# Patient Record
Sex: MALE | Race: WHITE
[De-identification: names, ages, dates, MRNs, and addresses within clinical notes are randomized per-mention and may not be internally consistent; named-entity substitution may affect disease eponyms.]

---

## 2019-07-23 ENCOUNTER — HOSPITAL ENCOUNTER (INPATIENT)
Dept: HOSPITAL 72 - EMR | Age: 64
LOS: 38 days | Discharge: NURSING, REHAB OR LONG-TERM CARE | DRG: 469 | End: 2019-08-30
Payer: MEDICAID

## 2019-07-23 VITALS — SYSTOLIC BLOOD PRESSURE: 88 MMHG | DIASTOLIC BLOOD PRESSURE: 52 MMHG

## 2019-07-23 VITALS — DIASTOLIC BLOOD PRESSURE: 55 MMHG | SYSTOLIC BLOOD PRESSURE: 100 MMHG

## 2019-07-23 VITALS — SYSTOLIC BLOOD PRESSURE: 104 MMHG | DIASTOLIC BLOOD PRESSURE: 50 MMHG

## 2019-07-23 VITALS — SYSTOLIC BLOOD PRESSURE: 99 MMHG | DIASTOLIC BLOOD PRESSURE: 54 MMHG

## 2019-07-23 VITALS — HEIGHT: 70 IN | BODY MASS INDEX: 26.08 KG/M2 | WEIGHT: 182.19 LBS

## 2019-07-23 VITALS — DIASTOLIC BLOOD PRESSURE: 52 MMHG | SYSTOLIC BLOOD PRESSURE: 93 MMHG

## 2019-07-23 VITALS — DIASTOLIC BLOOD PRESSURE: 57 MMHG | SYSTOLIC BLOOD PRESSURE: 102 MMHG

## 2019-07-23 DIAGNOSIS — N40.1: ICD-10-CM

## 2019-07-23 DIAGNOSIS — F10.20: ICD-10-CM

## 2019-07-23 DIAGNOSIS — N17.9: Primary | ICD-10-CM

## 2019-07-23 DIAGNOSIS — H54.62: ICD-10-CM

## 2019-07-23 DIAGNOSIS — R00.1: ICD-10-CM

## 2019-07-23 DIAGNOSIS — R57.1: ICD-10-CM

## 2019-07-23 DIAGNOSIS — A04.72: ICD-10-CM

## 2019-07-23 DIAGNOSIS — N18.9: ICD-10-CM

## 2019-07-23 DIAGNOSIS — N20.0: ICD-10-CM

## 2019-07-23 DIAGNOSIS — E44.0: ICD-10-CM

## 2019-07-23 DIAGNOSIS — Z22.322: ICD-10-CM

## 2019-07-23 DIAGNOSIS — D64.9: ICD-10-CM

## 2019-07-23 DIAGNOSIS — N13.6: ICD-10-CM

## 2019-07-23 DIAGNOSIS — F25.9: ICD-10-CM

## 2019-07-23 DIAGNOSIS — N32.3: ICD-10-CM

## 2019-07-23 DIAGNOSIS — R33.8: ICD-10-CM

## 2019-07-23 DIAGNOSIS — N21.0: ICD-10-CM

## 2019-07-23 DIAGNOSIS — F39: ICD-10-CM

## 2019-07-23 DIAGNOSIS — N31.9: ICD-10-CM

## 2019-07-23 DIAGNOSIS — E83.42: ICD-10-CM

## 2019-07-23 DIAGNOSIS — K70.30: ICD-10-CM

## 2019-07-23 DIAGNOSIS — E78.5: ICD-10-CM

## 2019-07-23 DIAGNOSIS — I13.10: ICD-10-CM

## 2019-07-23 DIAGNOSIS — G93.41: ICD-10-CM

## 2019-07-23 LAB
ADD MANUAL DIFF: NO
ALBUMIN SERPL-MCNC: 4 G/DL (ref 3.4–5)
ALBUMIN/GLOB SERPL: 1.1 {RATIO} (ref 1–2.7)
ALP SERPL-CCNC: 89 U/L (ref 46–116)
ALT SERPL-CCNC: 80 U/L (ref 12–78)
ANION GAP SERPL CALC-SCNC: 13 MMOL/L (ref 5–15)
APPEARANCE UR: (no result)
APTT BLD: 28 SEC (ref 23–33)
APTT PPP: YELLOW S
AST SERPL-CCNC: 60 U/L (ref 15–37)
BASOPHILS NFR BLD AUTO: 0.8 % (ref 0–2)
BILIRUB SERPL-MCNC: 0.4 MG/DL (ref 0.2–1)
BUN SERPL-MCNC: 53 MG/DL (ref 7–18)
CALCIUM SERPL-MCNC: 9.2 MG/DL (ref 8.5–10.1)
CHLORIDE SERPL-SCNC: 102 MMOL/L (ref 98–107)
CO2 SERPL-SCNC: 22 MMOL/L (ref 21–32)
CREAT SERPL-MCNC: 4.5 MG/DL (ref 0.55–1.3)
EOSINOPHIL NFR BLD AUTO: 3.8 % (ref 0–3)
ERYTHROCYTE [DISTWIDTH] IN BLOOD BY AUTOMATED COUNT: 14.1 % (ref 11.6–14.8)
GLOBULIN SER-MCNC: 3.8 G/DL
GLUCOSE UR STRIP-MCNC: NEGATIVE MG/DL
HCT VFR BLD CALC: 37.5 % (ref 42–52)
HGB BLD-MCNC: 12.3 G/DL (ref 14.2–18)
INR PPP: 1 (ref 0.9–1.1)
KETONES UR QL STRIP: NEGATIVE
LEUKOCYTE ESTERASE UR QL STRIP: (no result)
LYMPHOCYTES NFR BLD AUTO: 16 % (ref 20–45)
MCV RBC AUTO: 90 FL (ref 80–99)
MONOCYTES NFR BLD AUTO: 7.3 % (ref 1–10)
NEUTROPHILS NFR BLD AUTO: 72 % (ref 45–75)
NITRITE UR QL STRIP: NEGATIVE
PH UR STRIP: 5 [PH] (ref 4.5–8)
PLATELET # BLD: 165 K/UL (ref 150–450)
POTASSIUM SERPL-SCNC: 4.6 MMOL/L (ref 3.5–5.1)
PROT UR QL STRIP: (no result)
RBC # BLD AUTO: 4.16 M/UL (ref 4.7–6.1)
SODIUM SERPL-SCNC: 137 MMOL/L (ref 136–145)
SP GR UR STRIP: 1.02 (ref 1–1.03)
UROBILINOGEN UR-MCNC: NORMAL MG/DL (ref 0–1)
WBC # BLD AUTO: 9 K/UL (ref 4.8–10.8)

## 2019-07-23 PROCEDURE — 81001 URINALYSIS AUTO W/SCOPE: CPT

## 2019-07-23 PROCEDURE — 80048 BASIC METABOLIC PNL TOTAL CA: CPT

## 2019-07-23 PROCEDURE — 85610 PROTHROMBIN TIME: CPT

## 2019-07-23 PROCEDURE — 81003 URINALYSIS AUTO W/O SCOPE: CPT

## 2019-07-23 PROCEDURE — 85730 THROMBOPLASTIN TIME PARTIAL: CPT

## 2019-07-23 PROCEDURE — 96361 HYDRATE IV INFUSION ADD-ON: CPT

## 2019-07-23 PROCEDURE — 99285 EMERGENCY DEPT VISIT HI MDM: CPT

## 2019-07-23 PROCEDURE — 76770 US EXAM ABDO BACK WALL COMP: CPT

## 2019-07-23 PROCEDURE — 74176 CT ABD & PELVIS W/O CONTRAST: CPT

## 2019-07-23 PROCEDURE — 71045 X-RAY EXAM CHEST 1 VIEW: CPT

## 2019-07-23 PROCEDURE — 83880 ASSAY OF NATRIURETIC PEPTIDE: CPT

## 2019-07-23 PROCEDURE — 87324 CLOSTRIDIUM AG IA: CPT

## 2019-07-23 PROCEDURE — 87086 URINE CULTURE/COLONY COUNT: CPT

## 2019-07-23 PROCEDURE — 82550 ASSAY OF CK (CPK): CPT

## 2019-07-23 PROCEDURE — 84550 ASSAY OF BLOOD/URIC ACID: CPT

## 2019-07-23 PROCEDURE — 96367 TX/PROPH/DG ADDL SEQ IV INF: CPT

## 2019-07-23 PROCEDURE — 93005 ELECTROCARDIOGRAM TRACING: CPT

## 2019-07-23 PROCEDURE — 36415 COLL VENOUS BLD VENIPUNCTURE: CPT

## 2019-07-23 PROCEDURE — 96365 THER/PROPH/DIAG IV INF INIT: CPT

## 2019-07-23 PROCEDURE — 86900 BLOOD TYPING SEROLOGIC ABO: CPT

## 2019-07-23 PROCEDURE — 80061 LIPID PANEL: CPT

## 2019-07-23 PROCEDURE — 80053 COMPREHEN METABOLIC PANEL: CPT

## 2019-07-23 PROCEDURE — 86901 BLOOD TYPING SEROLOGIC RH(D): CPT

## 2019-07-23 PROCEDURE — 83735 ASSAY OF MAGNESIUM: CPT

## 2019-07-23 PROCEDURE — 84443 ASSAY THYROID STIM HORMONE: CPT

## 2019-07-23 PROCEDURE — 80329 ANALGESICS NON-OPIOID 1 OR 2: CPT

## 2019-07-23 PROCEDURE — 84484 ASSAY OF TROPONIN QUANT: CPT

## 2019-07-23 PROCEDURE — 86850 RBC ANTIBODY SCREEN: CPT

## 2019-07-23 PROCEDURE — 80307 DRUG TEST PRSMV CHEM ANLYZR: CPT

## 2019-07-23 PROCEDURE — 93306 TTE W/DOPPLER COMPLETE: CPT

## 2019-07-23 PROCEDURE — 85025 COMPLETE CBC W/AUTO DIFF WBC: CPT

## 2019-07-23 PROCEDURE — 87081 CULTURE SCREEN ONLY: CPT

## 2019-07-23 PROCEDURE — 85007 BL SMEAR W/DIFF WBC COUNT: CPT

## 2019-07-23 PROCEDURE — 84100 ASSAY OF PHOSPHORUS: CPT

## 2019-07-23 RX ADMIN — TRAMADOL HYDROCHLORIDE PRN MG: 50 TABLET, FILM COATED ORAL at 18:37

## 2019-07-23 RX ADMIN — TAMSULOSIN HYDROCHLORIDE SCH MG: 0.4 CAPSULE ORAL at 20:15

## 2019-07-23 RX ADMIN — LORAZEPAM PRN MG: 1 TABLET ORAL at 23:31

## 2019-07-23 NOTE — EMERGENCY ROOM REPORT
History of Present Illness


General


Chief Complaint:  General Complaint


Source:  Patient





Present Illness


HPI


Patient is a 64 year old male brought in from board and care for increased 

weakness and difficulty taking care of himself.  Patient states he can't 

remember why he's at hospital. He denies any current pain.  He was noted to be 

on multiple medications but doesn't know the names.


Allergies:  


Coded Allergies:  


     No Known Allergies (Unverified , 7/23/19)





Patient History


Past Medical History:  see triage record


Reviewed Nursing Documentation:  PMH: Agreed; PSxH: Agreed





Nursing Documentation-PMH


Past Medical History:  No History, Except For


Hx Cardiac Problems:  Yes


Hx Hypertension:  Yes


Hx Cancer:  No


Hx Gastrointestinal Problems:  No


Hx Neurological Problems:  No





Review of Systems


All Other Systems:  limited - by poor historian





Physical Exam





Vital Signs








  Date Time  Temp Pulse Resp B/P (MAP) Pulse Ox O2 Delivery O2 Flow Rate FiO2


 


7/23/19 11:23 97.5 56 18 87/54 (65) 92 Room Air  








General Appearance:  alert, Chronically Ill


Eyes:  bilateral eye other - left eye scarred cornea


ENT:  hearing grossly normal, normal voice


Neck:  full range of motion


Respiratory:  normal inspection, lungs clear, normal breath sounds


Cardiovascular #1:  normal inspection, regular rate, rhythm


Gastrointestinal:  normal inspection, non tender


Musculoskeletal:  decreased range of motion


Neurologic:  alert, responsive, other - oriented to name


Psychiatric:  depressed affect


Skin:  no rash





Medical Decision Making


Diagnostic Impression:  


 Primary Impression:  


 Generalized weakness


 Additional Impressions:  


 Renal insufficiency


 Bilateral renal stones


 Bladder calculi


 Urinary tract infection


ER Course


Patient present for increased generalized weakness.  Differential diagnosis 

include was not limited to electrolyte abnormality, sepsis, urinary tract 

infection, uremia among others.  Because of complexity of patient's case 

laboratory testing and imaging studies were ordered.  Patient was noted to have 

some increased generalized weakness as well as bizarre behavior.  He had some 

prior history of substance abuse.  CT imaging of the abdomen pelvis showed  

large renal stones bilaterally as well as large bladder stone.  Patient was 

noted to have some evidence of urinary infection as well as renal 

insufficiency.  It is unclear if this is acute or chronic.  Patient was 

discussed with Dr. Peter Bolaños for inpatient management due to panel 

physician.  Patient will be admitted for further evaluation and treatment.





Labs








Test


  7/23/19


12:20 7/23/19


14:16


 


White Blood Count


  9.0 K/UL


(4.8-10.8) 


 


 


Red Blood Count


  4.16 M/UL


(4.70-6.10) 


 


 


Hemoglobin


  12.3 G/DL


(14.2-18.0) 


 


 


Hematocrit


  37.5 %


(42.0-52.0) 


 


 


Mean Corpuscular Volume 90 FL (80-99)  


 


Mean Corpuscular Hemoglobin


  29.5 PG


(27.0-31.0) 


 


 


Mean Corpuscular Hemoglobin


Concent 32.7 G/DL


(32.0-36.0) 


 


 


Red Cell Distribution Width


  14.1 %


(11.6-14.8) 


 


 


Platelet Count


  165 K/UL


(150-450) 


 


 


Mean Platelet Volume


  6.7 FL


(6.5-10.1) 


 


 


Neutrophils (%) (Auto)


  72.0 %


(45.0-75.0) 


 


 


Lymphocytes (%) (Auto)


  16.0 %


(20.0-45.0) 


 


 


Monocytes (%) (Auto)


  7.3 %


(1.0-10.0) 


 


 


Eosinophils (%) (Auto)


  3.8 %


(0.0-3.0) 


 


 


Basophils (%) (Auto)


  0.8 %


(0.0-2.0) 


 


 


Prothrombin Time


  10.7 SEC


(9.30-11.50) 


 


 


Prothromb Time International


Ratio 1.0 (0.9-1.1) 


  


 


 


Activated Partial


Thromboplast Time 28 SEC (23-33) 


  


 


 


Sodium Level


  137 MMOL/L


(136-145) 


 


 


Potassium Level


  4.6 MMOL/L


(3.5-5.1) 


 


 


Chloride Level


  102 MMOL/L


() 


 


 


Carbon Dioxide Level


  22 MMOL/L


(21-32) 


 


 


Anion Gap


  13 mmol/L


(5-15) 


 


 


Blood Urea Nitrogen


  53 mg/dL


(7-18) 


 


 


Creatinine


  4.5 MG/DL


(0.55-1.30) 


 


 


Estimat Glomerular Filtration


Rate 13.2 mL/min


(>60) 


 


 


Glucose Level


  120 MG/DL


() 


 


 


Calcium Level


  9.2 MG/DL


(8.5-10.1) 


 


 


Total Bilirubin


  0.4 MG/DL


(0.2-1.0) 


 


 


Aspartate Amino Transf


(AST/SGOT) 60 U/L (15-37) 


  


 


 


Alanine Aminotransferase


(ALT/SGPT) 80 U/L (12-78) 


  


 


 


Alkaline Phosphatase


  89 U/L


() 


 


 


Troponin I


  0.000 ng/mL


(0.000-0.056) 


 


 


Pro-B-Type Natriuretic Peptide


  142 pg/mL


(0-125) 


 


 


Total Protein


  7.8 G/DL


(6.4-8.2) 


 


 


Albumin


  4.0 G/DL


(3.4-5.0) 


 


 


Globulin 3.8 g/dL  


 


Albumin/Globulin Ratio 1.1 (1.0-2.7)  


 


Thyroid Stimulating Hormone


(TSH) 3.037 uiU/mL


(0.358-3.740) 


 


 


Urine Color  Yellow 


 


Urine Appearance  Cloudy 


 


Urine pH  5 (4.5-8.0) 


 


Urine Specific Gravity


  


  1.020


(1.005-1.035)


 


Urine Protein  2+ (NEGATIVE) 


 


Urine Glucose (UA)


  


  Negative


(NEGATIVE)


 


Urine Ketones


  


  Negative


(NEGATIVE)


 


Urine Blood  5+ (NEGATIVE) 


 


Urine Nitrite


  


  Negative


(NEGATIVE)


 


Urine Bilirubin  1+ (NEGATIVE) 


 


Urine Ictotest


  


  Negative


(NEGATIVE)


 


Urine Urobilinogen


  


  Normal MG/DL


(0.0-1.0)


 


Urine Leukocyte Esterase  3+ (NEGATIVE) 


 


Urine RBC


  


  20-30 /HPF (0


- 0)


 


Urine WBC


  


  15-20 /HPF (0


- 0)


 


Urine Squamous Epithelial


Cells 


  Many /LPF


(NONE/OCC)


 


Urine Bacteria


  


  Few /HPF


(NONE)


 


Urine Opiates Screen


  


  Negative


(NEGATIVE)


 


Urine Barbiturates Screen


  


  Negative


(NEGATIVE)


 


Phencyclidine (PCP) Screen


  


  Negative


(NEGATIVE)


 


Urine Amphetamines Screen


  


  Negative


(NEGATIVE)


 


Urine Benzodiazepines Screen


  


  Negative


(NEGATIVE)


 


Urine Cocaine Screen


  


  Negative


(NEGATIVE)


 


Urine Marijuana (THC) Screen


  


  Negative


(NEGATIVE)








EKG Diagnostic Results


Rate:  bradycardiac


Rhythm:  NSR


ST Segments:  no acute changes





Last Vital Signs








  Date Time  Temp Pulse Resp B/P (MAP) Pulse Ox O2 Delivery O2 Flow Rate FiO2


 


7/23/19 17:11      Room Air  


 


7/23/19 15:33  60      


 


7/23/19 15:30 98.7  18 99/54 (69) 98   








Status:  improved


Disposition:  ADMITTED AS INPATIENT


Condition:  Serious


Referrals:  


NON PHYSICIAN (PCP)











Lalo Ventura MD Jul 23, 2019 21:46

## 2019-07-23 NOTE — CONSULTATION
DATE OF CONSULTATION:  07/23/2019

NEPHROLOGY CONSULTATION



CONSULTING PHYSICIAN:  Manny Eldridge M.D.



REFERRING PHYSICIAN:  Peter Bolaños M.D.



CHIEF COMPLAINT AND REASON FOR HOSPITALIZATION:  The patient is admitted

with elevated BUN and creatinine.



HISTORY OF PRESENT ILLNESS:  The patient is a 64-year-old man, who

apparently has had kidney stones before presents with elevated BUN and

creatinine.  CT showing 6.3 x 2 x 2.6 centimeter staghorn calculus filling

the inferior left renal pelvis, mild hydronephrosis, and a 9 x 9

millimeter calculus within the right renal pelvis with minimal right

hydronephrosis and a large bladder stone.  The patient has had some

history of kidney stones.  He has a very poor historian.  He is agitated

and swearing.  There is a history of prior alcohol and drug usage,

hypertension, hyperlipidemia.



ALLERGIES:  None known.



SURGERIES:  Hip fracture.



MEDICATIONS:  At the facility include doxazosin, tamsulosin, aspirin,

amlodipine, clonidine, lisinopril, atenolol, atorvastatin, vitamin B1, and

folic acid.



HABITS:  He is a cigarette smoker most of his adult life.  He has used

drugs and alcohol in the past.



SOCIAL HISTORY:  Lives in a residential facility.



SYSTEM REVIEW:

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

from trauma.  Hearing is good.

ENDOCRINE:  He is not aware of diabetes or thyroid disease.

PULMONARY:  He has been a smoker.  No current shortness of breath.  No

wheezing.

CARDIAC:  No angina or MI.

GASTROINTESTINAL:  No known obvious GI bleeding or ulcers.

GENITOURINARY:  He has had some difficulty voiding and suprapubic pain.

MUSCULOSKELETAL:  He has difficulty walking.  He has had prior trauma.

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

professional .



PHYSICAL EXAMINATION:

GENERAL:  The patient is alert, well-developed man, swearing and somewhat

agitated.

VITAL SIGNS:  Temperature 98.7, pulse 58, respirations 18, blood pressure

99/54, pulse ox 98.

HEAD, EYES, EARS, NOSE, AND THROAT:  There is a right lens opacity.  Ocular

motions intact in all directions.  Oral mucosa moist.

NECK:  No adenopathy.

LUNGS:  Clear.

HEART:  Regular rhythm.  No murmur.

ABDOMEN:  Soft.  No organomegaly.  There is some suprapubic tenderness.

EXTREMITIES:  No edema, cyanosis, or clubbing.

NEUROLOGIC:  He is alert and responsive.  Agitated.  Ocular motions intact

in all directions.  Smile symmetric.  Tongue is midline.  He moves all

extremities.  Gait is not tested.



LABORATORY DATA:  Pertinent labs show white count 9000, hemoglobin is 12.3.

Sodium 137, potassium 4.6, chloride 102, CO2 f 22, BUN 53, creatinine is

4.5, estimated GFR 13.2, calcium 9.2.  AST 60, ALT 80.  .  Tox

screen is negative.  Urinalysis shows 20 to 30 red cells and 15 to 20

white cells per high-power field.



IMPRESSION:

1. Kidney failure, acute versus chronic, likely due to staghorn calculi

and obstructive uropathy.  Rule out acute kidney injury versus chronic.

2. Bladder stone.

3. Mild hydronephrosis.

4. Agitation, etiology could be alcohol withdrawal or other.

5. History of hypertension.  Blood pressure low now.

6. History of hyperlipidemia.

7. History of taking medication for BPH.



PLAN:  The patient will be hydrated.  We will try to get him to cooperate

to Yang catheter.  He should have urologic evaluation.  We will try to

get old records.  Start him on empiric antibiotics pending culture

results.









  ______________________________________________

  Manny Eldridge M.D.





DR:  CLARITA

D:  07/23/2019 18:56

T:  07/23/2019 22:56

JOB#:  [id number]

CC:

## 2019-07-23 NOTE — DIAGNOSTIC IMAGING REPORT
Indication: Abdominal pain and lower back pain

 

Technique: Spiral acquisitions obtained through the abdomen and pelvis. No oral

contrast utilized, per emergency room physician request No IV contrast utilized, due

to renal insufficiency. Multiplanar reconstructions were generated. Total dose length

product 921.34 mGycm. CTDIvol(s) 17.49 mGy. Dose reduction achieved using automated

exposure control

 

 

Comparison: None

 

Findings: There is a very large staghorn calculus filling the inferior aspect of the

a large left extrarenal pelvis, with one branch extending into the left lower pole

infundibulum.. This is Y-shaped, measures approximately 6.3 x 2 x 2.6 cm. The renal

pelvis and collecting system are hydronephrotic, with mild calyceal hydronephrosis

present. No ureteral calculus demonstrated. There is mild ectasia of the distal left

ureter. 9 x 9 mm calculus within the right renal pelvis. There is only minimal if any

right hydronephrosis, although there is a slight degree of pelviectasis. The lack of

IV contrast limits assessment of the renal parenchyma. No gross renal parenchymal

mass or cyst demonstrated.

 

There is a very large bladder stone present. This measures 5.4 cm long axis dimension

by 3.8 x 4.2 cm orthogonal short axis dimensions. This is located dependently within

the bladder. There is mild posterior bladder wall thickening. There is a diverticulum

at the dome.

 

Lack of IV contrast limits assessment of the other solid organs. The liver

demonstrates surface nodularity, consistent with cirrhosis. It also demonstrates

relative atrophy of the right lobe and hypertrophy of the left lobe. No focal

abnormality demonstrated.

 

The gallbladder, bile ducts, pancreas are unremarkable. The spleen demonstrates

capsular calcifications. The adrenals are unremarkable. No pelvic mass or adenopathy.

 

The lack of enteric contrast limits assessment of the GI tract. The appendix is

normal. Fluid is seen within the colon. There is no evidence of diverticulosis or

diverticulitis. There is evidence of prior mesh hernia repair of the lower abdominal

wall, with anchor sutures in place. There is a tiny left inguinal hernia which

contains only fat. No small bowel distention. No free or loculated intraperitoneal

gas or fluid is evident. The distal esophagus, stomach, duodenum are unremarkable.

 

Included lung bases demonstrate posterior dependent atelectatic changes. The bones

demonstrate hardware in the left hip reducing an intertrochanteric fracture. The

fracture lines persist so the fracture may be subacute.

 

Impression: 6.3 x 2 x 2.6 cm staghorn calculus filling the inferior left renal

pelvis, which is a large extrarenal pelvis. There is mild hydronephrosis as a result

 

9 x 9 mm calculus within the right renal pelvis. Minimal if any right hydronephrosis

noted

 

Very large 5.4 x 3.8 x 4.2 cm bladder stone

 

Wall thickening in the posterior bladder. This could indicate acute or chronic

inflammation or indicate changes due to chronic bladder outlet obstruction

 

Superior dome bladder diverticulum

 

Hepatic surface nodularity, relative right lobe atrophy and left lobe hypertrophy,

consistent with cirrhosis

 

Nonspecific colonic fluid, could indicate diarrheal illness. Correlate with clinical

findings

 

Evidence of prior lower abdominal wall hernia mesh repair

 

Hardware reducing left hip intertrochanteric fracture. Fracture lines persist, so

suspect fracture and surgery or recent

 

Other findings as noted, including dependent posterior pulmonary atelectatic changes,

tiny fat-containing left inguinal hernia

 

Findings previously discussed by phone with Dr. Ventura in the emergency room

 

The CT scanner at [facility] is accredited by the [employer] College of

Radiology and the scans are performed using protocols designed to limit radiation

exposure to as low as reasonably achievable to attain images of sufficient resolution

adequate for diagnostic evaluation.

## 2019-07-24 VITALS — SYSTOLIC BLOOD PRESSURE: 108 MMHG | DIASTOLIC BLOOD PRESSURE: 62 MMHG

## 2019-07-24 VITALS — SYSTOLIC BLOOD PRESSURE: 99 MMHG | DIASTOLIC BLOOD PRESSURE: 52 MMHG

## 2019-07-24 VITALS — SYSTOLIC BLOOD PRESSURE: 107 MMHG | DIASTOLIC BLOOD PRESSURE: 62 MMHG

## 2019-07-24 VITALS — SYSTOLIC BLOOD PRESSURE: 101 MMHG | DIASTOLIC BLOOD PRESSURE: 60 MMHG

## 2019-07-24 VITALS — DIASTOLIC BLOOD PRESSURE: 64 MMHG | SYSTOLIC BLOOD PRESSURE: 105 MMHG

## 2019-07-24 VITALS — SYSTOLIC BLOOD PRESSURE: 106 MMHG | DIASTOLIC BLOOD PRESSURE: 61 MMHG

## 2019-07-24 LAB
ALBUMIN SERPL-MCNC: 3.2 G/DL (ref 3.4–5)
ALBUMIN/GLOB SERPL: 1 {RATIO} (ref 1–2.7)
ALP SERPL-CCNC: 81 U/L (ref 46–116)
ALT SERPL-CCNC: 66 U/L (ref 12–78)
ANION GAP SERPL CALC-SCNC: 11 MMOL/L (ref 5–15)
AST SERPL-CCNC: 51 U/L (ref 15–37)
BILIRUB SERPL-MCNC: 0.4 MG/DL (ref 0.2–1)
BUN SERPL-MCNC: 41 MG/DL (ref 7–18)
CALCIUM SERPL-MCNC: 8.1 MG/DL (ref 8.5–10.1)
CHLORIDE SERPL-SCNC: 108 MMOL/L (ref 98–107)
CK SERPL-CCNC: 60 U/L (ref 26–308)
CO2 SERPL-SCNC: 20 MMOL/L (ref 21–32)
CREAT SERPL-MCNC: 2.8 MG/DL (ref 0.55–1.3)
GLOBULIN SER-MCNC: 3.1 G/DL
PHOSPHATE SERPL-MCNC: 3.7 MG/DL (ref 2.5–4.9)
POTASSIUM SERPL-SCNC: 4.4 MMOL/L (ref 3.5–5.1)
SODIUM SERPL-SCNC: 139 MMOL/L (ref 136–145)

## 2019-07-24 RX ADMIN — ASPIRIN SCH MG: 81 TABLET, DELAYED RELEASE ORAL at 09:35

## 2019-07-24 RX ADMIN — SODIUM CHLORIDE SCH MLS/HR: 0.9 INJECTION INTRAVENOUS at 09:35

## 2019-07-24 RX ADMIN — TAMSULOSIN HYDROCHLORIDE SCH MG: 0.4 CAPSULE ORAL at 18:29

## 2019-07-24 RX ADMIN — TRAMADOL HYDROCHLORIDE PRN MG: 50 TABLET, FILM COATED ORAL at 12:10

## 2019-07-24 RX ADMIN — TAMSULOSIN HYDROCHLORIDE SCH MG: 0.4 CAPSULE ORAL at 09:35

## 2019-07-24 NOTE — PROGRESS NOTE
DATE:  07/24/2019

INTERNAL MEDICINE PROGRESS NOTE



SUBJECTIVE:  The patient is intermittently noncompliant with medications.

He has a Yang catheter in place draining clear urine.  Blood pressure

parameters remain low off antihypertensive, but slightly improved from

yesterday's admission vitals.



OBJECTIVE:

VITAL SIGNS:  Blood pressure 108/62, heart rate 53, respiratory rate 23.

LUNGS:  Clear.

CARDIAC:  Regular.  Normal S1, S2.  Monitored rhythm, sinus bradycardia.

ABDOMEN:  Soft.  No CVA tenderness.

EXTREMITIES:  No edema.



LABORATORY DATA:  Sodium 139, potassium 4.4, bicarbonate 20, BUN 41,

creatinine 2.8.  Uric acid 7.6.  Albumin 3.2.



IMPRESSION:

1. Acute renal failure, improved with hydration.

2. Obstructive uropathy with staghorn vesicular calculus and right renal

calculus.

3. Hyperuricemia.

4. Metabolic encephalopathy.

5. Probable urinary tract infection.

6. Sinus bradycardia.

7. Hypovolemic shock.



PLAN:

1. Continue to hold antihypertensives.

2. Continue cardiac monitoring.

3. Continue empiric antimicrobials.

4. Continue intravenous fluid volume resuscitation.

5. Maintain tamsulosin.

6. Strain urine with Yang catheter in place.

7. Urology followup.









  ______________________________________________

  BRIGIDA Godfrey

D:  07/24/2019 22:58

T:  07/24/2019 23:09

JOB#:  [id number]

CC:

## 2019-07-24 NOTE — CONSULTATION
DATE OF CONSULTATION:  07/23/2019

CONSULTING PHYSICIAN:  Israel Pink M.D.



REFERRING PHYSICIAN:  Peter Bolaños M.D.



REASON FOR CONSULTATION:  Multiple urologic issues.



HISTORY OF PRESENT ILLNESS:  This is a 64-year-old male who is an ER panel

patient.  He came to the emergency room because of vague discomfort.  He

was noted to have multiple stones in the kidney and bladder and renal

insufficiency all of which appeared to be chronic.  He is a very poor

historian.



PAST MEDICAL HISTORY:  As above.



MEDICATIONS:  Current medication list reviewed.



ALLERGIES:  No allergies.



PHYSICAL EXAMINATION:

GENERAL:  The patient is in no acute distress, no pain.

VITAL SIGNS:  Temperature is 98.7, blood pressure 99/54.  Yang catheter

was placed by the nursing staff.  Urine is grossly yellow.  There is no

CVA tenderness.



LABORATORY DATA:  UA shows 20 to 30 rbc's, 15 to 20 wbc's.  Creatinine is

4.5, baseline is unknown.



DIAGNOSTIC IMAGING STUDIES:  The patient had a CT scan of the abdomen and

pelvis, this showed evidence of a large staghorn calculus of left renal

pelvis, 6 cm large extrarenal pelvis.  There was mild hydronephrosis

reported.  There was 9 mm stone in the right renal pelvis, no

hydronephrosis, 5 cm bladder stone, thickening of the posterior bladder

wall and bladder diverticulum.  There was evidence of bladder cirrhosis.



IMPRESSION:

1. Staghorn renal calculus.

2. Bladder calculus.

3. Mild hydronephrosis.

4. Chronic kidney disease.

5. Possible acute kidney injury.

6. Hematuria.

7. Pyuria.

8. Urinary retention.

9. Neurogenic bladder.

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



PLAN AND DISCUSSION:  The patient again does have a large staghorn calculus

and large bladder calculus.  These are both chronic findings as well as

hydronephrosis which is mild.  He has chronic renal insufficiency.  His

kidney function will be monitored.  Yang catheter is currently

indwelling.  Flomax has been added as well as antibiotics and he will be

monitored clinically and he can have treatment of his stones electively as

an outpatient with his urologist as planned.  I did also speak with Dr. Ibarra who is the panel urologist for the ER to be aware of the patient

also and we will go from there.



Thank you for this consultation.









  ______________________________________________

  Israel Pink M.D.





DR:  Nick

D:  07/23/2019 21:59

T:  07/24/2019 00:15

JOB#:  [id number]

CC:

## 2019-07-24 NOTE — NEPHROLOGY PROGRESS NOTE
Assessment/Plan


Problem List:  


(1) Bladder calculi


(2) Renal insufficiency


(3) Bilateral renal stones


(4) KENYETTA (acute kidney injury)


(5) Hydronephrosis


(6) Staghorn calculus


Plan


creatinine lower, still agitated and pulled iv, consider psych consult, will 

need further  procedures in future





Subjective


Constitutional:  Reports: other


HEENT:  Reports: no symptoms


Genitourinary:  Reports: pain


Neurologic/Psychiatric:  Reports: no symptoms


Subjective


pain with pierre , no distress-





Objective


Objective





Last 24 Hour Vital Signs








  Date Time  Temp Pulse Resp B/P (MAP) Pulse Ox O2 Delivery O2 Flow Rate FiO2


 


7/24/19 16:00  55      


 


7/24/19 16:00 97.8 53 23 108/62 (77) 98   


 


7/24/19 12:00  54      


 


7/24/19 12:00 98.0 60 23 105/64 (78) 93   


 


7/24/19 09:00      Room Air  


 


7/24/19 08:00  61      


 


7/24/19 08:00 99.0 59 23 99/52 (68) 94   


 


7/24/19 04:00 98.3 58 19 101/60 (74) 92   


 


7/24/19 04:00  61      


 


7/24/19 00:00 97.3 62 18 107/62 (77)    


 


7/24/19 00:00  56      


 


7/23/19 21:00      Room Air  


 


7/23/19 20:00 97.1 59 19 102/57 (72) 95   


 


7/23/19 20:00  64      

















Intake and Output  


 


 7/23/19 7/24/19





 19:00 07:00


 


Intake Total 320 ml 


 


Output Total  700 ml


 


Balance 320 ml -700 ml


 


  


 


Intake Oral 320 ml 


 


Output Urine Total  700 ml


 


# Voids 1 








Laboratory Tests


7/23/19 21:20: Serum Alcohol < 3


7/24/19 06:00: 


Sodium Level 139, Potassium Level 4.4, Chloride Level 108H, Carbon Dioxide 

Level 20L, Anion Gap 11, Blood Urea Nitrogen 41H, Creatinine 2.8H, Estimat 

Glomerular Filtration Rate 22.9, Glucose Level 102, Uric Acid 7.6H, Calcium 

Level 8.1L, Phosphorus Level 3.7, Magnesium Level 1.9, Total Bilirubin 0.4, 

Aspartate Amino Transf (AST/SGOT) 51H, Alanine Aminotransferase (ALT/SGPT) 66, 

Alkaline Phosphatase 81, Total Creatine Kinase 60, Total Protein 6.3L, Albumin 

3.2L, Globulin 3.1, Albumin/Globulin Ratio 1.0


Height (Feet):  5


Height (Inches):  10.00


Weight (Pounds):  181


General Appearance:  no apparent distress, alert, agitated


EENT:  normal ENT inspection


Neck:  normal alignment


Cardiovascular:  normal rate, regular rhythm


Respiratory/Chest:  lungs clear, normal breath sounds


Abdomen:  non tender, soft


Extremities:  other - no edema


Neurologic:  CNs II-XII grossly normal











Manny Eldridge MD Jul 24, 2019 18:03

## 2019-07-24 NOTE — UROLOGY PROGRESS NOTE
Assessment/Plan


Assessment/Plan:


1. Staghorn renal calculus.


2. Bladder calculus.


3. Mild hydronephrosis, likely chronic.


4. Chronic kidney disease.


5. Possible acute kidney injury.


6. Hematuria.


7. Pyuria.


8. Urinary retention.


9. Neurogenic bladder.


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





monitor clinically


pierre


monitor renal fxn


abx as ordered


f/u on cx's


flomax





Subjective


Allergies:  


Coded Allergies:  


     No Known Allergies (Unverified , 7/23/19)


Subjective


all noted, no pain





Objective





Last 24 Hour Vital Signs








  Date Time  Temp Pulse Resp B/P (MAP) Pulse Ox O2 Delivery O2 Flow Rate FiO2


 


7/24/19 09:00      Room Air  


 


7/24/19 08:00  61      


 


7/24/19 08:00 99.0 59 23 99/52 (68) 94   


 


7/24/19 04:00 98.3 58 19 101/60 (74) 92   


 


7/24/19 04:00  61      


 


7/24/19 00:00 97.3 62 18 107/62 (77)    


 


7/24/19 00:00  56      


 


7/23/19 21:00      Room Air  


 


7/23/19 20:00 97.1 59 19 102/57 (72) 95   


 


7/23/19 20:00  64      


 


7/23/19 17:11      Room Air  


 


7/23/19 15:33  60      


 


7/23/19 15:30 98.7 58 18 99/54 (69) 98   


 


7/23/19 15:03 97.9 62 15 100/55 97 Room Air  


 


7/23/19 14:57  62 15 100/55 97 Room Air  


 


7/23/19 14:57 97.9       


 


7/23/19 14:18  61 15 93/52 97 Room Air  

















Intake and Output  


 


 7/23/19 7/24/19





 19:00 07:00


 


Intake Total 320 ml 


 


Output Total  700 ml


 


Balance 320 ml -700 ml


 


  


 


Intake Oral 320 ml 


 


Output Urine Total  700 ml


 


# Voids 1 











Microbiology








 Date/Time


Source Procedure


Growth Status


 


 


 7/23/19 14:16


Urine,Clean Catch Urine Culture - Preliminary


NO GROWTH Resulted








Current Medications








 Medications


  (Trade)  Dose


 Ordered  Sig/Lul


 Route


 PRN Reason  Start Time


 Stop Time Status Last Admin


Dose Admin


 


 Acetaminophen/


 Hydrocodone Bitart


  (Norco 5/325)  1 tab  Q4H  PRN


 ORAL


 Severe Pain (Pain Scale 7-10)  7/23/19 18:30


 7/30/19 18:29   


 


 


 Aspirin


  (Ecotrin)  81 mg  DAILY


 ORAL


   7/24/19 09:00


 8/23/19 08:59  7/24/19 09:35


 


 


 Ceftriaxone


 Sodium 1 gm/


 Dextrose  55 ml @ 


 110 mls/hr  DAILY


 IVPB


   7/24/19 09:00


 7/25/19 12:00  7/24/19 09:35


 


 


 Folic Acid


  (Folate)  1 mg  DAILY


 ORAL


   7/24/19 09:00


 8/23/19 08:59  7/24/19 09:35


 


 


 Lorazepam


  (Ativan)  1 mg  Q6H  PRN


 ORAL


 For Anxiety  7/23/19 19:00


 7/30/19 18:59  7/23/19 23:31


 


 


 Multivitamins


  (Multivitamins)  1 tab  DAILY


 ORAL


   7/24/19 09:00


 8/23/19 08:59  7/24/19 09:35


 


 


 Sodium Chloride  1,000 ml @ 


 125 mls/hr  Q8H


 IV


   7/23/19 17:34


 8/22/19 17:33  7/24/19 09:35


 


 


 Tamsulosin HCl


  (Flomax)  0.4 mg  BID


 ORAL


   7/23/19 19:01


 8/22/19 19:00  7/24/19 09:35


 


 


 Tramadol HCl


  (Ultram)  50 mg  Q6H  PRN


 ORAL


 Moderate Pain (Pain Scale 4-6)  7/23/19 18:30


 7/30/19 18:29  7/24/19 12:10


 





Laboratory Tests


7/23/19 14:16: 


Urine Color Yellow, Urine Appearance Cloudy, Urine pH 5, Urine Specific Gravity 

1.020, Urine Protein 2+H, Urine Glucose (UA) Negative, Urine Ketones Negative, 

Urine Blood 5+H, Urine Nitrite Negative, Urine Bilirubin 1+H, Urine Ictotest 

Negative, Urine Urobilinogen Normal, Urine Leukocyte Esterase 3+H, Urine RBC 20-

30H, Urine WBC 15-20H, Urine Squamous Epithelial Cells ManyH, Urine Bacteria Few

, Urine Opiates Screen Negative, Urine Barbiturates Screen Negative, 

Phencyclidine (PCP) Screen Negative, Urine Amphetamines Screen Negative, Urine 

Benzodiazepines Screen Negative, Urine Cocaine Screen Negative, Urine Marijuana 

(THC) Screen Negative


7/23/19 21:20: Serum Alcohol < 3


7/24/19 06:00: 


Sodium Level 139, Potassium Level 4.4, Chloride Level 108H, Carbon Dioxide 

Level 20L, Anion Gap 11, Blood Urea Nitrogen 41H, Creatinine 2.8H, Estimat 

Glomerular Filtration Rate 22.9, Glucose Level 102, Uric Acid 7.6H, Calcium 

Level 8.1L, Phosphorus Level 3.7, Magnesium Level 1.9, Total Bilirubin 0.4, 

Aspartate Amino Transf (AST/SGOT) 51H, Alanine Aminotransferase (ALT/SGPT) 66, 

Alkaline Phosphatase 81, Total Creatine Kinase 60, Total Protein 6.3L, Albumin 

3.2L, Globulin 3.1, Albumin/Globulin Ratio 1.0


Height (Feet):  5


Height (Inches):  10.00


Weight (Pounds):  181


Objective


 exam stable, urine grossly clear











Israel Pink MD Jul 24, 2019 13:53

## 2019-07-24 NOTE — HISTORY AND PHYSICAL REPORT
DATE OF ADMISSION:  07/23/2019

REASON FOR ADMISSION:  Renal failure in the setting of obstructive

uropathy.



HISTORY:  This 64-year-old male, who resides in an assisted living facility

has a known history of kidney stones.  He presented with increased

weakness and malaise and inability for self-care.  He is a very poor

historian and was unaware of any details.  His workup in the emergency

room was undertaken and notable for renal impairment.  In addition, he was

hypotensive and bradycardic.



PAST MEDICAL HISTORY:  Includes hypertension, prostatic hypertrophy,

hyperlipidemia, left hip fracture, status post ORIF.



ALLERGIES:  None known.



MEDICATIONS:  Prior to admission, reviewed and reconciled.  However

compliance is unclear.



FAMILY HISTORY:  Noncontributory.



SOCIAL HISTORY:  He denies smoking, alcohol, and substance abuse.



REVIEW OF SYSTEMS:  Cannot be reliably obtained from the patient at this

time.



PHYSICAL EXAMINATION:

VITAL SIGNS:  Blood pressure 87/54, pulse 56, respirations 18, afebrile.

HEENT:  Conjunctivae pink.  Sclerae are anicteric.  Oropharynx clear.

Mucous membranes _______.

NECK:  Jugular venous pressure normal.

LUNGS:  Clear.

CARDIAC:  Regular rhythm, rate.  Normal S1, S2 with no murmur.

ABDOMEN:  Soft.  No guarding or rebound.  No CVA tenderness.

EXTREMITIES:  Without edema.



LABORATORY DATA:  BUN 53, creatinine 4.5, sodium 137, potassium 4.6,

bicarbonate 22.  Urinalysis with 20 to 30 red cells and 15 to 20 white

cells, moderate bacteria.  EKG with sinus rhythm and nonspecific ST

changes.  White count 9, hemoglobin 12.3.  CAT scan of the abdomen and

pelvis revealed a staghorn calculus in the left with an extrarenal pelvis

and mild hydronephrosis, calculus within the right renal pelvis, bladder

stone, diverticulum, cirrhosis, prior hernia repair.



IMPRESSION:

1. Renal failure, acute on chronic.

2. Obstructive uropathy.

3. Staghorn calculus.

4. Hypovolemia with shock.

5. History of hypertension.

6. History of hyperlipidemia.

7. Acute encephalopathy, likely metabolic.



PLAN:

1. Panculture.

2. Empiric antimicrobials.

3. Yagn catheter placement.

4. Hydration with saline.

5. Close monitoring of renal parameters.

6. Strain urine.

7. Urology and Renal consultations.

8. Hold antihypertensives.

9. Check uric acid levels and lipid panel as well as thyroid function.

10. Cardiac monitoring.

11. May need pressors for further drop in blood pressure parameters.









  ______________________________________________

  Peter Bolaños M.D.





DR:  CHEVY

D:  07/24/2019 22:53

T:  07/24/2019 23:05

JOB#:  [id number]

CC:

## 2019-07-24 NOTE — CARDIOLOGY REPORT
--------------- APPROVED REPORT --------------





EKG Measurement

Heart [id number]

GA [id number]

[id number]

[id number]

[id number]





Sinus bradycardia

Prolonged QT

Abnormal ECG

## 2019-07-25 VITALS — SYSTOLIC BLOOD PRESSURE: 111 MMHG | DIASTOLIC BLOOD PRESSURE: 56 MMHG

## 2019-07-25 VITALS — SYSTOLIC BLOOD PRESSURE: 154 MMHG | DIASTOLIC BLOOD PRESSURE: 81 MMHG

## 2019-07-25 VITALS — SYSTOLIC BLOOD PRESSURE: 127 MMHG | DIASTOLIC BLOOD PRESSURE: 66 MMHG

## 2019-07-25 VITALS — DIASTOLIC BLOOD PRESSURE: 73 MMHG | SYSTOLIC BLOOD PRESSURE: 148 MMHG

## 2019-07-25 VITALS — DIASTOLIC BLOOD PRESSURE: 84 MMHG | SYSTOLIC BLOOD PRESSURE: 149 MMHG

## 2019-07-25 VITALS — DIASTOLIC BLOOD PRESSURE: 75 MMHG | SYSTOLIC BLOOD PRESSURE: 144 MMHG

## 2019-07-25 LAB
ADD MANUAL DIFF: NO
ANION GAP SERPL CALC-SCNC: 8 MMOL/L (ref 5–15)
BASOPHILS NFR BLD AUTO: 1 % (ref 0–2)
BUN SERPL-MCNC: 22 MG/DL (ref 7–18)
CALCIUM SERPL-MCNC: 8.2 MG/DL (ref 8.5–10.1)
CHLORIDE SERPL-SCNC: 108 MMOL/L (ref 98–107)
CHOLEST SERPL-MCNC: 88 MG/DL (ref ?–200)
CO2 SERPL-SCNC: 20 MMOL/L (ref 21–32)
CREAT SERPL-MCNC: 1.8 MG/DL (ref 0.55–1.3)
EOSINOPHIL NFR BLD AUTO: 6.1 % (ref 0–3)
ERYTHROCYTE [DISTWIDTH] IN BLOOD BY AUTOMATED COUNT: 13.9 % (ref 11.6–14.8)
HCT VFR BLD CALC: 34 % (ref 42–52)
HDLC SERPL-MCNC: 36 MG/DL (ref 40–60)
HGB BLD-MCNC: 11.2 G/DL (ref 14.2–18)
LYMPHOCYTES NFR BLD AUTO: 17.6 % (ref 20–45)
MCV RBC AUTO: 91 FL (ref 80–99)
MONOCYTES NFR BLD AUTO: 8.4 % (ref 1–10)
NEUTROPHILS NFR BLD AUTO: 66.9 % (ref 45–75)
PLATELET # BLD: 106 K/UL (ref 150–450)
POTASSIUM SERPL-SCNC: 4.3 MMOL/L (ref 3.5–5.1)
RBC # BLD AUTO: 3.74 M/UL (ref 4.7–6.1)
SODIUM SERPL-SCNC: 136 MMOL/L (ref 136–145)
TRIGL SERPL-MCNC: 60 MG/DL (ref 30–150)
WBC # BLD AUTO: 7.6 K/UL (ref 4.8–10.8)

## 2019-07-25 RX ADMIN — LORAZEPAM PRN MG: 1 TABLET ORAL at 04:58

## 2019-07-25 RX ADMIN — ASPIRIN SCH MG: 81 TABLET, DELAYED RELEASE ORAL at 08:13

## 2019-07-25 RX ADMIN — TAMSULOSIN HYDROCHLORIDE SCH MG: 0.4 CAPSULE ORAL at 18:42

## 2019-07-25 RX ADMIN — SODIUM CHLORIDE SCH MLS/HR: 0.9 INJECTION INTRAVENOUS at 08:14

## 2019-07-25 RX ADMIN — TAMSULOSIN HYDROCHLORIDE SCH MG: 0.4 CAPSULE ORAL at 08:13

## 2019-07-25 NOTE — CONSULTATION
DATE OF CONSULTATION:  07/25/2019

HISTORY OF PRESENT ILLNESS:  This is a 64-year-old male with a history of

multiple medical comorbidities who was admitted to the hospital due to

renal failure.  The patient has history of hypertension, urinary tract

infection, generalized weakness, renal insufficiency, hydrocephalus, acute

kidney injury.  The patient is severely agitated and confused.  He was in

restraints, attempting to come out of bed, memory impairment, waxing and

waning consciousness, unable to provide any history.



PAST PSYCHIATRIC HISTORY:  The patient is a poor historian and stated that

he has no history of psych history.



PAST MEDICAL HISTORY:

1. Hypertension.

2. Prostatic hypertrophy.

3. Hyperlipidemia.

4. Left hip fracture.

5. Status post open reduction and internal fixation.



ALLERGIES:  No known drug allergies.



SUBSTANCE ABUSE HISTORY:  No known history of illicit drug use or

alcohol.



MENTAL STATUS EXAMINATION:  The patient alert and oriented x self,

confused, disoriented.  Mood is agitated.  Affect is constricted.

Congruent with mood.  Thought process is disorganized.  Thought content,

no suicidal or homicidal ideations.  Memory is impaired.  Insight and

judgment non-existent.



ASSESSMENT:

AXIS I:  Acute metabolic encephalopathy.

AXIS II:  Deferred.

AXIS III:  As above.

AXIS IV:  Low.

AXIS V:  ______ .



PLAN:

1. We will increase Seroquel to 25 mg t.i.d.

2. Continue the restraints.

3. Continue Ativan as needed.

4. The patient lacks capacity to make any decision.

5. We will continue to follow and readjust the medication.









  ______________________________________________

  Morris Meyers M.D.





DR:  Oscar

D:  07/25/2019 17:37

T:  07/25/2019 22:43

JOB#:  [id number]

CC:

## 2019-07-25 NOTE — NEPHROLOGY PROGRESS NOTE
Assessment/Plan


Problem List:  


(1) Bladder calculi


(2) Renal insufficiency


(3) Bilateral renal stones


(4) KENYETTA (acute kidney injury)


(5) Hydronephrosis


(6) Staghorn calculus


Plan


creatinine lower, 1.8,  consider psych consult, will need further  procedures 

in future





Subjective


Constitutional:  Reports: weakness


HEENT:  Reports: no symptoms


Genitourinary:  Reports: other - pierre bothersome


Neurologic/Psychiatric:  Reports: no symptoms


Subjective


pain with pierre , no distress-





Objective


Objective





Last 24 Hour Vital Signs








  Date Time  Temp Pulse Resp B/P (MAP) Pulse Ox O2 Delivery O2 Flow Rate FiO2


 


7/25/19 08:00 97.2 61 14 148/73 (98) 94   


 


7/25/19 04:00 97.5 63 20 154/81 (105) 97   


 


7/25/19 03:26  53      


 


7/25/19 00:00 97.7 52 12 127/66 (86) 96   


 


7/24/19 23:30  52      


 


7/24/19 21:00      Room Air  


 


7/24/19 20:08  48      


 


7/24/19 20:00 98.3 49 18 106/61 (76) 96   


 


7/24/19 16:00  55      


 


7/24/19 16:00 97.8 53 23 108/62 (77) 98   

















Intake and Output  


 


 7/24/19 7/25/19





 19:00 07:00


 


Intake Total  125 ml


 


Output Total 1400 ml 1350 ml


 


Balance -1400 ml -1225 ml


 


  


 


IV Total  125 ml


 


Output Urine Total 1400 ml 1350 ml


 


# Bowel Movements  1








Laboratory Tests


7/25/19 05:55: 


White Blood Count 7.6, Red Blood Count 3.74L, Hemoglobin 11.2L, Hematocrit 34.0L

, Mean Corpuscular Volume 91, Mean Corpuscular Hemoglobin 29.9, Mean 

Corpuscular Hemoglobin Concent 32.9, Red Cell Distribution Width 13.9, Platelet 

Count 106L, Mean Platelet Volume 6.4L, Neutrophils (%) (Auto) 66.9, Lymphocytes 

(%) (Auto) 17.6L, Monocytes (%) (Auto) 8.4, Eosinophils (%) (Auto) 6.1H, 

Basophils (%) (Auto) 1.0, Sodium Level 136, Potassium Level 4.3, Chloride Level 

108H, Carbon Dioxide Level 20L, Anion Gap 8, Blood Urea Nitrogen 22H, 

Creatinine 1.8H, Estimat Glomerular Filtration Rate 38.1, Glucose Level 95, 

Calcium Level 8.2L, Triglycerides Level 60, Cholesterol Level 88, LDL 

Cholesterol 44, HDL Cholesterol 36L, Cholesterol/HDL Ratio 2.4L


Height (Feet):  5


Height (Inches):  10.00


Weight (Pounds):  181


General Appearance:  no apparent distress, alert


EENT:  normal ENT inspection


Neck:  normal alignment


Cardiovascular:  normal rate, regular rhythm


Respiratory/Chest:  lungs clear, normal breath sounds


Abdomen:  non tender, soft


Extremities:  other - no edema


Neurologic:  CNs II-XII grossly normal











Manny Eldridge MD Jul 25, 2019 13:31

## 2019-07-25 NOTE — UROLOGY PROGRESS NOTE
Assessment/Plan


Assessment/Plan:


1. Staghorn renal calculus.


2. Bladder calculus.


3. Mild hydronephrosis, likely chronic.


4. Chronic kidney disease.


5. Acute kidney injury, improved.


6. Hematuria.


7. Pyuria.


8. Urinary retention.


9. Neurogenic bladder.


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





monitor clinically


pierre


monitor renal fxn


abx as ordered


f/u on cx's


flomax





Subjective


Allergies:  


Coded Allergies:  


     No Known Allergies (Unverified , 7/23/19)


Subjective


all noted, agitated yest, restraints placed





Objective





Last 24 Hour Vital Signs








  Date Time  Temp Pulse Resp B/P (MAP) Pulse Ox O2 Delivery O2 Flow Rate FiO2


 


7/25/19 04:00 97.5 63 20 154/81 (105) 97   


 


7/25/19 03:26  53      


 


7/25/19 00:00 97.7 52 12 127/66 (86) 96   


 


7/24/19 23:30  52      


 


7/24/19 21:00      Room Air  


 


7/24/19 20:08  48      


 


7/24/19 20:00 98.3 49 18 106/61 (76) 96   


 


7/24/19 16:00  55      


 


7/24/19 16:00 97.8 53 23 108/62 (77) 98   


 


7/24/19 12:00  54      


 


7/24/19 12:00 98.0 60 23 105/64 (78) 93   


 


7/24/19 09:00      Room Air  

















Intake and Output  


 


 7/24/19 7/25/19





 19:00 07:00


 


Intake Total  125 ml


 


Output Total 1400 ml 1350 ml


 


Balance -1400 ml -1225 ml


 


  


 


IV Total  125 ml


 


Output Urine Total 1400 ml 1350 ml


 


# Bowel Movements  1











Microbiology








 Date/Time


Source Procedure


Growth Status


 


 


 7/23/19 14:16


Urine,Clean Catch Urine Culture - Preliminary


NO GROWTH Resulted


 


 7/23/19 14:50


Rectum VRE Culture - Final


NO VANCOMYCIN RESISTANT ENTEROCOCCUS ... Complete








Current Medications








 Medications


  (Trade)  Dose


 Ordered  Sig/Lul


 Route


 PRN Reason  Start Time


 Stop Time Status Last Admin


Dose Admin


 


 Acetaminophen/


 Hydrocodone Bitart


  (Norco 5/325)  1 tab  Q4H  PRN


 ORAL


 Severe Pain (Pain Scale 7-10)  7/23/19 18:30


 7/30/19 18:29  7/24/19 16:02


 


 


 Aspirin


  (Ecotrin)  81 mg  DAILY


 ORAL


   7/24/19 09:00


 8/23/19 08:59  7/24/19 09:35


 


 


 Ceftriaxone


 Sodium 1 gm/


 Dextrose  55 ml @ 


 110 mls/hr  DAILY


 IVPB


   7/24/19 09:00


 7/25/19 12:00  7/24/19 09:35


 


 


 Folic Acid


  (Folate)  1 mg  DAILY


 ORAL


   7/24/19 09:00


 8/23/19 08:59  7/24/19 09:35


 


 


 Lorazepam


  (Ativan)  1 mg  Q6H  PRN


 ORAL


 For Anxiety  7/23/19 19:00


 7/30/19 18:59  7/25/19 04:58


 


 


 Multivitamins


  (Multivitamins)  1 tab  DAILY


 ORAL


   7/24/19 09:00


 8/23/19 08:59  7/24/19 09:35


 


 


 Quetiapine


 Fumarate


  (SEROquel)  12.5 mg  TID


 ORAL


   7/24/19 18:00


 8/23/19 17:59  7/24/19 18:29


 


 


 Sodium Chloride  1,000 ml @ 


 125 mls/hr  Q8H


 IV


   7/23/19 17:34


 8/22/19 17:33  7/25/19 06:00


 


 


 Tamsulosin HCl


  (Flomax)  0.4 mg  BID


 ORAL


   7/23/19 19:01


 8/22/19 19:00  7/24/19 18:29


 


 


 Tramadol HCl


  (Ultram)  50 mg  Q6H  PRN


 ORAL


 Moderate Pain (Pain Scale 4-6)  7/23/19 18:30


 7/30/19 18:29  7/24/19 12:10


 





Laboratory Tests


7/25/19 05:55: 


White Blood Count 7.6, Red Blood Count 3.74L, Hemoglobin 11.2L, Hematocrit 34.0L

, Mean Corpuscular Volume 91, Mean Corpuscular Hemoglobin 29.9, Mean 

Corpuscular Hemoglobin Concent 32.9, Red Cell Distribution Width 13.9, Platelet 

Count 106L, Mean Platelet Volume 6.4L, Neutrophils (%) (Auto) 66.9, Lymphocytes 

(%) (Auto) 17.6L, Monocytes (%) (Auto) 8.4, Eosinophils (%) (Auto) 6.1H, 

Basophils (%) (Auto) 1.0, Sodium Level 136, Potassium Level 4.3, Chloride Level 

108H, Carbon Dioxide Level 20L, Anion Gap 8, Blood Urea Nitrogen 22H, 

Creatinine 1.8H, Estimat Glomerular Filtration Rate 38.1, Glucose Level 95, 

Calcium Level 8.2L, Triglycerides Level 60, Cholesterol Level 88, LDL 

Cholesterol 44, HDL Cholesterol 36L, Cholesterol/HDL Ratio 2.4L


Height (Feet):  5


Height (Inches):  10.00


Weight (Pounds):  181


Objective


 exam stable, urine grossly clear











Israel Pink MD Jul 25, 2019 08:09

## 2019-07-26 VITALS — SYSTOLIC BLOOD PRESSURE: 136 MMHG | DIASTOLIC BLOOD PRESSURE: 84 MMHG

## 2019-07-26 VITALS — SYSTOLIC BLOOD PRESSURE: 143 MMHG | DIASTOLIC BLOOD PRESSURE: 81 MMHG

## 2019-07-26 VITALS — SYSTOLIC BLOOD PRESSURE: 146 MMHG | DIASTOLIC BLOOD PRESSURE: 78 MMHG

## 2019-07-26 VITALS — DIASTOLIC BLOOD PRESSURE: 69 MMHG | SYSTOLIC BLOOD PRESSURE: 143 MMHG

## 2019-07-26 VITALS — SYSTOLIC BLOOD PRESSURE: 150 MMHG | DIASTOLIC BLOOD PRESSURE: 76 MMHG

## 2019-07-26 VITALS — SYSTOLIC BLOOD PRESSURE: 141 MMHG | DIASTOLIC BLOOD PRESSURE: 87 MMHG

## 2019-07-26 LAB
ALBUMIN SERPL-MCNC: 2.9 G/DL (ref 3.4–5)
ALBUMIN/GLOB SERPL: 0.7 {RATIO} (ref 1–2.7)
ALP SERPL-CCNC: 86 U/L (ref 46–116)
ALT SERPL-CCNC: 51 U/L (ref 12–78)
ANION GAP SERPL CALC-SCNC: 12 MMOL/L (ref 5–15)
AST SERPL-CCNC: 43 U/L (ref 15–37)
BILIRUB SERPL-MCNC: 0.6 MG/DL (ref 0.2–1)
BUN SERPL-MCNC: 20 MG/DL (ref 7–18)
CALCIUM SERPL-MCNC: 8.6 MG/DL (ref 8.5–10.1)
CHLORIDE SERPL-SCNC: 106 MMOL/L (ref 98–107)
CO2 SERPL-SCNC: 18 MMOL/L (ref 21–32)
CREAT SERPL-MCNC: 1.6 MG/DL (ref 0.55–1.3)
GLOBULIN SER-MCNC: 4.2 G/DL
POTASSIUM SERPL-SCNC: 4.1 MMOL/L (ref 3.5–5.1)
SODIUM SERPL-SCNC: 136 MMOL/L (ref 136–145)

## 2019-07-26 RX ADMIN — VANCOMYCIN HYDROCHLORIDE SCH MG: 500 INJECTION, POWDER, LYOPHILIZED, FOR SOLUTION INTRAVENOUS at 18:07

## 2019-07-26 RX ADMIN — TAMSULOSIN HYDROCHLORIDE SCH MG: 0.4 CAPSULE ORAL at 08:25

## 2019-07-26 RX ADMIN — VANCOMYCIN HYDROCHLORIDE SCH MG: 500 INJECTION, POWDER, LYOPHILIZED, FOR SOLUTION INTRAVENOUS at 13:53

## 2019-07-26 RX ADMIN — LORAZEPAM PRN MG: 1 TABLET ORAL at 21:31

## 2019-07-26 RX ADMIN — TAMSULOSIN HYDROCHLORIDE SCH MG: 0.4 CAPSULE ORAL at 18:07

## 2019-07-26 RX ADMIN — VANCOMYCIN HYDROCHLORIDE SCH MG: 500 INJECTION, POWDER, LYOPHILIZED, FOR SOLUTION INTRAVENOUS at 21:31

## 2019-07-26 RX ADMIN — ASPIRIN SCH MG: 81 TABLET, DELAYED RELEASE ORAL at 08:25

## 2019-07-26 NOTE — UROLOGY PROGRESS NOTE
Assessment/Plan


Assessment/Plan:


1. Staghorn renal calculus.


2. Bladder calculus.


3. Mild hydronephrosis, likely chronic.


4. Chronic kidney disease.


5. Acute kidney injury, improved.


6. Hematuria.


7. Pyuria.


8. Urinary retention.


9. Neurogenic bladder.


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





monitor clinically


pierre


monitor renal fxn


abx as ordered


flomax





Subjective


Allergies:  


Coded Allergies:  


     No Known Allergies (Unverified , 7/23/19)


Subjective


all noted, agitated yest, restraints placed





Objective





Last 24 Hour Vital Signs








  Date Time  Temp Pulse Resp B/P (MAP) Pulse Ox O2 Delivery O2 Flow Rate FiO2


 


7/26/19 16:00  73      


 


7/26/19 16:00 97.8 75 18 146/78 (100) 96   


 


7/26/19 12:00 98.6 78 18 143/81 (101) 97   


 


7/26/19 12:00  78      


 


7/26/19 09:00      Room Air  


 


7/26/19 08:00 98.6 82 18 143/69 (93) 98   


 


7/26/19 08:00  82      


 


7/26/19 04:00 97.0 87 19 141/87 (105) 98   


 


7/26/19 04:00  87      


 


7/26/19 00:00  82      


 


7/26/19 00:00 98.3 85 18 136/84 (101) 97   


 


7/25/19 21:00      Room Air  


 


7/25/19 20:00  69      


 


7/25/19 20:00 97.4 68 17 149/84 (105) 97   

















Intake and Output  


 


 7/25/19 7/26/19





 19:00 07:00


 


Intake Total 382.5 ml 870 ml


 


Output Total 1700 ml 1400 ml


 


Balance -1317.5 ml -530 ml


 


  


 


Intake Oral 360 ml 120 ml


 


IV Total 22.5 ml 750 ml


 


Output Urine Total 1700 ml 1400 ml


 


# Voids 4 


 


# Bowel Movements 1 2











Microbiology








 Date/Time


Source Procedure


Growth Status


 


 


 7/23/19 14:50


Nasal Nares MRSA Culture - Final


Staphylococcus Aureus - Mrsa Complete


 


 7/25/19 04:45


Stool Clostridium difficile Toxin Assay - Final Complete


 


 7/23/19 14:16


Urine,Clean Catch Urine Culture - Final


NO GROWTH AFTER 48 HOURS Complete


 


 7/23/19 14:50


Rectum VRE Culture - Final


NO VANCOMYCIN RESISTANT ENTEROCOCCUS ... Complete








Current Medications








 Medications


  (Trade)  Dose


 Ordered  Sig/Lul


 Route


 PRN Reason  Start Time


 Stop Time Status Last Admin


Dose Admin


 


 Acetaminophen/


 Hydrocodone Bitart


  (Norco 5/325)  1 tab  Q4H  PRN


 ORAL


 Severe Pain (Pain Scale 7-10)  7/23/19 18:30


 7/30/19 18:29  7/24/19 16:02


 


 


 Aspirin


  (Ecotrin)  81 mg  DAILY


 ORAL


   7/24/19 09:00


 8/23/19 08:59  7/26/19 08:25


 


 


 Folic Acid


  (Folate)  1 mg  DAILY


 ORAL


   7/24/19 09:00


 8/23/19 08:59  7/26/19 08:24


 


 


 Lorazepam


  (Ativan)  1 mg  Q6H  PRN


 ORAL


 For Anxiety  7/23/19 19:00


 7/30/19 18:59  7/25/19 04:58


 


 


 Multivitamins


  (Multivitamins)  1 tab  DAILY


 ORAL


   7/24/19 09:00


 8/23/19 08:59  7/26/19 08:25


 


 


 Quetiapine


 Fumarate


  (SEROquel)  25 mg  TID


 ORAL


   7/25/19 13:00


 8/24/19 12:59  7/26/19 13:53


 


 


 Sodium Chloride  1,000 ml @ 


 75 mls/hr  [id number]


 IV


   7/25/19 18:30


 8/24/19 18:29  7/26/19 06:28


 


 


 Tamsulosin HCl


  (Flomax)  0.4 mg  BID


 ORAL


   7/23/19 19:01


 8/22/19 19:00  7/26/19 08:25


 


 


 Tramadol HCl


  (Ultram)  50 mg  Q6H  PRN


 ORAL


 Moderate Pain (Pain Scale 4-6)  7/23/19 18:30


 7/30/19 18:29  7/24/19 12:10


 


 


 Vancomycin HCl


  (Firvanq)  125 mg  FOUR TIMES A  DAY


 ORAL


   7/26/19 13:00


 8/2/19 12:59  7/26/19 13:53


 





Laboratory Tests


7/26/19 06:12: 


Sodium Level 136, Potassium Level 4.1, Chloride Level 106, Carbon Dioxide Level 

18L, Anion Gap 12, Blood Urea Nitrogen 20H, Creatinine 1.6H, Estimat Glomerular 

Filtration Rate 43.6, Glucose Level 107H, Uric Acid 6.0, Calcium Level 8.6, 

Total Bilirubin 0.6, Aspartate Amino Transf (AST/SGOT) 43H, Alanine 

Aminotransferase (ALT/SGPT) 51, Alkaline Phosphatase 86, Total Protein 7.1, 

Albumin 2.9L, Globulin 4.2, Albumin/Globulin Ratio 0.7L


Height (Feet):  5


Height (Inches):  10.00


Weight (Pounds):  181


Objective


 exam stable, urine grossly clear











Israel Pink MD Jul 26, 2019 17:30

## 2019-07-26 NOTE — NEPHROLOGY PROGRESS NOTE
Assessment/Plan


Problem List:  


(1) Bladder calculi


(2) Renal insufficiency


(3) Bilateral renal stones


(4) KENYETTA (acute kidney injury)


(5) Hydronephrosis


(6) Staghorn calculus


Plan


creatinine lower, 1.6,  psych consult, will need further  procedures in future

, urine culture neg stop current antibiotic, c diff + ordered po vanco, 

colonized mrsa





Subjective


HEENT:  Reports: no symptoms


Genitourinary:  Reports: other - pierre


Neurologic/Psychiatric:  Reports: depressed


Subjective


, no distress-





Objective


Objective





Last 24 Hour Vital Signs








  Date Time  Temp Pulse Resp B/P (MAP) Pulse Ox O2 Delivery O2 Flow Rate FiO2


 


7/26/19 09:00      Room Air  


 


7/26/19 08:00 98.6 82 18 143/69 (93) 98   


 


7/26/19 08:00  82      


 


7/26/19 04:00 97.0 87 19 141/87 (105) 98   


 


7/26/19 04:00  87      


 


7/26/19 00:00  82      


 


7/26/19 00:00 98.3 85 18 136/84 (101) 97   


 


7/25/19 21:00      Room Air  


 


7/25/19 20:00  69      


 


7/25/19 20:00 97.4 68 17 149/84 (105) 97   


 


7/25/19 16:00 98.2 70 18 111/56 (74) 99   


 


7/25/19 16:00  67      

















Intake and Output  


 


 7/25/19 7/26/19





 19:00 07:00


 


Intake Total 382.5 ml 870 ml


 


Output Total 1700 ml 1400 ml


 


Balance -1317.5 ml -530 ml


 


  


 


Intake Oral 360 ml 120 ml


 


IV Total 22.5 ml 750 ml


 


Output Urine Total 1700 ml 1400 ml


 


# Voids 4 


 


# Bowel Movements 1 2








Laboratory Tests


7/26/19 06:12: 


Sodium Level 136, Potassium Level 4.1, Chloride Level 106, Carbon Dioxide Level 

18L, Anion Gap 12, Blood Urea Nitrogen 20H, Creatinine 1.6H, Estimat Glomerular 

Filtration Rate 43.6, Glucose Level 107H, Uric Acid 6.0, Calcium Level 8.6, 

Total Bilirubin 0.6, Aspartate Amino Transf (AST/SGOT) 43H, Alanine 

Aminotransferase (ALT/SGPT) 51, Alkaline Phosphatase 86, Total Protein 7.1, 

Albumin 2.9L, Globulin 4.2, Albumin/Globulin Ratio 0.7L


Height (Feet):  5


Height (Inches):  10.00


Weight (Pounds):  181


General Appearance:  no apparent distress, alert


EENT:  normal ENT inspection


Neck:  normal alignment


Cardiovascular:  normal rate, regular rhythm


Respiratory/Chest:  lungs clear


Abdomen:  no organomegaly


Extremities:  other - no edema


Neurologic:  CNs II-XII grossly normal











Manny Eldridge MD Jul 26, 2019 12:39

## 2019-07-26 NOTE — CARDIOLOGY REPORT
--------------- APPROVED REPORT --------------





EXAM: Two-dimensional and M-mode echocardiogram with Doppler and color Doppler.



M-Mode DIMENSIONS 

IVSd1.2 (0.7-1.1cm)Left Atrium (MM)3.1 (1.6-4.0cm)

LVDd4.0 (3.5-5.6cm)Aortic Root2.6 (2.0-3.7cm)

PWd1.0 (0.7-1.1cm)Aortic Cusp Exc.1.9 (1.5-2.0cm)

IVSs1.5 cm

LVDs1.6 (2.5-4.0cm)

PWs1.5 cm





Normal left ventricular chamber size, systolic function and wall motion .

Left ventricular ejection fraction estimated to be  60-65%.

Mild left ventricular hypertrophy by 2-D.

No evidence of  pericardial effusion.

All other cardiac chamber sizes are within normal limits. 

Aortic valve calcification with normal cusp excursion .

Mildly thickened mitral valve leaflets with normal excursion.

Mild mitral annulus and aortic root calcification.

Pulmonic valve not well visualized.

IVC at normal size with physiologic collapse .



A  color flow and spectral Doppler study was performed and revealed:

No aortic insufficiency .

Mitral diastolic velocities suggest reduced left ventricular relaxation c/w mild LV 

diastolic dysfunction (Grade I )

Trace mitral regurgitation.

Trace  tricuspid regurgitation.

Tricuspid  systolic velocities suggests peak right ventricular systolic pressure of  

35mmHg.

## 2019-07-26 NOTE — PROGRESS NOTE
DATE:  07/26/2019

INTERNAL MEDICINE PROGRESS NOTE



SUBJECTIVE:  The patient is voiding adequately.  Yang catheter in place.



OBJECTIVE:

VITAL SIGNS:  Blood pressure is 146/78, heart rate 75, respiratory rate

18.

LUNGS:  Clear.

CARDIAC:  Regular.

ABDOMEN:  Soft.

EXTREMITIES:  No edema.



LABORATORY DATA:  Urine culture is negative, but stool is positive for C.

difficile.  Sodium 136, potassium 4.1, bicarb 18, BUN 20, creatinine

1.6.



IMPRESSION:

1. C. difficile colitis.

2. Moderate protein-calorie malnutrition.

3. Acute on chronic renal failure, recovering.

4. Nephrolithiasis and obstructive uropathy, improved.



PLAN:

1. Continue hydration.

2. Continue Yang catheter.

3. Therapy for C. difficile.

4. Isolation precaution.

5. Urologic procedures in the future for calculi.

6. Restart and titrate antiHTN meds; avoid ACEi and ARB.









  ______________________________________________

  Peter Bolaños M.D.





DR:  CHEVY

D:  07/26/2019 22:26

T:  07/26/2019 22:34

JOB#:  [id number]

CC:



JIHAN

## 2019-07-26 NOTE — PROGRESS NOTE
DATE:  07/25/2019

INTERNAL MEDICINE PROGRESS NOTE



SUBJECTIVE:  The patient's renal function is improving with hydration.  He

is intermittently noncompliant with care.



OBJECTIVE:

VITAL SIGNS:  Blood pressure 148/73, pulse 61, and respirations 14.

LUNGS:  Clear.

CARDIAC:  Regular.

ABDOMEN:  Soft.  No CVA tenderness.

EXTREMITIES:  No edema.

GENITOURINARY:  The Yang catheter site has some tenderness.



LABORATORY DATA:  White count 7.6 and hemoglobin 11.2.  Potassium 4.3.  BUN

22 and creatinine 1.8.



IMPRESSION:

1. Resolving renal failure.

2. Urolithiasis and nephrolithiasis.

3. Hyperuricemia.



PLAN:

1. Consider allopurinol.

2. Continue hydration.

3. Consideration for outpatient urologic procedures for chronic

calculi.

4. Discharge planning.









  ______________________________________________

  Peter Bolaños M.D.





DR:  AKHIL

D:  07/25/2019 18:12

T:  07/26/2019 02:10

JOB#:  [id number]

CC:

## 2019-07-27 VITALS — DIASTOLIC BLOOD PRESSURE: 77 MMHG | SYSTOLIC BLOOD PRESSURE: 132 MMHG

## 2019-07-27 VITALS — SYSTOLIC BLOOD PRESSURE: 155 MMHG | DIASTOLIC BLOOD PRESSURE: 79 MMHG

## 2019-07-27 VITALS — SYSTOLIC BLOOD PRESSURE: 106 MMHG | DIASTOLIC BLOOD PRESSURE: 65 MMHG

## 2019-07-27 VITALS — DIASTOLIC BLOOD PRESSURE: 80 MMHG | SYSTOLIC BLOOD PRESSURE: 125 MMHG

## 2019-07-27 VITALS — DIASTOLIC BLOOD PRESSURE: 73 MMHG | SYSTOLIC BLOOD PRESSURE: 116 MMHG

## 2019-07-27 VITALS — SYSTOLIC BLOOD PRESSURE: 131 MMHG | DIASTOLIC BLOOD PRESSURE: 77 MMHG

## 2019-07-27 LAB
ADD MANUAL DIFF: NO
ANION GAP SERPL CALC-SCNC: 10 MMOL/L (ref 5–15)
BASOPHILS NFR BLD AUTO: 0.9 % (ref 0–2)
BUN SERPL-MCNC: 12 MG/DL (ref 7–18)
CALCIUM SERPL-MCNC: 8.7 MG/DL (ref 8.5–10.1)
CHLORIDE SERPL-SCNC: 109 MMOL/L (ref 98–107)
CO2 SERPL-SCNC: 19 MMOL/L (ref 21–32)
CREAT SERPL-MCNC: 1.3 MG/DL (ref 0.55–1.3)
EOSINOPHIL NFR BLD AUTO: 6.3 % (ref 0–3)
ERYTHROCYTE [DISTWIDTH] IN BLOOD BY AUTOMATED COUNT: 13.2 % (ref 11.6–14.8)
HCT VFR BLD CALC: 37.1 % (ref 42–52)
HGB BLD-MCNC: 12.4 G/DL (ref 14.2–18)
LYMPHOCYTES NFR BLD AUTO: 15.6 % (ref 20–45)
MCV RBC AUTO: 89 FL (ref 80–99)
MONOCYTES NFR BLD AUTO: 9.9 % (ref 1–10)
NEUTROPHILS NFR BLD AUTO: 67.3 % (ref 45–75)
PLATELET # BLD: 131 K/UL (ref 150–450)
POTASSIUM SERPL-SCNC: 3.6 MMOL/L (ref 3.5–5.1)
RBC # BLD AUTO: 4.16 M/UL (ref 4.7–6.1)
SODIUM SERPL-SCNC: 138 MMOL/L (ref 136–145)
WBC # BLD AUTO: 8.3 K/UL (ref 4.8–10.8)

## 2019-07-27 RX ADMIN — VANCOMYCIN HYDROCHLORIDE SCH MG: 500 INJECTION, POWDER, LYOPHILIZED, FOR SOLUTION INTRAVENOUS at 21:40

## 2019-07-27 RX ADMIN — VANCOMYCIN HYDROCHLORIDE SCH MG: 500 INJECTION, POWDER, LYOPHILIZED, FOR SOLUTION INTRAVENOUS at 17:21

## 2019-07-27 RX ADMIN — VANCOMYCIN HYDROCHLORIDE SCH MG: 500 INJECTION, POWDER, LYOPHILIZED, FOR SOLUTION INTRAVENOUS at 12:40

## 2019-07-27 RX ADMIN — ASPIRIN SCH MG: 81 TABLET, DELAYED RELEASE ORAL at 08:56

## 2019-07-27 RX ADMIN — VANCOMYCIN HYDROCHLORIDE SCH MG: 500 INJECTION, POWDER, LYOPHILIZED, FOR SOLUTION INTRAVENOUS at 08:55

## 2019-07-27 RX ADMIN — ATENOLOL SCH MG: 25 TABLET ORAL at 08:56

## 2019-07-27 RX ADMIN — TAMSULOSIN HYDROCHLORIDE SCH MG: 0.4 CAPSULE ORAL at 08:56

## 2019-07-27 RX ADMIN — TAMSULOSIN HYDROCHLORIDE SCH MG: 0.4 CAPSULE ORAL at 17:21

## 2019-07-27 NOTE — PROGRESS NOTE
DATE:  07/27/2019

SUBJECTIVE:  The patient is doing better.  More alert and less agitated.

However, still is disoriented and has episodes of agitation.



MENTAL STATUS EXAMINATION:  The patient is alert and oriented to time,

self, and place.  Disoriented to date.  Poor insight into the situation he

is in.  Mood is agitated and anxious.  Affect is constricted.  Congruent

mood.  Thought process is concrete.  Thought content, no suicidal or

homicidal ideation.



ASSESSMENT:  Agitation.



PLAN:  We will continue the current medications.  Provide the patient with

reality orientation and supportive therapy.









  ______________________________________________

  Morris Meyers M.D.





DR:  GIANNA

D:  07/27/2019 00:02

T:  07/27/2019 02:08

JOB#:  [id number]

CC:

## 2019-07-27 NOTE — PROGRESS NOTE
DATE:  07/27/2019

INTERNAL MEDICINE PROGRESS NOTE



SUBJECTIVE:  Yang remains in place.  Renal function is returning to

normal.



OBJECTIVE:

VITAL SIGNS:  Blood pressure 106/65 to 155/79, pulse 60, respirations 18,

and afebrile.

LUNGS:  Clear.

CARDIAC:  Regular.

ABDOMEN:  Soft.

EXTREMITIES:  No edema.



LABORATORY DATA:  BUN is now 12 with creatinine 1.3 and potassium 3.6.

White count is 8.3 and hemoglobin 12.4.



IMPRESSION:

1. C. diff.

2. Staghorn calculus.

3. Acute renal failure, resolving.

4. Psychiatric disorder with episodes of agitation.

5. Hydronephrosis.

6. Probable neurogenic bladder.



PLAN:

1. Consider voiding trial, to discuss with urologist.

2. Continue p.o. vancomycin for C. diff.

3. Maintain adequate hydration.

4. Discharge planning.









  ______________________________________________

  Peter Bolaños M.D.





DR:  AKHIL

D:  07/27/2019 18:18

T:  07/27/2019 19:11

JOB#:  [id number]

CC:

## 2019-07-27 NOTE — UROLOGY PROGRESS NOTE
Assessment/Plan


Assessment/Plan:


1. Staghorn renal calculus.


2. Bladder calculus.


3. Mild hydronephrosis, likely chronic.


4. Chronic kidney disease.


5. Acute kidney injury, improved.


6. Hematuria.


7. Pyuria.


8. Urinary retention.


9. Neurogenic bladder.


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





monitor clinically


pierre


monitor renal fxn


abx as ordered


flomax


voiding trial at some point?





Subjective


Allergies:  


Coded Allergies:  


     No Known Allergies (Unverified , 7/23/19)


Subjective


all noted, agitated yest, restraints placed





Objective





Last 24 Hour Vital Signs








  Date Time  Temp Pulse Resp B/P (MAP) Pulse Ox O2 Delivery O2 Flow Rate FiO2


 


7/27/19 16:00 96.8 60 18 116/73 (87) 96   


 


7/27/19 11:49 97.8 60 18 106/65 (79) 96   


 


7/27/19 09:00      Room Air  


 


7/27/19 08:57  64  155/79    


 


7/27/19 08:56  64  155/79    


 


7/27/19 08:01 98.0 64 18 155/79 (104) 97   


 


7/27/19 04:00 98.5 61 18 131/77 (95)    





  61      


 


7/27/19 00:00 97.5 71 20 132/77 (95) 96   


 


7/27/19 00:00  72      


 


7/26/19 21:00      Room Air  

















Intake and Output  


 


 7/26/19 7/27/19





 19:00 07:00


 


Intake Total 500 ml 120 ml


 


Output Total 1200 ml 


 


Balance -700 ml 120 ml


 


  


 


Intake Oral 500 ml 120 ml


 


Output Urine Total 1200 ml 


 


# Voids 3 


 


# Bowel Movements 4 











Microbiology








 Date/Time


Source Procedure


Growth Status


 


 


 7/23/19 14:50


Nasal Nares MRSA Culture - Final


Staphylococcus Aureus - Mrsa Complete


 


 7/25/19 04:45


Stool Clostridium difficile Toxin Assay - Final Complete


 


 7/23/19 14:16


Urine,Clean Catch Urine Culture - Final


NO GROWTH AFTER 48 HOURS Complete


 


 7/23/19 14:50


Rectum VRE Culture - Final


NO VANCOMYCIN RESISTANT ENTEROCOCCUS ... Complete








Current Medications








 Medications


  (Trade)  Dose


 Ordered  Sig/Lul


 Route


 PRN Reason  Start Time


 Stop Time Status Last Admin


Dose Admin


 


 Acetaminophen/


 Hydrocodone Bitart


  (Norco 5/325)  1 tab  Q4H  PRN


 ORAL


 Severe Pain (Pain Scale 7-10)  7/27/19 02:30


 7/30/19 18:29   


 


 


 Amlodipine


 Besylate


  (Norvasc)  10 mg  DAILY


 ORAL


   7/27/19 09:00


 8/26/19 08:59  7/27/19 08:57


 


 


 Aspirin


  (Ecotrin)  81 mg  DAILY


 ORAL


   7/27/19 09:00


 8/23/19 08:59  7/27/19 08:56


 


 


 Atenolol


  (Tenormin)  25 mg  DAILY


 ORAL


   7/27/19 09:00


 8/26/19 08:59  7/27/19 08:56


 


 


 Folic Acid


  (Folate)  1 mg  DAILY


 ORAL


   7/27/19 09:00


 8/23/19 08:59  7/27/19 08:57


 


 


 Lorazepam


  (Ativan)  1 mg  Q6H  PRN


 ORAL


 For Anxiety  7/27/19 02:08


 7/30/19 02:07   


 


 


 Multivitamins


  (Multivitamins)  1 tab  DAILY


 ORAL


   7/27/19 09:00


 8/23/19 08:59  7/27/19 08:57


 


 


 Quetiapine


 Fumarate


  (SEROquel)  25 mg  TID


 ORAL


   7/27/19 09:00


 8/24/19 12:59  7/27/19 17:21


 


 


 Sodium Chloride  1,000 ml @ 


 75 mls/hr  [id number]


 IV


   7/27/19 03:00


 8/24/19 02:59  7/27/19 16:38


 


 


 Tamsulosin HCl


  (Flomax)  0.4 mg  BID


 ORAL


   7/27/19 09:00


 8/22/19 19:00  7/27/19 17:21


 


 


 Tramadol HCl


  (Ultram)  50 mg  Q6H  PRN


 ORAL


 Moderate Pain (Pain Scale 4-6)  7/27/19 06:30


 7/30/19 18:29   


 


 


 Vancomycin HCl


  (Firvanq)  125 mg  FOUR TIMES A  DAY


 ORAL


   7/27/19 09:00


 8/2/19 12:59  7/27/19 17:21


 





Laboratory Tests


7/27/19 06:00: 


White Blood Count 8.3, Red Blood Count 4.16L, Hemoglobin 12.4L, Hematocrit 37.1L

, Mean Corpuscular Volume 89, Mean Corpuscular Hemoglobin 29.7, Mean 

Corpuscular Hemoglobin Concent 33.3, Red Cell Distribution Width 13.2, Platelet 

Count 131L, Mean Platelet Volume 6.6, Neutrophils (%) (Auto) 67.3, Lymphocytes (

%) (Auto) 15.6L, Monocytes (%) (Auto) 9.9, Eosinophils (%) (Auto) 6.3H, 

Basophils (%) (Auto) 0.9, Sodium Level 138, Potassium Level 3.6, Chloride Level 

109H, Carbon Dioxide Level 19L, Anion Gap 10, Blood Urea Nitrogen 12, 

Creatinine 1.3, Estimat Glomerular Filtration Rate 55.4, Glucose Level 112H, 

Uric Acid 5.8, Calcium Level 8.7


Height (Feet):  5


Height (Inches):  10.00


Weight (Pounds):  181


Objective


 exam stable, urine grossly clear











Israel Pink MD Jul 27, 2019 20:24

## 2019-07-27 NOTE — NEPHROLOGY PROGRESS NOTE
Assessment/Plan


Problem List:  


(1) Bladder calculi


(2) Renal insufficiency


(3) Bilateral renal stones


(4) KENYETTA (acute kidney injury)


(5) Hydronephrosis


(6) Staghorn calculus


Plan


creatinine lower, 1.6, 1.3 psych consult, will need further  procedures in 

future, urine culture neg stop current antibiotic, c diff + ordered po vanco, 

colonized mrsa





Subjective


Constitutional:  Reports: no symptoms


HEENT:  Reports: no symptoms


Genitourinary:  Reports: no symptoms


Neurologic/Psychiatric:  Reports: no symptoms


Subjective


, no distress-





Objective


Objective





Last 24 Hour Vital Signs








  Date Time  Temp Pulse Resp B/P (MAP) Pulse Ox O2 Delivery O2 Flow Rate FiO2


 


7/27/19 11:49 97.8 60 18 106/65 (79) 96   


 


7/27/19 08:57  64  155/79    


 


7/27/19 08:56  64  155/79    


 


7/27/19 08:01 98.0 64 18 155/79 (104) 97   


 


7/27/19 04:00 98.5 61 18 131/77 (95)    





  61      


 


7/27/19 00:00 97.5 71 20 132/77 (95) 96   


 


7/27/19 00:00  72      


 


7/26/19 21:00      Room Air  


 


7/26/19 20:00  77      


 


7/26/19 20:00 98.2 79 20 150/76 (100) 97   


 


7/26/19 16:00  73      


 


7/26/19 16:00 97.8 75 18 146/78 (100) 96   

















Intake and Output  


 


 7/26/19 7/27/19





 19:00 07:00


 


Intake Total 500 ml 120 ml


 


Output Total 1200 ml 


 


Balance -700 ml 120 ml


 


  


 


Intake Oral 500 ml 120 ml


 


Output Urine Total 1200 ml 


 


# Voids 3 


 


# Bowel Movements 4 








Laboratory Tests


7/27/19 06:00: 


White Blood Count 8.3, Red Blood Count 4.16L, Hemoglobin 12.4L, Hematocrit 37.1L

, Mean Corpuscular Volume 89, Mean Corpuscular Hemoglobin 29.7, Mean 

Corpuscular Hemoglobin Concent 33.3, Red Cell Distribution Width 13.2, Platelet 

Count 131L, Mean Platelet Volume 6.6, Neutrophils (%) (Auto) 67.3, Lymphocytes (

%) (Auto) 15.6L, Monocytes (%) (Auto) 9.9, Eosinophils (%) (Auto) 6.3H, 

Basophils (%) (Auto) 0.9, Sodium Level 138, Potassium Level 3.6, Chloride Level 

109H, Carbon Dioxide Level 19L, Anion Gap 10, Blood Urea Nitrogen 12, 

Creatinine 1.3, Estimat Glomerular Filtration Rate 55.4, Glucose Level 112H, 

Uric Acid 5.8, Calcium Level 8.7


Height (Feet):  5


Height (Inches):  10.00


Weight (Pounds):  181


General Appearance:  no apparent distress, alert


EENT:  normal ENT inspection


Neck:  normal alignment


Cardiovascular:  normal rate


Respiratory/Chest:  lungs clear


Abdomen:  non tender, soft


Neurologic:  CNs II-XII grossly normal











Manny Eldridge MD Jul 27, 2019 13:54

## 2019-07-28 VITALS — DIASTOLIC BLOOD PRESSURE: 70 MMHG | SYSTOLIC BLOOD PRESSURE: 133 MMHG

## 2019-07-28 VITALS — SYSTOLIC BLOOD PRESSURE: 138 MMHG | DIASTOLIC BLOOD PRESSURE: 75 MMHG

## 2019-07-28 VITALS — SYSTOLIC BLOOD PRESSURE: 110 MMHG | DIASTOLIC BLOOD PRESSURE: 58 MMHG

## 2019-07-28 VITALS — SYSTOLIC BLOOD PRESSURE: 133 MMHG | DIASTOLIC BLOOD PRESSURE: 75 MMHG

## 2019-07-28 VITALS — SYSTOLIC BLOOD PRESSURE: 135 MMHG | DIASTOLIC BLOOD PRESSURE: 80 MMHG

## 2019-07-28 VITALS — SYSTOLIC BLOOD PRESSURE: 127 MMHG | DIASTOLIC BLOOD PRESSURE: 72 MMHG

## 2019-07-28 VITALS — SYSTOLIC BLOOD PRESSURE: 116 MMHG | DIASTOLIC BLOOD PRESSURE: 67 MMHG

## 2019-07-28 RX ADMIN — TAMSULOSIN HYDROCHLORIDE SCH MG: 0.4 CAPSULE ORAL at 09:13

## 2019-07-28 RX ADMIN — VANCOMYCIN HYDROCHLORIDE SCH MG: 500 INJECTION, POWDER, LYOPHILIZED, FOR SOLUTION INTRAVENOUS at 13:44

## 2019-07-28 RX ADMIN — TAMSULOSIN HYDROCHLORIDE SCH MG: 0.4 CAPSULE ORAL at 18:09

## 2019-07-28 RX ADMIN — ASPIRIN SCH MG: 81 TABLET, DELAYED RELEASE ORAL at 09:13

## 2019-07-28 RX ADMIN — VANCOMYCIN HYDROCHLORIDE SCH MG: 500 INJECTION, POWDER, LYOPHILIZED, FOR SOLUTION INTRAVENOUS at 21:14

## 2019-07-28 RX ADMIN — VANCOMYCIN HYDROCHLORIDE SCH MG: 500 INJECTION, POWDER, LYOPHILIZED, FOR SOLUTION INTRAVENOUS at 09:13

## 2019-07-28 RX ADMIN — VANCOMYCIN HYDROCHLORIDE SCH MG: 500 INJECTION, POWDER, LYOPHILIZED, FOR SOLUTION INTRAVENOUS at 18:09

## 2019-07-28 RX ADMIN — ATENOLOL SCH MG: 25 TABLET ORAL at 09:00

## 2019-07-28 NOTE — PROGRESS NOTE
DATE:  07/28/2019

INTERNAL MEDICINE PROGRESS NOTE



SUBJECTIVE:  The patient pulled out his Yang catheter.  No bleeding noted.

He has voided once since.



OBJECTIVE:

VITAL SIGNS:  Blood pressure 133/70, pulse 52, respirations 16, and

afebrile.

LUNGS:  Clear.

CARDIAC:  Regular.

ABDOMEN:  Soft.

EXTREMITIES:  No edema.



IMPRESSION:

1. Calculi.

2. Acute renal failure, resolved.

3. Clostridium difficile colitis.

4. Bradycardia on beta-blocker.



PLAN:

1. Continue vancomycin orally.

2. Maintenance hydration.

3. Decrease beta-blocker.

4. Monitor renal output.

5. Decrease beta-blocker dosing.









  ______________________________________________

  Peter Bolaños M.D.





DR:  Vangie

D:  07/28/2019 15:38

T:  07/28/2019 18:42

JOB#:  [id number]

CC:

## 2019-07-28 NOTE — UROLOGY PROGRESS NOTE
Assessment/Plan


Assessment/Plan:


1. Staghorn renal calculus.


2. Bladder calculus.


3. Mild hydronephrosis, likely chronic.


4. Chronic kidney disease.


5. Acute kidney injury, improved.


6. Hematuria.


7. Pyuria.


8. Urinary retention.


9. Neurogenic bladder.


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





monitor clinically


pierre


monitor renal fxn


abx as ordered


flomax


voiding trial at some point?





Subjective


Allergies:  


Coded Allergies:  


     No Known Allergies (Unverified , 7/23/19)


Subjective


all noted, agitated yest, restraints placed





Objective





Last 24 Hour Vital Signs








  Date Time  Temp Pulse Resp B/P (MAP) Pulse Ox O2 Delivery O2 Flow Rate FiO2


 


7/28/19 09:14  53  133/75    


 


7/28/19 09:10  53  133/75 (94)    


 


7/28/19 09:00      Room Air  


 


7/28/19 08:00 97.9 53 16 110/58 (75)    


 


7/28/19 04:00 97.6 66 19 138/75 (96)    





  66      


 


7/28/19 00:00 98.6 60 18 116/67 (83)    





  60      


 


7/27/19 22:05      Room Air  


 


7/27/19 20:00 98.6 62 19 125/80 (95)    





  62      





  62      


 


7/27/19 16:00 96.8 60 18 116/73 (87) 96   

















Intake and Output  


 


 7/27/19 7/28/19





 18:59 06:59


 


Intake Total 360 ml 675 ml


 


Output Total  1100 ml


 


Balance 360 ml -425 ml


 


  


 


Intake Oral 360 ml 


 


IV Total  675 ml


 


Output Urine Total  1100 ml











Microbiology








 Date/Time


Source Procedure


Growth Status


 


 


 7/23/19 14:50


Nasal Nares MRSA Culture - Final


Staphylococcus Aureus - Mrsa Complete


 


 7/25/19 04:45


Stool Clostridium difficile Toxin Assay - Final Complete


 


 7/23/19 14:16


Urine,Clean Catch Urine Culture - Final


NO GROWTH AFTER 48 HOURS Complete


 


 7/23/19 14:50


Rectum VRE Culture - Final


NO VANCOMYCIN RESISTANT ENTEROCOCCUS ... Complete








Current Medications








 Medications


  (Trade)  Dose


 Ordered  Sig/Lul


 Route


 PRN Reason  Start Time


 Stop Time Status Last Admin


Dose Admin


 


 Acetaminophen/


 Hydrocodone Bitart


  (Norco 5/325)  1 tab  Q4H  PRN


 ORAL


 Severe Pain (Pain Scale 7-10)  7/27/19 02:30


 7/30/19 18:29   


 


 


 Amlodipine


 Besylate


  (Norvasc)  10 mg  DAILY


 ORAL


   7/27/19 09:00


 8/26/19 08:59  7/28/19 09:14


 


 


 Aspirin


  (Ecotrin)  81 mg  DAILY


 ORAL


   7/27/19 09:00


 8/23/19 08:59  7/28/19 09:13


 


 


 Atenolol


  (Tenormin)  25 mg  DAILY


 ORAL


   7/27/19 09:00


 8/26/19 08:59  7/27/19 08:56


 


 


 Folic Acid


  (Folate)  1 mg  DAILY


 ORAL


   7/27/19 09:00


 8/23/19 08:59  7/28/19 09:13


 


 


 Lorazepam


  (Ativan)  1 mg  Q6H  PRN


 ORAL


 For Anxiety  7/27/19 02:08


 7/30/19 02:07   


 


 


 Multivitamins


  (Multivitamins)  1 tab  DAILY


 ORAL


   7/27/19 09:00


 8/23/19 08:59  7/28/19 09:13


 


 


 Quetiapine


 Fumarate


  (SEROquel)  25 mg  TID


 ORAL


   7/27/19 09:00


 8/24/19 12:59  7/28/19 09:51


 


 


 Sodium Chloride  1,000 ml @ 


 75 mls/hr  [id number]


 IV


   7/27/19 03:00


 8/24/19 02:59  7/28/19 04:56


 


 


 Tamsulosin HCl


  (Flomax)  0.4 mg  BID


 ORAL


   7/27/19 09:00


 8/22/19 19:00  7/28/19 09:13


 


 


 Tramadol HCl


  (Ultram)  50 mg  Q6H  PRN


 ORAL


 Moderate Pain (Pain Scale 4-6)  7/27/19 06:30


 7/30/19 18:29   


 


 


 Vancomycin HCl


  (Firvanq)  125 mg  FOUR TIMES A  DAY


 ORAL


   7/27/19 09:00


 8/2/19 12:59  7/28/19 09:13


 








Height (Feet):  5


Height (Inches):  10.00


Weight (Pounds):  181


Objective


 exam stable, urine grossly clear











Israel Pink MD Jul 28, 2019 12:14

## 2019-07-28 NOTE — NEPHROLOGY PROGRESS NOTE
Assessment/Plan


Problem List:  


(1) Bladder calculi


(2) Renal insufficiency


(3) Bilateral renal stones


(4) KENYETTA (acute kidney injury)


(5) Hydronephrosis


(6) Staghorn calculus


Plan


creatinine lower, 1.6, 1.3 psych consult, will need further  procedures in 

future, urine culture neg stop current antibiotic, c diff + ordered po vanco, 

colonized mrsa





Subjective


Constitutional:  Reports: weakness


HEENT:  Reports: no symptoms


Genitourinary:  Reports: other - pierre


Neurologic/Psychiatric:  Reports: no symptoms


Subjective


, no distress-





Objective


Objective





Last 24 Hour Vital Signs








  Date Time  Temp Pulse Resp B/P (MAP) Pulse Ox O2 Delivery O2 Flow Rate FiO2


 


7/28/19 09:14  53  133/75    


 


7/28/19 09:10  53  133/75 (94)    


 


7/28/19 09:00      Room Air  


 


7/28/19 08:00 97.9 53 16 110/58 (75)    


 


7/28/19 04:00 97.6 66 19 138/75 (96)    





  66      


 


7/28/19 00:00 98.6 60 18 116/67 (83)    





  60      


 


7/27/19 22:05      Room Air  


 


7/27/19 20:00 98.6 62 19 125/80 (95)    





  62      





  62      


 


7/27/19 16:00 96.8 60 18 116/73 (87) 96   

















Intake and Output  


 


 7/27/19 7/28/19





 18:59 06:59


 


Intake Total 360 ml 675 ml


 


Output Total  1100 ml


 


Balance 360 ml -425 ml


 


  


 


Intake Oral 360 ml 


 


IV Total  675 ml


 


Output Urine Total  1100 ml








Height (Feet):  5


Height (Inches):  10.00


Weight (Pounds):  181


General Appearance:  no apparent distress


EENT:  other - L eye blind


Neck:  normal alignment


Cardiovascular:  normal rate, regular rhythm


Respiratory/Chest:  lungs clear


Abdomen:  non tender, soft


Neurologic:  CNs II-XII grossly normal











Manny Eldridge MD Jul 28, 2019 12:23

## 2019-07-29 VITALS — SYSTOLIC BLOOD PRESSURE: 145 MMHG | DIASTOLIC BLOOD PRESSURE: 79 MMHG

## 2019-07-29 VITALS — SYSTOLIC BLOOD PRESSURE: 134 MMHG | DIASTOLIC BLOOD PRESSURE: 67 MMHG

## 2019-07-29 VITALS — SYSTOLIC BLOOD PRESSURE: 143 MMHG | DIASTOLIC BLOOD PRESSURE: 61 MMHG

## 2019-07-29 VITALS — DIASTOLIC BLOOD PRESSURE: 77 MMHG | SYSTOLIC BLOOD PRESSURE: 140 MMHG

## 2019-07-29 VITALS — DIASTOLIC BLOOD PRESSURE: 83 MMHG | SYSTOLIC BLOOD PRESSURE: 143 MMHG

## 2019-07-29 VITALS — DIASTOLIC BLOOD PRESSURE: 69 MMHG | SYSTOLIC BLOOD PRESSURE: 131 MMHG

## 2019-07-29 LAB
ADD MANUAL DIFF: NO
ALBUMIN SERPL-MCNC: 2.4 G/DL (ref 3.4–5)
ALBUMIN/GLOB SERPL: 0.6 {RATIO} (ref 1–2.7)
ALP SERPL-CCNC: 86 U/L (ref 46–116)
ALT SERPL-CCNC: 56 U/L (ref 12–78)
ANION GAP SERPL CALC-SCNC: 9 MMOL/L (ref 5–15)
AST SERPL-CCNC: 56 U/L (ref 15–37)
BASOPHILS NFR BLD AUTO: 1.6 % (ref 0–2)
BILIRUB SERPL-MCNC: 0.3 MG/DL (ref 0.2–1)
BUN SERPL-MCNC: 12 MG/DL (ref 7–18)
CALCIUM SERPL-MCNC: 7.9 MG/DL (ref 8.5–10.1)
CHLORIDE SERPL-SCNC: 113 MMOL/L (ref 98–107)
CO2 SERPL-SCNC: 22 MMOL/L (ref 21–32)
CREAT SERPL-MCNC: 1.3 MG/DL (ref 0.55–1.3)
EOSINOPHIL NFR BLD AUTO: 9.3 % (ref 0–3)
ERYTHROCYTE [DISTWIDTH] IN BLOOD BY AUTOMATED COUNT: 13.6 % (ref 11.6–14.8)
GLOBULIN SER-MCNC: 3.8 G/DL
HCT VFR BLD CALC: 36.5 % (ref 42–52)
HGB BLD-MCNC: 12 G/DL (ref 14.2–18)
LYMPHOCYTES NFR BLD AUTO: 29 % (ref 20–45)
MCV RBC AUTO: 90 FL (ref 80–99)
MONOCYTES NFR BLD AUTO: 10.9 % (ref 1–10)
NEUTROPHILS NFR BLD AUTO: 49.2 % (ref 45–75)
PLATELET # BLD: 159 K/UL (ref 150–450)
POTASSIUM SERPL-SCNC: 3.5 MMOL/L (ref 3.5–5.1)
RBC # BLD AUTO: 4.07 M/UL (ref 4.7–6.1)
SODIUM SERPL-SCNC: 144 MMOL/L (ref 136–145)
WBC # BLD AUTO: 5.9 K/UL (ref 4.8–10.8)

## 2019-07-29 RX ADMIN — VANCOMYCIN HYDROCHLORIDE SCH MG: 500 INJECTION, POWDER, LYOPHILIZED, FOR SOLUTION INTRAVENOUS at 13:33

## 2019-07-29 RX ADMIN — VANCOMYCIN HYDROCHLORIDE SCH MG: 500 INJECTION, POWDER, LYOPHILIZED, FOR SOLUTION INTRAVENOUS at 09:01

## 2019-07-29 RX ADMIN — ASPIRIN SCH MG: 81 TABLET, DELAYED RELEASE ORAL at 09:02

## 2019-07-29 RX ADMIN — TAMSULOSIN HYDROCHLORIDE SCH MG: 0.4 CAPSULE ORAL at 09:02

## 2019-07-29 RX ADMIN — VANCOMYCIN HYDROCHLORIDE SCH MG: 500 INJECTION, POWDER, LYOPHILIZED, FOR SOLUTION INTRAVENOUS at 18:06

## 2019-07-29 RX ADMIN — VANCOMYCIN HYDROCHLORIDE SCH MG: 500 INJECTION, POWDER, LYOPHILIZED, FOR SOLUTION INTRAVENOUS at 20:25

## 2019-07-29 RX ADMIN — ATENOLOL SCH MG: 25 TABLET ORAL at 09:03

## 2019-07-29 RX ADMIN — TAMSULOSIN HYDROCHLORIDE SCH MG: 0.4 CAPSULE ORAL at 18:07

## 2019-07-29 NOTE — UROLOGY PROGRESS NOTE
Assessment/Plan


Assessment/Plan:


1. Staghorn renal calculus.


2. Bladder calculus.


3. Mild hydronephrosis, likely chronic.


4. Chronic kidney disease.


5. Acute kidney injury, improved.


6. Hematuria.


7. Pyuria.


8. Urinary retention.


9. Neurogenic bladder.


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





monitor clinically


pierre out and voiding


monitor renal fxn, stable


abx as ordered


cont flomax BID


pt can have tx of stones later electively by urologist in his plan





Subjective


Allergies:  


Coded Allergies:  


     No Known Allergies (Unverified , 7/23/19)


Subjective


all noted, pierre "fell out" last night, voiding, comfortable





Objective





Last 24 Hour Vital Signs








  Date Time  Temp Pulse Resp B/P (MAP) Pulse Ox O2 Delivery O2 Flow Rate FiO2


 


7/29/19 08:00 98.0 66 18 140/77 (98) 98   


 


7/29/19 04:00 97.7 58 18 145/79 (101) 97   


 


7/29/19 00:00 98.1 61 18 143/61 (88) 95   


 


7/28/19 21:00      Room Air  


 


7/28/19 20:00 98.2 64 18 135/80 (98) 95   


 


7/28/19 16:00 98.4 64 17 127/72 (90) 95   


 


7/28/19 12:00 98.1 52 16 133/70 (91)    





  52      


 


7/28/19 09:14  53  133/75    


 


7/28/19 09:10  53  133/75 (94)    


 


7/28/19 09:00  52  133/70    


 


7/28/19 09:00      Room Air  

















Intake and Output  


 


 7/28/19 7/29/19





 19:00 07:00


 


Intake Total 1665 ml 1500 ml


 


Output Total 300 ml 150 ml


 


Balance 1365 ml 1350 ml


 


  


 


Intake Oral 840 ml 600 ml


 


IV Total 825 ml 900 ml


 


Output Urine Total 300 ml 150 ml


 


# Voids 2 1


 


# Bowel Movements 1 











Microbiology








 Date/Time


Source Procedure


Growth Status


 


 


 7/23/19 14:50


Nasal Nares MRSA Culture - Final


Staphylococcus Aureus - Mrsa Complete


 


 7/25/19 04:45


Stool Clostridium difficile Toxin Assay - Final Complete


 


 7/23/19 14:16


Urine,Clean Catch Urine Culture - Final


NO GROWTH AFTER 48 HOURS Complete


 


 7/23/19 14:50


Rectum VRE Culture - Final


NO VANCOMYCIN RESISTANT ENTEROCOCCUS ... Complete








Current Medications








 Medications


  (Trade)  Dose


 Ordered  Sig/Lul


 Route


 PRN Reason  Start Time


 Stop Time Status Last Admin


Dose Admin


 


 Acetaminophen/


 Hydrocodone Bitart


  (Norco 5/325)  1 tab  Q4H  PRN


 ORAL


 Severe Pain (Pain Scale 7-10)  7/27/19 02:30


 7/30/19 18:29   


 


 


 Amlodipine


 Besylate


  (Norvasc)  10 mg  DAILY


 ORAL


   7/27/19 09:00


 8/26/19 08:59  7/28/19 09:14


 


 


 Aspirin


  (Ecotrin)  81 mg  DAILY


 ORAL


   7/27/19 09:00


 8/23/19 08:59  7/28/19 09:13


 


 


 Atenolol


  (Tenormin)  12.5 mg  DAILY


 ORAL


   7/29/19 09:00


 8/28/19 08:59   


 


 


 Folic Acid


  (Folate)  1 mg  DAILY


 ORAL


   7/27/19 09:00


 8/23/19 08:59  7/28/19 09:13


 


 


 Lorazepam


  (Ativan)  1 mg  Q6H  PRN


 ORAL


 For Anxiety  7/27/19 02:08


 7/30/19 02:07   


 


 


 Multivitamins


  (Multivitamins)  1 tab  DAILY


 ORAL


   7/27/19 09:00


 8/23/19 08:59  7/28/19 09:13


 


 


 Quetiapine


 Fumarate


  (SEROquel)  37.5 mg  TID


 ORAL


   7/29/19 09:00


 8/28/19 08:59   


 


 


 Sodium Chloride  1,000 ml @ 


 75 mls/hr  [id number]


 IV


   7/27/19 03:00


 8/24/19 02:59  7/28/19 19:40


 


 


 Tamsulosin HCl


  (Flomax)  0.4 mg  BID


 ORAL


   7/27/19 09:00


 8/22/19 19:00  7/28/19 18:09


 


 


 Tramadol HCl


  (Ultram)  50 mg  Q6H  PRN


 ORAL


 Moderate Pain (Pain Scale 4-6)  7/27/19 06:30


 7/30/19 18:29   


 


 


 Vancomycin HCl


  (Firvanq)  125 mg  FOUR TIMES A  DAY


 ORAL


   7/27/19 09:00


 8/2/19 12:59  7/28/19 21:14


 





Laboratory Tests


7/29/19 05:35: 


White Blood Count 5.9, Red Blood Count 4.07L, Hemoglobin 12.0L, Hematocrit 36.5L

, Mean Corpuscular Volume 90, Mean Corpuscular Hemoglobin 29.4, Mean 

Corpuscular Hemoglobin Concent 32.8, Red Cell Distribution Width 13.6, Platelet 

Count 159, Mean Platelet Volume 7.1, Neutrophils (%) (Auto) 49.2, Lymphocytes (%

) (Auto) 29.0, Monocytes (%) (Auto) 10.9H, Eosinophils (%) (Auto) 9.3H, 

Basophils (%) (Auto) 1.6, Sodium Level 144, Potassium Level 3.5, Chloride Level 

113H, Carbon Dioxide Level 22, Anion Gap 9, Blood Urea Nitrogen 12, Creatinine 

1.3, Estimat Glomerular Filtration Rate 55.4, Glucose Level 103, Calcium Level 

7.9L, Magnesium Level 1.5L, Total Bilirubin 0.3, Aspartate Amino Transf (AST/

SGOT) 56H, Alanine Aminotransferase (ALT/SGPT) 56, Alkaline Phosphatase 86, 

Total Protein 6.2L, Albumin 2.4L, Globulin 3.8, Albumin/Globulin Ratio 0.6L


Height (Feet):  5


Height (Inches):  10.00


Weight (Pounds):  181


Objective


 exam stable











Israel Pink MD Jul 29, 2019 08:55

## 2019-07-29 NOTE — PROGRESS NOTE
DATE:  07/28/2019

SUBJECTIVE:  The patient is agitated today.  Pulled out his Yang catheter

due to agitation.  We recommend the patient is in restraints.  The

patient is confused and disoriented.



MENTAL STATUS EXAMINATION:  The patient is alert.  Not redirectable.  Mood

is agitated.  Affect is flat.  Thought process, there is a paucity of

thought content.  Thought content, no suicidal or homicidal ideation.



PLAN:

1. We will increase the Seroquel to 37.5 mg p.o. t.i.d.

2. Continue the restraints.

3. We will continue to follow and readjust the medication.









  ______________________________________________

  Morris Meyers M.D.





DR:  NIKKI

D:  07/28/2019 23:50

T:  07/29/2019 00:10

JOB#:  [id number]

CC:



JIHAN

## 2019-07-29 NOTE — NEPHROLOGY PROGRESS NOTE
Assessment/Plan


Problem List:  


(1) Bladder calculi


(2) Renal insufficiency


(3) Bilateral renal stones


(4) KENYETTA (acute kidney injury)


(5) Hydronephrosis


(6) Staghorn calculus


Plan


creatinine lower, 1.6, 1.3 psych consult, will need further  procedures in 

future, urine culture neg stop current antibiotic, c diff + ordered po vanco, 

colonized mrsa, pierre out, voiding





Subjective


Constitutional:  Reports: weakness


HEENT:  Reports: no symptoms


Genitourinary:  Reports: no symptoms


Neurologic/Psychiatric:  Reports: no symptoms


Subjective


, no distress-





Objective


Objective





Last 24 Hour Vital Signs








  Date Time  Temp Pulse Resp B/P (MAP) Pulse Ox O2 Delivery O2 Flow Rate FiO2


 


7/29/19 16:00 97.5 58 18 131/69 (89) 98   


 


7/29/19 12:00 97.3 62 18 143/83 (103) 98   


 


7/29/19 10:00      Room Air  


 


7/29/19 09:03  66  140/77    


 


7/29/19 09:02  66  140/77    


 


7/29/19 08:00 98.0 66 18 140/77 (98) 98   


 


7/29/19 04:00 97.7 58 18 145/79 (101) 97   


 


7/29/19 00:00 98.1 61 18 143/61 (88) 95   


 


7/28/19 21:00      Room Air  


 


7/28/19 20:00 98.2 64 18 135/80 (98) 95   

















Intake and Output  


 


 7/28/19 7/29/19





 18:59 06:59


 


Intake Total 1590 ml 1575 ml


 


Output Total 300 ml 150 ml


 


Balance 1290 ml 1425 ml


 


  


 


Intake Oral 840 ml 600 ml


 


IV Total 750 ml 975 ml


 


Output Urine Total 300 ml 150 ml


 


# Voids 2 1


 


# Bowel Movements 1 








Laboratory Tests


7/29/19 05:35: 


White Blood Count 5.9, Red Blood Count 4.07L, Hemoglobin 12.0L, Hematocrit 36.5L

, Mean Corpuscular Volume 90, Mean Corpuscular Hemoglobin 29.4, Mean 

Corpuscular Hemoglobin Concent 32.8, Red Cell Distribution Width 13.6, Platelet 

Count 159, Mean Platelet Volume 7.1, Neutrophils (%) (Auto) 49.2, Lymphocytes (%

) (Auto) 29.0, Monocytes (%) (Auto) 10.9H, Eosinophils (%) (Auto) 9.3H, 

Basophils (%) (Auto) 1.6, Sodium Level 144, Potassium Level 3.5, Chloride Level 

113H, Carbon Dioxide Level 22, Anion Gap 9, Blood Urea Nitrogen 12, Creatinine 

1.3, Estimat Glomerular Filtration Rate 55.4, Glucose Level 103, Calcium Level 

7.9L, Magnesium Level 1.5L, Total Bilirubin 0.3, Aspartate Amino Transf (AST/

SGOT) 56H, Alanine Aminotransferase (ALT/SGPT) 56, Alkaline Phosphatase 86, 

Total Protein 6.2L, Albumin 2.4L, Globulin 3.8, Albumin/Globulin Ratio 0.6L


Height (Feet):  5


Height (Inches):  10.00


Weight (Pounds):  181


General Appearance:  no apparent distress, alert


EENT:  normal ENT inspection


Neck:  normal alignment


Cardiovascular:  normal rate


Respiratory/Chest:  lungs clear


Abdomen:  non tender, soft


Neurologic:  CNs II-XII grossly normal











Manny Eldridge MD Jul 29, 2019 16:08

## 2019-07-30 VITALS — DIASTOLIC BLOOD PRESSURE: 69 MMHG | SYSTOLIC BLOOD PRESSURE: 140 MMHG

## 2019-07-30 VITALS — SYSTOLIC BLOOD PRESSURE: 130 MMHG | DIASTOLIC BLOOD PRESSURE: 61 MMHG

## 2019-07-30 VITALS — SYSTOLIC BLOOD PRESSURE: 127 MMHG | DIASTOLIC BLOOD PRESSURE: 84 MMHG

## 2019-07-30 VITALS — SYSTOLIC BLOOD PRESSURE: 134 MMHG | DIASTOLIC BLOOD PRESSURE: 72 MMHG

## 2019-07-30 VITALS — SYSTOLIC BLOOD PRESSURE: 128 MMHG | DIASTOLIC BLOOD PRESSURE: 65 MMHG

## 2019-07-30 VITALS — SYSTOLIC BLOOD PRESSURE: 139 MMHG | DIASTOLIC BLOOD PRESSURE: 75 MMHG

## 2019-07-30 RX ADMIN — VANCOMYCIN HYDROCHLORIDE SCH MG: 500 INJECTION, POWDER, LYOPHILIZED, FOR SOLUTION INTRAVENOUS at 20:29

## 2019-07-30 RX ADMIN — ASPIRIN SCH MG: 81 TABLET, DELAYED RELEASE ORAL at 09:36

## 2019-07-30 RX ADMIN — MAGNESIUM OXIDE TAB 400 MG (241.3 MG ELEMENTAL MG) SCH MG: 400 (241.3 MG) TAB at 18:01

## 2019-07-30 RX ADMIN — VANCOMYCIN HYDROCHLORIDE SCH MG: 500 INJECTION, POWDER, LYOPHILIZED, FOR SOLUTION INTRAVENOUS at 09:37

## 2019-07-30 RX ADMIN — MAGNESIUM OXIDE TAB 400 MG (241.3 MG ELEMENTAL MG) SCH MG: 400 (241.3 MG) TAB at 13:19

## 2019-07-30 RX ADMIN — ATENOLOL SCH MG: 25 TABLET ORAL at 09:36

## 2019-07-30 RX ADMIN — VANCOMYCIN HYDROCHLORIDE SCH MG: 500 INJECTION, POWDER, LYOPHILIZED, FOR SOLUTION INTRAVENOUS at 13:19

## 2019-07-30 RX ADMIN — TAMSULOSIN HYDROCHLORIDE SCH MG: 0.4 CAPSULE ORAL at 09:36

## 2019-07-30 RX ADMIN — TAMSULOSIN HYDROCHLORIDE SCH MG: 0.4 CAPSULE ORAL at 18:02

## 2019-07-30 RX ADMIN — VANCOMYCIN HYDROCHLORIDE SCH MG: 500 INJECTION, POWDER, LYOPHILIZED, FOR SOLUTION INTRAVENOUS at 18:02

## 2019-07-30 NOTE — PROGRESS NOTE
DATE:  07/29/2019

NOTE:  POOR AUDIO



SUBJECTIVE:  The patient is feeling less  disorganized and will

be continued.



MENTAL STATUS EXAMINATION:  The patient is alert and oriented times self

and place.  Poor insight into the situation he is in.  Mood is anxious.

Affect is constricted, congruent with mood.  Thought process is concrete.

Thought content, no suicidal or homicidal ideation.



ASSESSMENT:

1. Acute encephalopathy, improving.

2. Agitation.



PLAN:  We will continue current medications.  Provide the patient with

reality orientation and supportive therapy.









  ______________________________________________

  Morris Meyers M.D.





DR:  COURTNEY

D:  07/29/2019 15:33

T:  07/30/2019 03:14

JOB#:  [id number]

CC:



JIHAN

## 2019-07-30 NOTE — PROGRESS NOTE
DATE:  07/30/2019

SUBJECTIVE:  The patient is presenting with anxiety.  More cooperative and

less agitated.  However, is unable to provide any meaningful information.

He has poor impulse control.  Calmer, not in restraints.



MENTAL STATUS EXAMINATION:  Alert and oriented times self and place.  Mood

is neutral.  Affect is flat.  Thought process, there is a paucity of

thought content.  Thought content, no suicidal or homicidal ideations.

Cognition is impaired.



ASSESSMENT:  Acute encephalopathy, improving.



PLAN:

1. We will continue with the current medication.

2. Discontinue the restraints.

3. We will continue with current medication.  Provide the patient with

reality orientation and supportive therapy.









  ______________________________________________

  Morris Meyers M.D.





DR:  GAVI

D:  07/30/2019 16:10

T:  07/30/2019 22:41

JOB#:  [id number]

CC:

## 2019-07-30 NOTE — PROGRESS NOTE
DATE:  07/29/2019

CARDIOLOGY AND INTERNAL MEDICINE PROGRESS NOTE



SUBJECTIVE:  Decreased diarrhea.  No nausea or vomiting.  No abdominal

pain.



OBJECTIVE:

VITAL SIGNS:  Blood pressure 131/69, heart rate 58 to 62, respiratory rate

18, and afebrile.

LUNGS:  Clear.

CARDIAC:  Regular.

ABDOMEN:  Soft and nontender.  No CVA tenderness.

EXTREMITIES:  No edema.



LABORATORY DATA:  White count 5.9 and hemoglobin 12.  BUN 12 and creatinine

1.3.  Potassium 3.5.  Magnesium 1.5.  Albumin 2.4.



IMPRESSION:

1. Acute renal failure has resolved and creatinine seems to have reached

baseline.

2. Persistent hypomagnesemia.

3. Resolving bradycardia on lower dose of beta-blocker.

4. Nephrolithiasis.

5. C. difficile colitis.



PLAN:

1. Additional magnesium replacement and potassium replacement.

2. Maintain adequate oral intake.

3. Discontinue IV fluids.

4. Monitor renal function.

5. Outpatient lithotripsy in the future.

6. Continue oral vancomycin.









  ______________________________________________

  Peter Bolaños M.D.





DR:  AKHIL

D:  07/29/2019 21:13

T:  07/30/2019 00:48

JOB#:  [id number]

CC:

## 2019-07-30 NOTE — NEPHROLOGY PROGRESS NOTE
Assessment/Plan


Problem List:  


(1) Bladder calculi


(2) Renal insufficiency


(3) Bilateral renal stones


(4) KENYETTA (acute kidney injury)


(5) Hydronephrosis


(6) Staghorn calculus


(7) Hypomagnesemia


Plan


creatinine lower, 1.6, 1.3 psych consult, will need further  procedures in 

future, urine culture neg stop current antibiotic, c diff + ordered po vanco, 

colonized mrsa, pierre out, voiding, add magnesium oxide





Subjective


HEENT:  Reports: no symptoms


Genitourinary:  Reports: no symptoms


Neurologic/Psychiatric:  Reports: no symptoms


Subjective


, no distress-





Objective


Objective





Last 24 Hour Vital Signs








  Date Time  Temp Pulse Resp B/P (MAP) Pulse Ox O2 Delivery O2 Flow Rate FiO2


 


7/30/19 09:36  62  127/84    


 


7/30/19 09:36  62  127/84    


 


7/30/19 09:00      Room Air  


 


7/30/19 08:00 97.1 62 20 127/84 (98) 96   


 


7/30/19 04:00 98.2 92 18 140/69 (92) 92   


 


7/30/19 00:00 96.4 44 17 139/75 (96) 93   


 


7/29/19 20:04      Room Air  


 


7/29/19 20:00 97.9 67 17 134/67 (89) 98   


 


7/29/19 16:00 97.5 58 18 131/69 (89) 98   

















Intake and Output  


 


 7/29/19 7/30/19





 19:00 07:00


 


Intake Total 795 ml 1425 ml


 


Balance 795 ml 1425 ml


 


  


 


Intake Oral 120 ml 480 ml


 


IV Total 675 ml 945 ml


 


# Voids 3 6


 


# Bowel Movements 1 








Height (Feet):  5


Height (Inches):  10.00


Weight (Pounds):  181


General Appearance:  no apparent distress


EENT:  other - L lens opacity


Neck:  normal alignment


Cardiovascular:  normal rate


Respiratory/Chest:  lungs clear


Abdomen:  non tender


Extremities:  other - no edema


Neurologic:  CNs II-XII grossly normal











Manny Eldridge MD Jul 30, 2019 12:19

## 2019-07-31 VITALS — DIASTOLIC BLOOD PRESSURE: 71 MMHG | SYSTOLIC BLOOD PRESSURE: 145 MMHG

## 2019-07-31 VITALS — SYSTOLIC BLOOD PRESSURE: 128 MMHG | DIASTOLIC BLOOD PRESSURE: 64 MMHG

## 2019-07-31 VITALS — SYSTOLIC BLOOD PRESSURE: 137 MMHG | DIASTOLIC BLOOD PRESSURE: 72 MMHG

## 2019-07-31 VITALS — SYSTOLIC BLOOD PRESSURE: 143 MMHG | DIASTOLIC BLOOD PRESSURE: 75 MMHG

## 2019-07-31 VITALS — SYSTOLIC BLOOD PRESSURE: 145 MMHG | DIASTOLIC BLOOD PRESSURE: 100 MMHG

## 2019-07-31 LAB
ANION GAP SERPL CALC-SCNC: 7 MMOL/L (ref 5–15)
BUN SERPL-MCNC: 16 MG/DL (ref 7–18)
CALCIUM SERPL-MCNC: 8.3 MG/DL (ref 8.5–10.1)
CHLORIDE SERPL-SCNC: 111 MMOL/L (ref 98–107)
CO2 SERPL-SCNC: 25 MMOL/L (ref 21–32)
CREAT SERPL-MCNC: 1.3 MG/DL (ref 0.55–1.3)
POTASSIUM SERPL-SCNC: 3.7 MMOL/L (ref 3.5–5.1)
SODIUM SERPL-SCNC: 142 MMOL/L (ref 136–145)

## 2019-07-31 RX ADMIN — ATENOLOL SCH MG: 25 TABLET ORAL at 08:34

## 2019-07-31 RX ADMIN — MAGNESIUM OXIDE TAB 400 MG (241.3 MG ELEMENTAL MG) SCH MG: 400 (241.3 MG) TAB at 12:29

## 2019-07-31 RX ADMIN — ASPIRIN SCH MG: 81 TABLET, DELAYED RELEASE ORAL at 08:33

## 2019-07-31 RX ADMIN — VANCOMYCIN HYDROCHLORIDE SCH MG: 500 INJECTION, POWDER, LYOPHILIZED, FOR SOLUTION INTRAVENOUS at 12:29

## 2019-07-31 RX ADMIN — TAMSULOSIN HYDROCHLORIDE SCH MG: 0.4 CAPSULE ORAL at 08:33

## 2019-07-31 RX ADMIN — VANCOMYCIN HYDROCHLORIDE SCH MG: 500 INJECTION, POWDER, LYOPHILIZED, FOR SOLUTION INTRAVENOUS at 21:02

## 2019-07-31 RX ADMIN — MAGNESIUM OXIDE TAB 400 MG (241.3 MG ELEMENTAL MG) SCH MG: 400 (241.3 MG) TAB at 17:21

## 2019-07-31 RX ADMIN — VANCOMYCIN HYDROCHLORIDE SCH MG: 500 INJECTION, POWDER, LYOPHILIZED, FOR SOLUTION INTRAVENOUS at 17:21

## 2019-07-31 RX ADMIN — TAMSULOSIN HYDROCHLORIDE SCH MG: 0.4 CAPSULE ORAL at 17:21

## 2019-07-31 RX ADMIN — MAGNESIUM OXIDE TAB 400 MG (241.3 MG ELEMENTAL MG) SCH MG: 400 (241.3 MG) TAB at 08:33

## 2019-07-31 RX ADMIN — VANCOMYCIN HYDROCHLORIDE SCH MG: 500 INJECTION, POWDER, LYOPHILIZED, FOR SOLUTION INTRAVENOUS at 08:33

## 2019-07-31 NOTE — UROLOGY PROGRESS NOTE
Assessment/Plan


Assessment/Plan:


1. Staghorn renal calculus.


2. Bladder calculus.


3. Mild hydronephrosis, likely chronic.


4. Chronic kidney disease.


5. Acute kidney injury, improved.


6. Hematuria.


7. Pyuria.


8. Urinary retention.


9. Neurogenic bladder.


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





monitor clinically


pierre out and voiding


monitor renal fxn, stable


abx as ordered


cont flomax BID


pt can have tx of stones later electively by urologist in his plan





Subjective


Allergies:  


Coded Allergies:  


     No Known Allergies (Unverified , 7/23/19)


Subjective


all noted, voiding





Objective





Last 24 Hour Vital Signs








  Date Time  Temp Pulse Resp B/P (MAP) Pulse Ox O2 Delivery O2 Flow Rate FiO2


 


7/31/19 08:34  54  145/100    


 


7/31/19 08:33  54  145/100    


 


7/31/19 08:00 98.1 54 18 145/100 (115) 95   


 


7/31/19 04:00 98.0 58 18 128/64 (85) 93   


 


7/30/19 20:05      Room Air  


 


7/30/19 19:49 97.5 56 16 130/61 (84) 97   


 


7/30/19 16:00 96.8 59 20 128/65 (86) 96   


 


7/30/19 12:00 97.1 52 20 134/72 (92) 93   


 


7/30/19 09:36  62  127/84    


 


7/30/19 09:36  62  127/84    


 


7/30/19 09:00      Room Air  

















Intake and Output  


 


 7/30/19 7/31/19





 19:00 07:00


 


Intake Total 1755 ml 930 ml


 


Balance 1755 ml 930 ml


 


  


 


Intake Oral 930 ml 480 ml


 


IV Total 825 ml 450 ml


 


# Voids 3 5


 


# Bowel Movements 2 











Microbiology








 Date/Time


Source Procedure


Growth Status


 


 


 7/23/19 14:50


Nasal Nares MRSA Culture - Final


Staphylococcus Aureus - Mrsa Complete


 


 7/25/19 04:45


Stool Clostridium difficile Toxin Assay - Final Complete


 


 7/23/19 14:16


Urine,Clean Catch Urine Culture - Final


NO GROWTH AFTER 48 HOURS Complete


 


 7/23/19 14:50


Rectum VRE Culture - Final


NO VANCOMYCIN RESISTANT ENTEROCOCCUS ... Complete








Current Medications








 Medications


  (Trade)  Dose


 Ordered  Sig/Lul


 Route


 PRN Reason  Start Time


 Stop Time Status Last Admin


Dose Admin


 


 Amlodipine


 Besylate


  (Norvasc)  10 mg  DAILY


 ORAL


   7/27/19 09:00


 8/26/19 08:59  7/31/19 08:33


 


 


 Aspirin


  (Ecotrin)  81 mg  DAILY


 ORAL


   7/27/19 09:00


 8/23/19 08:59  7/31/19 08:33


 


 


 Atenolol


  (Tenormin)  12.5 mg  DAILY


 ORAL


   7/29/19 09:00


 8/28/19 08:59  7/30/19 09:36


 


 


 Folic Acid


  (Folate)  1 mg  DAILY


 ORAL


   7/27/19 09:00


 8/23/19 08:59  7/31/19 08:33


 


 


 Magnesium Oxide


  (Mag-Ox 400mg)  400 mg  THREE TIMES A  DAY


 ORAL


   7/30/19 13:00


 8/29/19 12:59  7/31/19 08:33


 


 


 Multivitamins


  (Multivitamins)  1 tab  DAILY


 ORAL


   7/27/19 09:00


 8/23/19 08:59  7/31/19 08:33


 


 


 Quetiapine


 Fumarate


  (SEROquel)  37.5 mg  TID


 ORAL


   7/29/19 09:00


 8/28/19 08:59  7/31/19 08:34


 


 


 Tamsulosin HCl


  (Flomax)  0.4 mg  BID


 ORAL


   7/27/19 09:00


 8/22/19 19:00  7/31/19 08:33


 


 


 Vancomycin HCl


  (Firvanq)  125 mg  FOUR TIMES A  DAY


 ORAL


   7/27/19 09:00


 8/2/19 12:59  7/31/19 08:33


 





Laboratory Tests


7/31/19 05:20: 


Sodium Level 142, Potassium Level 3.7, Chloride Level 111H, Carbon Dioxide 

Level 25, Anion Gap 7, Blood Urea Nitrogen 16, Creatinine 1.3, Estimat 

Glomerular Filtration Rate 55.4, Glucose Level 91, Calcium Level 8.3L


Height (Feet):  5


Height (Inches):  10.00


Weight (Pounds):  184


Objective


 exam stable











Israel Pink MD Jul 31, 2019 08:41

## 2019-07-31 NOTE — NEPHROLOGY PROGRESS NOTE
Assessment/Plan


Problem List:  


(1) Bladder calculi


(2) Renal insufficiency


(3) Bilateral renal stones


(4) KENYETTA (acute kidney injury)


(5) Hydronephrosis


(6) Staghorn calculus


(7) Hypomagnesemia


Plan


creatinine lower, 1.6, 1.3 psych consult, will need further  procedures in 

future, urine culture neg stop current antibiotic, c diff + ordered po vanco, 

colonized mrsa, pierre out, voiding, add magnesium oxide





Subjective


Constitutional:  Reports: weakness


HEENT:  Reports: no symptoms


Genitourinary:  Reports: incontinence


Neurologic/Psychiatric:  Reports: pre-existing deficit


Subjective


, no distress-





Objective


Objective





Last 24 Hour Vital Signs








  Date Time  Temp Pulse Resp B/P (MAP) Pulse Ox O2 Delivery O2 Flow Rate FiO2


 


7/31/19 16:00 98.5 65 20 143/75 (97) 99   


 


7/31/19 11:58 98.2 56 19 145/71 (95) 95   


 


7/31/19 09:00      Room Air  


 


7/31/19 08:34  54  145/100    


 


7/31/19 08:33  54  145/100    


 


7/31/19 08:00 98.1 54 18 145/100 (115) 95   


 


7/31/19 04:00 98.0 58 18 128/64 (85) 93   


 


7/30/19 20:05      Room Air  


 


7/30/19 19:49 97.5 56 16 130/61 (84) 97   

















Intake and Output  


 


 7/30/19 7/31/19





 19:00 07:00


 


Intake Total 1755 ml 930 ml


 


Balance 1755 ml 930 ml


 


  


 


Intake Oral 930 ml 480 ml


 


IV Total 825 ml 450 ml


 


# Voids 3 5


 


# Bowel Movements 2 








Laboratory Tests


7/31/19 05:20: 


Sodium Level 142, Potassium Level 3.7, Chloride Level 111H, Carbon Dioxide 

Level 25, Anion Gap 7, Blood Urea Nitrogen 16, Creatinine 1.3, Estimat 

Glomerular Filtration Rate 55.4, Glucose Level 91, Calcium Level 8.3L


Height (Feet):  5


Height (Inches):  10.00


Weight (Pounds):  184


General Appearance:  no apparent distress, alert


EENT:  other - L lens opacity


Cardiovascular:  normal rate, regular rhythm


Respiratory/Chest:  lungs clear, normal breath sounds


Abdomen:  non tender, soft


Neurologic:  CNs II-XII grossly normal











Manny Eldridge MD 31, 2019 16:52

## 2019-07-31 NOTE — PROGRESS NOTE
DATE:  07/30/2019

INTERNAL MEDICINE PROGRESS NOTE



SUBJECTIVE:  The patient is without new complaints.  He remains on IV

fluids and magnesium replacement.  Minimal diarrhea noted.



OBJECTIVE:  Vitals are stable.  Exam is without change.



PLAN:

1. To discontinue IV fluids.

2. Continue treatment for C. difficile colitis.

3. Monitor renal parameters.

4. Discharge planning in progress.









  ______________________________________________

  Peter Bolaños M.D.





DR:  FREDI

D:  07/31/2019 01:24

T:  07/31/2019 14:06

JOB#:  [id number]

CC:

## 2019-08-01 VITALS — SYSTOLIC BLOOD PRESSURE: 148 MMHG | DIASTOLIC BLOOD PRESSURE: 83 MMHG

## 2019-08-01 VITALS — DIASTOLIC BLOOD PRESSURE: 77 MMHG | SYSTOLIC BLOOD PRESSURE: 136 MMHG

## 2019-08-01 VITALS — DIASTOLIC BLOOD PRESSURE: 72 MMHG | SYSTOLIC BLOOD PRESSURE: 129 MMHG

## 2019-08-01 VITALS — SYSTOLIC BLOOD PRESSURE: 137 MMHG | DIASTOLIC BLOOD PRESSURE: 69 MMHG

## 2019-08-01 VITALS — DIASTOLIC BLOOD PRESSURE: 69 MMHG | SYSTOLIC BLOOD PRESSURE: 148 MMHG

## 2019-08-01 VITALS — SYSTOLIC BLOOD PRESSURE: 139 MMHG | DIASTOLIC BLOOD PRESSURE: 76 MMHG

## 2019-08-01 RX ADMIN — VANCOMYCIN HYDROCHLORIDE SCH MG: 500 INJECTION, POWDER, LYOPHILIZED, FOR SOLUTION INTRAVENOUS at 08:55

## 2019-08-01 RX ADMIN — ASPIRIN SCH MG: 81 TABLET, DELAYED RELEASE ORAL at 08:54

## 2019-08-01 RX ADMIN — TAMSULOSIN HYDROCHLORIDE SCH MG: 0.4 CAPSULE ORAL at 17:08

## 2019-08-01 RX ADMIN — VANCOMYCIN HYDROCHLORIDE SCH MG: 500 INJECTION, POWDER, LYOPHILIZED, FOR SOLUTION INTRAVENOUS at 12:16

## 2019-08-01 RX ADMIN — TAMSULOSIN HYDROCHLORIDE SCH MG: 0.4 CAPSULE ORAL at 08:54

## 2019-08-01 RX ADMIN — VANCOMYCIN HYDROCHLORIDE SCH MG: 500 INJECTION, POWDER, LYOPHILIZED, FOR SOLUTION INTRAVENOUS at 20:17

## 2019-08-01 RX ADMIN — MAGNESIUM OXIDE TAB 400 MG (241.3 MG ELEMENTAL MG) SCH MG: 400 (241.3 MG) TAB at 17:08

## 2019-08-01 RX ADMIN — MAGNESIUM OXIDE TAB 400 MG (241.3 MG ELEMENTAL MG) SCH MG: 400 (241.3 MG) TAB at 08:54

## 2019-08-01 RX ADMIN — MAGNESIUM OXIDE TAB 400 MG (241.3 MG ELEMENTAL MG) SCH MG: 400 (241.3 MG) TAB at 12:16

## 2019-08-01 RX ADMIN — VANCOMYCIN HYDROCHLORIDE SCH MG: 500 INJECTION, POWDER, LYOPHILIZED, FOR SOLUTION INTRAVENOUS at 17:08

## 2019-08-01 NOTE — PROGRESS NOTE
DATE:  07/31/2019

INTERNAL MEDICINE PROGRESS NOTE



SUBJECTIVE:  No new complaints.  Off IV fluids.  Voiding without a

catheter.



OBJECTIVE:

VITAL SIGNS:  Blood pressure 143/75, pulse 65, and respiratory rate 20.

LUNGS:  Clear.

CARDIAC:  Regular.

ABDOMEN:  Soft.

EXTREMITIES:  No edema.



IMPRESSION:

1. Nephrolithiasis, resolved.

2. Renal failure.

3. Bradycardia, on beta-blocker.

4. Hypertensive heart disease.

5. Hypomagnesemia.

6. C. difficile.



PLAN:

1. Antimicrobials.

2. Maintain adequate oral intake.

3. Discontinue beta-blocker.

4. Titrate antihypertensives.

5. Discharge planning in progress.









  ______________________________________________

  Peter Bolaños M.D.





DR:  STEVEN

D:  08/01/2019 02:07

T:  08/01/2019 03:02

JOB#:  [id number]

CC:

## 2019-08-01 NOTE — UROLOGY PROGRESS NOTE
Assessment/Plan


Assessment/Plan:


1. Staghorn renal calculus.


2. Bladder calculus.


3. Mild hydronephrosis, likely chronic.


4. Chronic kidney disease.


5. Acute kidney injury, improved.


6. Hematuria.


7. Pyuria.


8. Urinary retention.


9. Neurogenic bladder.


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





monitor clinically


pierre out and voiding


monitor renal fxn, stable


abx as ordered


cont flomax BID


pt can have tx of stones later electively by urologist in his plan





Subjective


Allergies:  


Coded Allergies:  


     No Known Allergies (Unverified , 7/23/19)


Subjective


all noted, voiding





Objective





Last 24 Hour Vital Signs








  Date Time  Temp Pulse Resp B/P (MAP) Pulse Ox O2 Delivery O2 Flow Rate FiO2


 


8/1/19 04:00 97.2 58 19 148/83 (104) 95   


 


8/1/19 00:00 98.0 68 18 139/76 (97) 94   


 


7/31/19 21:00      Room Air  


 


7/31/19 20:00 98.4 66 19 137/72 (93) 95   


 


7/31/19 16:00 98.5 65 20 143/75 (97) 99   


 


7/31/19 11:58 98.2 56 19 145/71 (95) 95   


 


7/31/19 09:00      Room Air  

















Intake and Output  


 


 7/31/19 8/1/19





 19:00 07:00


 


Intake Total 956 ml 


 


Balance 956 ml 


 


  


 


Intake Oral 956 ml 


 


# Voids 5 2


 


# Bowel Movements 1 











Microbiology








 Date/Time


Source Procedure


Growth Status


 


 


 7/23/19 14:50


Nasal Nares MRSA Culture - Final


Staphylococcus Aureus - Mrsa Complete


 


 7/25/19 04:45


Stool Clostridium difficile Toxin Assay - Final Complete


 


 7/23/19 14:16


Urine,Clean Catch Urine Culture - Final


NO GROWTH AFTER 48 HOURS Complete


 


 7/23/19 14:50


Rectum VRE Culture - Final


NO VANCOMYCIN RESISTANT ENTEROCOCCUS ... Complete








Current Medications








 Medications


  (Trade)  Dose


 Ordered  Sig/Lul


 Route


 PRN Reason  Start Time


 Stop Time Status Last Admin


Dose Admin


 


 Amlodipine


 Besylate


  (Norvasc)  10 mg  DAILY


 ORAL


   7/27/19 09:00


 8/26/19 08:59  7/31/19 08:33


 


 


 Aspirin


  (Ecotrin)  81 mg  DAILY


 ORAL


   7/27/19 09:00


 8/23/19 08:59  7/31/19 08:33


 


 


 Folic Acid


  (Folate)  1 mg  DAILY


 ORAL


   7/27/19 09:00


 8/23/19 08:59  7/31/19 08:33


 


 


 Magnesium Oxide


  (Mag-Ox 400mg)  400 mg  THREE TIMES A  DAY


 ORAL


   7/30/19 13:00


 8/29/19 12:59  7/31/19 17:21


 


 


 Multivitamins


  (Multivitamins)  1 tab  DAILY


 ORAL


   7/27/19 09:00


 8/23/19 08:59  7/31/19 08:33


 


 


 Quetiapine


 Fumarate


  (SEROquel)  37.5 mg  TID


 ORAL


   7/29/19 09:00


 8/28/19 08:59  7/31/19 18:29


 


 


 Tamsulosin HCl


  (Flomax)  0.4 mg  BID


 ORAL


   7/27/19 09:00


 8/22/19 19:00  7/31/19 17:21


 


 


 Vancomycin HCl


  (Firvanq)  125 mg  FOUR TIMES A  DAY


 ORAL


   7/27/19 09:00


 8/2/19 12:59  7/31/19 21:02


 








Height (Feet):  5


Height (Inches):  10.00


Weight (Pounds):  184


Objective


 exam stable











Israel Pink MD Aug 1, 2019 08:41

## 2019-08-01 NOTE — PROGRESS NOTE
DATE:  07/31/2019

SUBJECTIVE:  The patient is in bed.  Doing better.  Continues to be having

episodes of agitation and speaking gibberish.  Memory is impaired.



MENTAL STATUS EXAMINATION:  The patient is alert, confused, and disoriented

to date.  Mood is anxious.  Affect is flat.  Thought process, there is a

paucity of thought content.  Thought content, no suicidal or homicidal

ideations.



ASSESSMENT:

1. Alcohol dependence.

2. Anxiety.



PLAN:  We will continue with current medications.  Provide the patient with

reality orientation and supportive therapy.









  ______________________________________________

  Morris Meyers M.D. DR:  REJI

D:  07/31/2019 22:54

T:  08/01/2019 02:57

JOB#:  [id number]

CC:

## 2019-08-02 VITALS — DIASTOLIC BLOOD PRESSURE: 58 MMHG | SYSTOLIC BLOOD PRESSURE: 117 MMHG

## 2019-08-02 VITALS — SYSTOLIC BLOOD PRESSURE: 142 MMHG | DIASTOLIC BLOOD PRESSURE: 75 MMHG

## 2019-08-02 VITALS — SYSTOLIC BLOOD PRESSURE: 130 MMHG | DIASTOLIC BLOOD PRESSURE: 69 MMHG

## 2019-08-02 VITALS — DIASTOLIC BLOOD PRESSURE: 73 MMHG | SYSTOLIC BLOOD PRESSURE: 135 MMHG

## 2019-08-02 VITALS — SYSTOLIC BLOOD PRESSURE: 131 MMHG | DIASTOLIC BLOOD PRESSURE: 61 MMHG

## 2019-08-02 VITALS — DIASTOLIC BLOOD PRESSURE: 72 MMHG | SYSTOLIC BLOOD PRESSURE: 123 MMHG

## 2019-08-02 RX ADMIN — MAGNESIUM OXIDE TAB 400 MG (241.3 MG ELEMENTAL MG) SCH MG: 400 (241.3 MG) TAB at 17:13

## 2019-08-02 RX ADMIN — MAGNESIUM OXIDE TAB 400 MG (241.3 MG ELEMENTAL MG) SCH MG: 400 (241.3 MG) TAB at 08:25

## 2019-08-02 RX ADMIN — TAMSULOSIN HYDROCHLORIDE SCH MG: 0.4 CAPSULE ORAL at 17:13

## 2019-08-02 RX ADMIN — VANCOMYCIN HYDROCHLORIDE SCH MG: 500 INJECTION, POWDER, LYOPHILIZED, FOR SOLUTION INTRAVENOUS at 10:35

## 2019-08-02 RX ADMIN — MAGNESIUM OXIDE TAB 400 MG (241.3 MG ELEMENTAL MG) SCH MG: 400 (241.3 MG) TAB at 13:48

## 2019-08-02 RX ADMIN — VANCOMYCIN HYDROCHLORIDE SCH MG: 500 INJECTION, POWDER, LYOPHILIZED, FOR SOLUTION INTRAVENOUS at 20:42

## 2019-08-02 RX ADMIN — TAMSULOSIN HYDROCHLORIDE SCH MG: 0.4 CAPSULE ORAL at 08:25

## 2019-08-02 RX ADMIN — VANCOMYCIN HYDROCHLORIDE SCH MG: 500 INJECTION, POWDER, LYOPHILIZED, FOR SOLUTION INTRAVENOUS at 13:48

## 2019-08-02 RX ADMIN — VANCOMYCIN HYDROCHLORIDE SCH MG: 500 INJECTION, POWDER, LYOPHILIZED, FOR SOLUTION INTRAVENOUS at 17:13

## 2019-08-02 RX ADMIN — ASPIRIN SCH MG: 81 TABLET, DELAYED RELEASE ORAL at 08:25

## 2019-08-02 RX ADMIN — Medication SCH MG: at 08:25

## 2019-08-02 NOTE — UROLOGY PROGRESS NOTE
Assessment/Plan


Assessment/Plan:


1. Staghorn renal calculus.


2. Bladder calculus.


3. Mild hydronephrosis, likely chronic.


4. Chronic kidney disease.


5. Acute kidney injury, improved.


6. Hematuria.


7. Pyuria.


8. Urinary retention.


9. Neurogenic bladder.


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





monitor clinically


pierre out and voiding


monitor renal fxn, stable


abx as ordered


cont flomax BID


pt can have tx of stones later electively by urologist in his plan





Subjective


Allergies:  


Coded Allergies:  


     No Known Allergies (Unverified , 7/23/19)


Subjective


all noted, voiding





Objective





Last 24 Hour Vital Signs








  Date Time  Temp Pulse Resp B/P (MAP) Pulse Ox O2 Delivery O2 Flow Rate FiO2


 


8/2/19 08:25  63  123/72    


 


8/2/19 04:00 98.6 63 20 123/72 (89) 98   


 


8/2/19 00:00 98.9 63 18 117/58 (77) 97   


 


8/1/19 21:00      Room Air  


 


8/1/19 20:00 98.0 74 18 148/69 (95) 97   


 


8/1/19 15:55 97.6 70 18 137/69 (91) 97   


 


8/1/19 12:00 98.0 63 18 136/77 (96) 96   


 


8/1/19 09:00      Room Air  


 


8/1/19 08:54  69  129/72    

















Intake and Output  


 


 8/1/19 8/2/19





 19:00 07:00


 


Intake Total 960 ml 


 


Output Total  300 ml


 


Balance 960 ml -300 ml


 


  


 


Intake Oral 960 ml 


 


Output Urine Total  300 ml


 


# Voids 5 2


 


# Bowel Movements 4 











Microbiology








 Date/Time


Source Procedure


Growth Status


 


 


 7/23/19 14:50


Nasal Nares MRSA Culture - Final


Staphylococcus Aureus - Mrsa Complete


 


 7/25/19 04:45


Stool Clostridium difficile Toxin Assay - Final Complete


 


 7/23/19 14:16


Urine,Clean Catch Urine Culture - Final


NO GROWTH AFTER 48 HOURS Complete


 


 7/23/19 14:50


Rectum VRE Culture - Final


NO VANCOMYCIN RESISTANT ENTEROCOCCUS ... Complete








Current Medications








 Medications


  (Trade)  Dose


 Ordered  Sig/Lul


 Route


 PRN Reason  Start Time


 Stop Time Status Last Admin


Dose Admin


 


 Amlodipine


 Besylate


  (Norvasc)  10 mg  DAILY


 ORAL


   7/27/19 09:00


 8/26/19 08:59  8/2/19 08:25


 


 


 Aspirin


  (Ecotrin)  81 mg  DAILY


 ORAL


   7/27/19 09:00


 8/23/19 08:59  8/2/19 08:25


 


 


 Escitalopram


 Oxalate


  (Lexapro)  10 mg  DAILY


 ORAL


   8/2/19 09:00


 9/1/19 08:59  8/2/19 08:25


 


 


 Folic Acid


  (Folate)  1 mg  DAILY


 ORAL


   7/27/19 09:00


 8/23/19 08:59  8/2/19 08:25


 


 


 Magnesium Oxide


  (Mag-Ox 400mg)  400 mg  THREE TIMES A  DAY


 ORAL


   7/30/19 13:00


 8/29/19 12:59  8/2/19 08:25


 


 


 Magnesium Sulfate  100 ml @ 


 100 mls/hr  Q1H


 IVPB


   8/2/19 09:00


 8/2/19 10:59  8/2/19 08:26


 


 


 Multivitamins


  (Multivitamins)  1 tab  DAILY


 ORAL


   7/27/19 09:00


 8/23/19 08:59  8/2/19 08:25


 


 


 Quetiapine


 Fumarate


  (SEROquel)  37.5 mg  TID


 ORAL


   7/29/19 09:00


 8/28/19 08:59  8/2/19 08:25


 


 


 Tamsulosin HCl


  (Flomax)  0.4 mg  BID


 ORAL


   7/27/19 09:00


 8/22/19 19:00  8/2/19 08:25


 


 


 Thiamine HCl


  (Vitamin B1)  100 mg  DAILY


 ORAL


   8/2/19 09:00


 9/1/19 08:59  8/2/19 08:25


 


 


 Vancomycin HCl


  (Firvanq)  125 mg  FOUR TIMES A  DAY


 ORAL


   8/2/19 09:00


 8/2/19 23:00   


 








Height (Feet):  5


Height (Inches):  10.00


Weight (Pounds):  184


Objective


 exam stable











Israel Pink MD Aug 2, 2019 08:40

## 2019-08-02 NOTE — PROGRESS NOTE
DATE:  08/01/2019

INTERNAL MEDICINE PROGRESS NOTE



SUBJECTIVE:  The patient has no diarrhea.  No new complaints.  He is

voiding independently.  There is no sign of withdrawal.  There were

episodes of agitation.



OBJECTIVE:

VITAL SIGNS:  Blood pressure 137/69, pulse 70, respirations 18.

LUNGS:  Clear.

CARDIAC:  Regular.

ABDOMEN:  Soft.

EXTREMITIES:  No edema.



IMPRESSION:  Left eye anisocoria.



PLAN:

1. Complete _____ treatment tomorrow.

2. Add thiamine and vitamin.

3. Withdrawal precautions.

4. Titrate psychotropic therapy.

5. Discharge plan.

6. Outpatient lithotripsy in future.









  ______________________________________________

  Peter Bolaños M.D.





DR:  MORGAN

D:  08/02/2019 00:27

T:  08/02/2019 00:52

JOB#:  [id number]

CC:

## 2019-08-02 NOTE — NEPHROLOGY PROGRESS NOTE
Assessment/Plan


Problem List:  


(1) Bladder calculi


(2) Renal insufficiency


(3) Bilateral renal stones


(4) KENYETTA (acute kidney injury)


(5) Hydronephrosis


(6) Staghorn calculus


(7) Hypomagnesemia


Plan


creatinine lower, 1.6, 1.3 psych consult, will need further  procedures in 

future, urine culture neg stop current antibiotic, c diff + ordered po vanco, 

colonized mrsa, pierre out, voiding, add magnesium oxide





Subjective


Constitutional:  Reports: no symptoms


HEENT:  Reports: no symptoms


Genitourinary:  Reports: incontinence


Neurologic/Psychiatric:  Reports: no symptoms


Subjective


, no distress-





Objective


Objective





Last 24 Hour Vital Signs








  Date Time  Temp Pulse Resp B/P (MAP) Pulse Ox O2 Delivery O2 Flow Rate FiO2


 


8/2/19 04:00 98.6 63 20 123/72 (89) 98   


 


8/2/19 00:00 98.9 63 18 117/58 (77) 97   


 


8/1/19 21:00      Room Air  


 


8/1/19 20:00 98.0 74 18 148/69 (95) 97   


 


8/1/19 15:55 97.6 70 18 137/69 (91) 97   


 


8/1/19 12:00 98.0 63 18 136/77 (96) 96   


 


8/1/19 09:00      Room Air  


 


8/1/19 08:54  69  129/72    


 


8/1/19 08:00 97.7 69 19 129/72 (91) 96   

















Intake and Output  


 


 8/1/19 8/2/19





 19:00 07:00


 


Intake Total 960 ml 


 


Output Total  300 ml


 


Balance 960 ml -300 ml


 


  


 


Intake Oral 960 ml 


 


Output Urine Total  300 ml


 


# Voids 5 2


 


# Bowel Movements 4 








Height (Feet):  5


Height (Inches):  10.00


Weight (Pounds):  184


General Appearance:  no apparent distress


EENT:  other - L lens opacity


Cardiovascular:  regular rhythm


Respiratory/Chest:  lungs clear


Abdomen:  normal bowel sounds, non tender


Neurologic:  CNs II-XII grossly normal











Manny Eldridge MD Aug 2, 2019 07:37

## 2019-08-02 NOTE — PROGRESS NOTE
DATE:  08/01/2019

SUBJECTIVE:  The patient is in bed, no acute distress.  Calm and

cooperative.  Has episodes of anxiety.  The patient is calm, cooperative

and does not cooperate with the treatment.  Refused PT today.



MENTAL STATUS EXAMINATION:  The patient is alert, oriented to self, place,

did not know the date.  Mood is irritable and anxious.  Affect is flat.

Thought process concrete.  Thought content, no suicidal or homicidal

ideation.



ASSESSMENT:  Acute encephalopathy, alcohol dependence, mood

disorder.



PLAN:

1. We will continue the Seroquel 37.5 t.i.d.

2. Start the patient on Lexapro 10 mg in the morning.

3. We will continue to follow and readjust the medications.









  ______________________________________________

  Morris Meyers M.D.





DR:  PRATIMA

D:  08/01/2019 22:35

T:  08/02/2019 00:20

JOB#:  [id number]

CC:

## 2019-08-03 VITALS — DIASTOLIC BLOOD PRESSURE: 92 MMHG | SYSTOLIC BLOOD PRESSURE: 157 MMHG

## 2019-08-03 VITALS — DIASTOLIC BLOOD PRESSURE: 73 MMHG | SYSTOLIC BLOOD PRESSURE: 125 MMHG

## 2019-08-03 VITALS — DIASTOLIC BLOOD PRESSURE: 77 MMHG | SYSTOLIC BLOOD PRESSURE: 127 MMHG

## 2019-08-03 VITALS — DIASTOLIC BLOOD PRESSURE: 70 MMHG | SYSTOLIC BLOOD PRESSURE: 141 MMHG

## 2019-08-03 VITALS — SYSTOLIC BLOOD PRESSURE: 152 MMHG | DIASTOLIC BLOOD PRESSURE: 76 MMHG

## 2019-08-03 VITALS — SYSTOLIC BLOOD PRESSURE: 121 MMHG | DIASTOLIC BLOOD PRESSURE: 73 MMHG

## 2019-08-03 VITALS — DIASTOLIC BLOOD PRESSURE: 73 MMHG | SYSTOLIC BLOOD PRESSURE: 130 MMHG

## 2019-08-03 RX ADMIN — ASPIRIN SCH MG: 81 TABLET, DELAYED RELEASE ORAL at 08:27

## 2019-08-03 RX ADMIN — Medication SCH MG: at 08:28

## 2019-08-03 RX ADMIN — TAMSULOSIN HYDROCHLORIDE SCH MG: 0.4 CAPSULE ORAL at 08:27

## 2019-08-03 RX ADMIN — MAGNESIUM OXIDE TAB 400 MG (241.3 MG ELEMENTAL MG) SCH MG: 400 (241.3 MG) TAB at 13:35

## 2019-08-03 RX ADMIN — MAGNESIUM OXIDE TAB 400 MG (241.3 MG ELEMENTAL MG) SCH MG: 400 (241.3 MG) TAB at 17:07

## 2019-08-03 RX ADMIN — TAMSULOSIN HYDROCHLORIDE SCH MG: 0.4 CAPSULE ORAL at 17:07

## 2019-08-03 RX ADMIN — TRAMADOL HYDROCHLORIDE PRN MG: 50 TABLET, FILM COATED ORAL at 08:40

## 2019-08-03 RX ADMIN — MAGNESIUM OXIDE TAB 400 MG (241.3 MG ELEMENTAL MG) SCH MG: 400 (241.3 MG) TAB at 08:28

## 2019-08-03 RX ADMIN — TRAMADOL HYDROCHLORIDE PRN MG: 50 TABLET, FILM COATED ORAL at 16:20

## 2019-08-03 NOTE — NEPHROLOGY PROGRESS NOTE
Assessment/Plan


Problem List:  


(1) Bladder calculi


(2) Renal insufficiency


(3) Bilateral renal stones


(4) KENYETTA (acute kidney injury)


(5) Hydronephrosis


(6) Staghorn calculus


(7) Hypomagnesemia


Plan


creatinine lower, 1.6, 1.3 psych consult, will need further  procedures in 

future, urine culture neg stop current antibiotic, c diff + ordered po vanco, 

colonized mrsa, pierre out, voiding, add magnesium oxide





Subjective


Constitutional:  Reports: no symptoms


HEENT:  Reports: no symptoms


Genitourinary:  Reports: incontinence


Neurologic/Psychiatric:  Reports: no symptoms


Subjective


, no distress-





Objective


Objective





Last 24 Hour Vital Signs








  Date Time  Temp Pulse Resp B/P (MAP) Pulse Ox O2 Delivery O2 Flow Rate FiO2


 


8/3/19 12:00 98.1 81 20 125/73 (90) 96   


 


8/3/19 09:00      Room Air  


 


8/3/19 08:28  61  141/70    


 


8/3/19 08:00 97.9 61 19 141/70 (93) 96   


 


8/3/19 04:00 98.4 67 19 157/92 (113) 96   


 


8/3/19 00:00 98.5 56 19 152/76 (101) 98   


 


8/2/19 21:00      Room Air  


 


8/2/19 20:00 97.7 57 19 142/75 (97) 97   


 


8/2/19 15:54 98.7 81 18 130/69 (89) 98   

















Intake and Output  


 


 8/2/19 8/3/19





 19:00 07:00


 


Intake Total 600 ml 480 ml


 


Balance 600 ml 480 ml


 


  


 


Intake Oral 600 ml 480 ml


 


# Voids 6 2








Height (Feet):  5


Height (Inches):  10.00


Weight (Pounds):  184


General Appearance:  no apparent distress


EENT:  normal ENT inspection


Neck:  normal alignment


Cardiovascular:  normal rate, regular rhythm


Respiratory/Chest:  lungs clear


Abdomen:  non tender


Neurologic:  CNs II-XII grossly normal











Manny Eldridge MD Aug 3, 2019 13:35

## 2019-08-03 NOTE — UROLOGY PROGRESS NOTE
Assessment/Plan


Assessment/Plan:


1. Staghorn renal calculus.


2. Bladder calculus.


3. Mild hydronephrosis, likely chronic.


4. Chronic kidney disease.


5. Acute kidney injury, improved.


6. Hematuria.


7. Pyuria.


8. Urinary retention.


9. Neurogenic bladder.


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





monitor clinically


pierre out and voiding


monitor renal fxn, stable


abx as ordered


cont flomax BID


pt can have tx of stones later electively by urologist in his plan





Subjective


Allergies:  


Coded Allergies:  


     No Known Allergies (Unverified , 7/23/19)


Subjective


all noted, voiding





Objective





Last 24 Hour Vital Signs








  Date Time  Temp Pulse Resp B/P (MAP) Pulse Ox O2 Delivery O2 Flow Rate FiO2


 


8/3/19 08:28  61  141/70    


 


8/3/19 08:00 97.9 61 19 141/70 (93) 96   


 


8/3/19 04:00 98.4 67 19 157/92 (113) 96   


 


8/3/19 00:00 98.5 56 19 152/76 (101) 98   


 


8/2/19 21:00      Room Air  


 


8/2/19 20:00 97.7 57 19 142/75 (97) 97   


 


8/2/19 15:54 98.7 81 18 130/69 (89) 98   


 


8/2/19 12:00 98.4 65 18 131/61 (84) 99   

















Intake and Output  


 


 8/2/19 8/3/19





 19:00 07:00


 


Intake Total 600 ml 480 ml


 


Balance 600 ml 480 ml


 


  


 


Intake Oral 600 ml 480 ml


 


# Voids 6 2











Microbiology








 Date/Time


Source Procedure


Growth Status


 


 


 7/23/19 14:50


Nasal Nares MRSA Culture - Final


Staphylococcus Aureus - Mrsa Complete


 


 7/25/19 04:45


Stool Clostridium difficile Toxin Assay - Final Complete


 


 7/23/19 14:16


Urine,Clean Catch Urine Culture - Final


NO GROWTH AFTER 48 HOURS Complete


 


 7/23/19 14:50


Rectum VRE Culture - Final


NO VANCOMYCIN RESISTANT ENTEROCOCCUS ... Complete








Current Medications








 Medications


  (Trade)  Dose


 Ordered  Sig/Lul


 Route


 PRN Reason  Start Time


 Stop Time Status Last Admin


Dose Admin


 


 Amlodipine


 Besylate


  (Norvasc)  10 mg  DAILY


 ORAL


   7/27/19 09:00


 8/26/19 08:59  8/3/19 08:28


 


 


 Aspirin


  (Ecotrin)  81 mg  DAILY


 ORAL


   7/27/19 09:00


 8/23/19 08:59  8/3/19 08:27


 


 


 Escitalopram


 Oxalate


  (Lexapro)  10 mg  DAILY


 ORAL


   8/2/19 09:00


 9/1/19 08:59  8/3/19 08:28


 


 


 Folic Acid


  (Folate)  1 mg  DAILY


 ORAL


   7/27/19 09:00


 8/23/19 08:59  8/3/19 08:28


 


 


 Magnesium Oxide


  (Mag-Ox 400mg)  400 mg  THREE TIMES A  DAY


 ORAL


   7/30/19 13:00


 8/29/19 12:59  8/3/19 08:28


 


 


 Multivitamins


  (Multivitamins)  1 tab  DAILY


 ORAL


   7/27/19 09:00


 8/23/19 08:59  8/3/19 08:28


 


 


 Risperidone


  (RisperDAL)  2 mg  BEDTIME


 ORAL


   8/3/19 21:00


 9/2/19 20:59   


 


 


 Tamsulosin HCl


  (Flomax)  0.4 mg  BID


 ORAL


   7/27/19 09:00


 8/22/19 19:00  8/3/19 08:27


 


 


 Thiamine HCl


  (Vitamin B1)  100 mg  DAILY


 ORAL


   8/2/19 09:00


 9/1/19 08:59  8/3/19 08:28


 


 


 Tramadol HCl


  (Ultram)  50 mg  Q6H  PRN


 ORAL


 For Pain  8/3/19 08:30


 8/10/19 08:29  8/3/19 08:40


 








Height (Feet):  5


Height (Inches):  10.00


Weight (Pounds):  184


Objective


 exam stable











Israel Pink MD Aug 3, 2019 09:34

## 2019-08-03 NOTE — PROGRESS NOTE
DATE:  08/02/2019

SUBJECTIVE:  The patient is still uncooperative.  He was defecating in the

bed.  Poor insight.  Not redirectable.



MENTAL STATUS EXAMINATION:  The patient is uncooperative, easily agitated.

Unable to answer the questions appropriately.  Mood was agitated and

irritable.  Affect is flat.  Thought process, there is a paucity of

thought content.  Thought content, no suicidal or homicidal ideations.



ASSESSMENT:  Acute encephalopathy, improving.



PLAN:

1. We will discontinue the Seroquel.  Start the patient on risperidone 2

mg at bedtime.

2. Lexapro 10 mg in the morning.

3. We will continue to follow and readjust the medications.









  ______________________________________________

  Morris Meyers M.D.





DR:  PRATIMA

D:  08/02/2019 23:54

T:  08/03/2019 00:06

JOB#:  [id number]

CC:

## 2019-08-04 VITALS — SYSTOLIC BLOOD PRESSURE: 139 MMHG | DIASTOLIC BLOOD PRESSURE: 78 MMHG

## 2019-08-04 VITALS — DIASTOLIC BLOOD PRESSURE: 76 MMHG | SYSTOLIC BLOOD PRESSURE: 129 MMHG

## 2019-08-04 VITALS — DIASTOLIC BLOOD PRESSURE: 83 MMHG | SYSTOLIC BLOOD PRESSURE: 147 MMHG

## 2019-08-04 VITALS — SYSTOLIC BLOOD PRESSURE: 150 MMHG | DIASTOLIC BLOOD PRESSURE: 92 MMHG

## 2019-08-04 VITALS — SYSTOLIC BLOOD PRESSURE: 138 MMHG | DIASTOLIC BLOOD PRESSURE: 88 MMHG

## 2019-08-04 RX ADMIN — TAMSULOSIN HYDROCHLORIDE SCH MG: 0.4 CAPSULE ORAL at 08:17

## 2019-08-04 RX ADMIN — ASPIRIN SCH MG: 81 TABLET, DELAYED RELEASE ORAL at 08:17

## 2019-08-04 RX ADMIN — MAGNESIUM OXIDE TAB 400 MG (241.3 MG ELEMENTAL MG) SCH MG: 400 (241.3 MG) TAB at 17:13

## 2019-08-04 RX ADMIN — MAGNESIUM OXIDE TAB 400 MG (241.3 MG ELEMENTAL MG) SCH MG: 400 (241.3 MG) TAB at 13:29

## 2019-08-04 RX ADMIN — Medication SCH MG: at 08:17

## 2019-08-04 RX ADMIN — TAMSULOSIN HYDROCHLORIDE SCH MG: 0.4 CAPSULE ORAL at 17:13

## 2019-08-04 RX ADMIN — MAGNESIUM OXIDE TAB 400 MG (241.3 MG ELEMENTAL MG) SCH MG: 400 (241.3 MG) TAB at 08:17

## 2019-08-04 NOTE — UROLOGY PROGRESS NOTE
Assessment/Plan


Assessment/Plan:


1. Staghorn renal calculus.


2. Bladder calculus.


3. Mild hydronephrosis, likely chronic.


4. Chronic kidney disease.


5. Acute kidney injury, improved.


6. Hematuria.


7. Pyuria.


8. Urinary retention.


9. Neurogenic bladder.


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





monitor clinically


pierre out and voiding


monitor renal fxn, stable


s/p abx


cont flomax BID


pt can have tx of stones later electively by urologist in his plan





Subjective


Allergies:  


Coded Allergies:  


     No Known Allergies (Unverified , 7/23/19)


Subjective


all noted, voiding





Objective





Last 24 Hour Vital Signs








  Date Time  Temp Pulse Resp B/P (MAP) Pulse Ox O2 Delivery O2 Flow Rate FiO2


 


8/4/19 08:17  62  150/92    


 


8/4/19 08:00 96.0 62 19 150/92 (111) 97   


 


8/4/19 04:00 96.8 49 19 147/83 (104) 97   


 


8/3/19 23:47 96.9 51 19 121/73 (89) 96   


 


8/3/19 20:05      Room Air  


 


8/3/19 19:59 97.9 57 19 127/77 (94) 96   


 


8/3/19 16:00 98.6 64 18 130/73 (92) 96   


 


8/3/19 12:00 98.1 81 20 125/73 (90) 96   

















Intake and Output  


 


 8/3/19 8/4/19





 18:59 06:59


 


Intake Total  480 ml


 


Output Total  300 ml


 


Balance  180 ml


 


  


 


Intake Oral  480 ml


 


Output Urine Total  300 ml


 


# Voids 3 1


 


# Bowel Movements 2 1











Microbiology








 Date/Time


Source Procedure


Growth Status


 


 


 7/23/19 14:50


Nasal Nares MRSA Culture - Final


Staphylococcus Aureus - Mrsa Complete


 


 7/25/19 04:45


Stool Clostridium difficile Toxin Assay - Final Complete


 


 7/23/19 14:16


Urine,Clean Catch Urine Culture - Final


NO GROWTH AFTER 48 HOURS Complete


 


 7/23/19 14:50


Rectum VRE Culture - Final


NO VANCOMYCIN RESISTANT ENTEROCOCCUS ... Complete








Current Medications








 Medications


  (Trade)  Dose


 Ordered  Sig/Lul


 Route


 PRN Reason  Start Time


 Stop Time Status Last Admin


Dose Admin


 


 Amlodipine


 Besylate


  (Norvasc)  10 mg  DAILY


 ORAL


   7/27/19 09:00


 8/26/19 08:59  8/4/19 08:17


 


 


 Aspirin


  (Ecotrin)  81 mg  DAILY


 ORAL


   7/27/19 09:00


 8/23/19 08:59  8/4/19 08:17


 


 


 Escitalopram


 Oxalate


  (Lexapro)  10 mg  DAILY


 ORAL


   8/2/19 09:00


 9/1/19 08:59  8/4/19 08:17


 


 


 Folic Acid


  (Folate)  1 mg  DAILY


 ORAL


   7/27/19 09:00


 8/23/19 08:59  8/4/19 08:17


 


 


 Magnesium Oxide


  (Mag-Ox 400mg)  400 mg  THREE TIMES A  DAY


 ORAL


   7/30/19 13:00


 8/29/19 12:59  8/4/19 08:17


 


 


 Multivitamins


  (Multivitamins)  1 tab  DAILY


 ORAL


   7/27/19 09:00


 8/23/19 08:59  8/4/19 08:17


 


 


 Risperidone


  (RisperDAL)  2 mg  BEDTIME


 ORAL


   8/3/19 21:00


 9/2/19 20:59  8/3/19 20:47


 


 


 Tamsulosin HCl


  (Flomax)  0.4 mg  BID


 ORAL


   7/27/19 09:00


 8/22/19 19:00  8/4/19 08:17


 


 


 Thiamine HCl


  (Vitamin B1)  100 mg  DAILY


 ORAL


   8/2/19 09:00


 9/1/19 08:59  8/4/19 08:17


 


 


 Tramadol HCl


  (Ultram)  50 mg  Q6H  PRN


 ORAL


 For Pain  8/3/19 08:30


 8/10/19 08:29  8/3/19 16:20


 








Height (Feet):  5


Height (Inches):  10.00


Weight (Pounds):  184


Objective


 exam stable











Israel Pink MD Aug 4, 2019 09:07

## 2019-08-04 NOTE — NEPHROLOGY PROGRESS NOTE
Assessment/Plan


Problem List:  


(1) Bladder calculi


(2) Renal insufficiency


(3) Bilateral renal stones


(4) KENYETTA (acute kidney injury)


(5) Hydronephrosis


(6) Staghorn calculus


(7) Hypomagnesemia


Plan


creatinine lower, 1.6, 1.3 psych consult, will need further  procedures in 

future, urine culture neg stop current antibiotic, c diff + ordered po vanco, 

colonized mrsa, pierre out, voiding, add magnesium oxide





Subjective


Constitutional:  Reports: weakness


HEENT:  Reports: no symptoms


Genitourinary:  Reports: incontinence


Neurologic/Psychiatric:  Reports: no symptoms


Subjective


, no distress-





Objective


Objective





Last 24 Hour Vital Signs








  Date Time  Temp Pulse Resp B/P (MAP) Pulse Ox O2 Delivery O2 Flow Rate FiO2


 


8/4/19 09:00      Room Air  


 


8/4/19 08:17  62  150/92    


 


8/4/19 08:00 96.0 62 19 150/92 (111) 97   


 


8/4/19 04:00 96.8 49 19 147/83 (104) 97   


 


8/3/19 23:47 96.9 51 19 121/73 (89) 96   


 


8/3/19 20:05      Room Air  


 


8/3/19 19:59 97.9 57 19 127/77 (94) 96   


 


8/3/19 16:00 98.6 64 18 130/73 (92) 96   

















Intake and Output  


 


 8/3/19 8/4/19





 19:00 07:00


 


Intake Total  480 ml


 


Output Total  300 ml


 


Balance  180 ml


 


  


 


Intake Oral  480 ml


 


Output Urine Total  300 ml


 


# Voids 3 1


 


# Bowel Movements 2 1








Height (Feet):  5


Height (Inches):  10.00


Weight (Pounds):  184


General Appearance:  no apparent distress


EENT:  normal ENT inspection


Neck:  supple


Cardiovascular:  regular rhythm


Respiratory/Chest:  lungs clear, normal breath sounds


Abdomen:  non tender


Neurologic:  CNs II-XII grossly normal











Manny Eldridge MD Aug 4, 2019 12:37

## 2019-08-05 VITALS — SYSTOLIC BLOOD PRESSURE: 132 MMHG | DIASTOLIC BLOOD PRESSURE: 72 MMHG

## 2019-08-05 VITALS — SYSTOLIC BLOOD PRESSURE: 119 MMHG | DIASTOLIC BLOOD PRESSURE: 69 MMHG

## 2019-08-05 VITALS — SYSTOLIC BLOOD PRESSURE: 130 MMHG | DIASTOLIC BLOOD PRESSURE: 70 MMHG

## 2019-08-05 VITALS — SYSTOLIC BLOOD PRESSURE: 135 MMHG | DIASTOLIC BLOOD PRESSURE: 69 MMHG

## 2019-08-05 VITALS — DIASTOLIC BLOOD PRESSURE: 70 MMHG | SYSTOLIC BLOOD PRESSURE: 139 MMHG

## 2019-08-05 VITALS — SYSTOLIC BLOOD PRESSURE: 125 MMHG | DIASTOLIC BLOOD PRESSURE: 83 MMHG

## 2019-08-05 RX ADMIN — ASPIRIN SCH MG: 81 TABLET, DELAYED RELEASE ORAL at 09:41

## 2019-08-05 RX ADMIN — TAMSULOSIN HYDROCHLORIDE SCH MG: 0.4 CAPSULE ORAL at 17:58

## 2019-08-05 RX ADMIN — MAGNESIUM OXIDE TAB 400 MG (241.3 MG ELEMENTAL MG) SCH MG: 400 (241.3 MG) TAB at 13:42

## 2019-08-05 RX ADMIN — MAGNESIUM OXIDE TAB 400 MG (241.3 MG ELEMENTAL MG) SCH MG: 400 (241.3 MG) TAB at 17:58

## 2019-08-05 RX ADMIN — MAGNESIUM OXIDE TAB 400 MG (241.3 MG ELEMENTAL MG) SCH MG: 400 (241.3 MG) TAB at 09:41

## 2019-08-05 RX ADMIN — Medication SCH MG: at 09:42

## 2019-08-05 RX ADMIN — TAMSULOSIN HYDROCHLORIDE SCH MG: 0.4 CAPSULE ORAL at 09:41

## 2019-08-05 NOTE — UROLOGY PROGRESS NOTE
Assessment/Plan


Assessment/Plan:


1. Staghorn renal calculus.


2. Bladder calculus.


3. Mild hydronephrosis, likely chronic.


4. Chronic kidney disease.


5. Acute kidney injury, improved.


6. Hematuria.


7. Pyuria.


8. Urinary retention.


9. Neurogenic bladder.


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





monitor clinically


pierre out and voiding


monitor renal fxn, stable


s/p abx


cont flomax BID


pt can have tx of stones later electively by urologist in his plan





Subjective


Allergies:  


Coded Allergies:  


     No Known Allergies (Unverified , 7/23/19)


Subjective


all noted, voiding





Objective





Last 24 Hour Vital Signs








  Date Time  Temp Pulse Resp B/P (MAP) Pulse Ox O2 Delivery O2 Flow Rate FiO2


 


8/5/19 04:00 97.5 65 19 135/69 (91) 95   


 


8/5/19 00:00 98.0 60 19 139/70 (93) 96   


 


8/4/19 20:23      Room Air  


 


8/4/19 20:00 98.4 66 19 129/76 (93) 97   


 


8/4/19 16:00 98.0 72 18 139/78 (98) 97   


 


8/4/19 12:00 96.7 80 17 138/88 (105) 95   


 


8/4/19 09:00      Room Air  

















Intake and Output  


 


 8/4/19 8/5/19





 18:59 06:59


 


Intake Total 880 ml 880 ml


 


Balance 880 ml 880 ml


 


  


 


Intake Oral 880 ml 880 ml


 


# Voids 8 7


 


# Bowel Movements 2 1











Microbiology








 Date/Time


Source Procedure


Growth Status


 


 


 7/23/19 14:50


Nasal Nares MRSA Culture - Final


Staphylococcus Aureus - Mrsa Complete


 


 7/25/19 04:45


Stool Clostridium difficile Toxin Assay - Final Complete


 


 7/23/19 14:16


Urine,Clean Catch Urine Culture - Final


NO GROWTH AFTER 48 HOURS Complete


 


 7/23/19 14:50


Rectum VRE Culture - Final


NO VANCOMYCIN RESISTANT ENTEROCOCCUS ... Complete








Current Medications








 Medications


  (Trade)  Dose


 Ordered  Sig/Lul


 Route


 PRN Reason  Start Time


 Stop Time Status Last Admin


Dose Admin


 


 Amlodipine


 Besylate


  (Norvasc)  10 mg  DAILY


 ORAL


   7/27/19 09:00


 8/26/19 08:59  8/4/19 08:17


 


 


 Aspirin


  (Ecotrin)  81 mg  DAILY


 ORAL


   7/27/19 09:00


 8/23/19 08:59  8/4/19 08:17


 


 


 Escitalopram


 Oxalate


  (Lexapro)  10 mg  DAILY


 ORAL


   8/2/19 09:00


 9/1/19 08:59  8/4/19 08:17


 


 


 Folic Acid


  (Folate)  1 mg  DAILY


 ORAL


   7/27/19 09:00


 8/23/19 08:59  8/4/19 08:17


 


 


 Magnesium Oxide


  (Mag-Ox 400mg)  400 mg  THREE TIMES A  DAY


 ORAL


   7/30/19 13:00


 8/29/19 12:59  8/4/19 17:13


 


 


 Multivitamins


  (Multivitamins)  1 tab  DAILY


 ORAL


   7/27/19 09:00


 8/23/19 08:59  8/4/19 08:17


 


 


 Risperidone


  (RisperDAL)  2 mg  BEDTIME


 ORAL


   8/3/19 21:00


 9/2/19 20:59  8/4/19 20:53


 


 


 Tamsulosin HCl


  (Flomax)  0.4 mg  BID


 ORAL


   7/27/19 09:00


 8/22/19 19:00  8/4/19 17:13


 


 


 Thiamine HCl


  (Vitamin B1)  100 mg  DAILY


 ORAL


   8/2/19 09:00


 9/1/19 08:59  8/4/19 08:17


 


 


 Tramadol HCl


  (Ultram)  50 mg  Q6H  PRN


 ORAL


 For Pain  8/3/19 08:30


 8/10/19 08:29  8/3/19 16:20


 








Height (Feet):  5


Height (Inches):  10.00


Weight (Pounds):  184


Objective


 exam stable











Israel Pink MD Aug 5, 2019 08:37

## 2019-08-05 NOTE — PROGRESS NOTE
DATE:  08/03/2019

INTERNAL MEDICINE PROGRESS NOTE



SUBJECTIVE:  Status quo.  No distress.  Oral intake adequate.  No

diarrhea.



OBJECTIVE:

VITAL SIGNS:  Stable.

LUNGS:  Clear.

CARDIAC:  Regular.

ABDOMEN:  Soft, nontender.  No edema.



IMPRESSION:

1. Status post treatment for C. difficile.

2. Acute renal failure, resolved with hydration.

3. Staghorn calculus and nephrolithiasis stable.

4. Prostatic hypertrophy.

5. Schizoaffective disorder, now on psychotropic therapy.

6. Alcoholism, on vitamin supplements and withdrawal precautions.

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



PLAN:

1. Plan of care in place.

2. We will recheck laboratory studies periodically.

3. Efforts for appropriate placement are ongoing.









  ______________________________________________

  Peter Bolaños M.D.





DR:  KI

D:  08/05/2019 00:54

T:  08/05/2019 01:17

JOB#:  [id number]

CC:

## 2019-08-05 NOTE — PROGRESS NOTE
DATE:  08/04/2019

INTERNAL MEDICINE PROGRESS NOTE



SUBJECTIVE:  Condition remains stable.  The patient is off antimicrobials.

Tolerating intake.  No change in exam.



PHYSICAL EXAMINATION:

VITAL SIGNS:  Stable as well and he is afebrile.



PLAN:  We will recheck laboratory studies.  Continuing efforts to find a

safe and appropriate disposition and placement for this patient.









  ______________________________________________

  Peter Bolaños M.D.





DR:  KI

D:  08/05/2019 00:56

T:  08/05/2019 01:21

JOB#:  [id number]

CC:

## 2019-08-05 NOTE — NEPHROLOGY PROGRESS NOTE
Assessment/Plan


Problem List:  


(1) Bladder calculi


(2) Renal insufficiency


(3) Bilateral renal stones


(4) KENYETTA (acute kidney injury)


(5) Hydronephrosis


(6) Staghorn calculus


(7) Hypomagnesemia


Plan


creatinine lower, 1.6, 1.3 psych consult, will need further  procedures in 

future, urine culture neg stop current antibiotic, c diff + ordered po vanco, 

colonized mrsa, pierre out, voiding, add magnesium oxide, repeat lab pending





Subjective


Constitutional:  Reports: weakness


HEENT:  Reports: no symptoms


Genitourinary:  Reports: incontinence


Neurologic/Psychiatric:  Reports: no symptoms


Subjective


, no distress-





Objective


Objective





Last 24 Hour Vital Signs








  Date Time  Temp Pulse Resp B/P (MAP) Pulse Ox O2 Delivery O2 Flow Rate FiO2


 


8/5/19 04:00 97.5 65 19 135/69 (91) 95   


 


8/5/19 00:00 98.0 60 19 139/70 (93) 96   


 


8/4/19 20:23      Room Air  


 


8/4/19 20:00 98.4 66 19 129/76 (93) 97   


 


8/4/19 16:00 98.0 72 18 139/78 (98) 97   


 


8/4/19 12:00 96.7 80 17 138/88 (105) 95   

















Intake and Output  


 


 8/4/19 8/5/19





 19:00 07:00


 


Intake Total 880 ml 880 ml


 


Balance 880 ml 880 ml


 


  


 


Intake Oral 880 ml 880 ml


 


# Voids 8 7


 


# Bowel Movements 2 1








Height (Feet):  5


Height (Inches):  10.00


Weight (Pounds):  184


General Appearance:  no apparent distress


EENT:  other - blind os


Neck:  normal alignment


Cardiovascular:  normal rate, regular rhythm


Respiratory/Chest:  lungs clear


Abdomen:  non tender, soft


Extremities:  other - no edema


Neurologic:  CNs II-XII grossly normal











Manny Eldridge MD Aug 5, 2019 09:08

## 2019-08-05 NOTE — PROGRESS NOTE
DATE:  08/02/2019

INTERNAL MEDICINE PROGRESS NOTE



This is a late entry for 08/02/2019.



SUBJECTIVE:  The patient still has episodes of agitation and defecates in

bed.  He remains on antimicrobials for C. difficile, but his stool is now

formed.



OBJECTIVE:

VITAL SIGNS:  Stable.  He is afebrile.



Exam otherwise without change.



IMPRESSION:  Stable.



PLAN:

1. Complete vancomycin therapy for C. diff.

2. Maintain adequate oral intake and hydration.

3. Outpatient lithotripsy.

4. Continue efforts at placement.









  ______________________________________________

  Peter Bolaños M.D.





DR:  LISANDRO

D:  08/05/2019 00:51

T:  08/05/2019 00:58

JOB#:  [id number]

CC:

## 2019-08-06 VITALS — SYSTOLIC BLOOD PRESSURE: 113 MMHG | DIASTOLIC BLOOD PRESSURE: 69 MMHG

## 2019-08-06 VITALS — DIASTOLIC BLOOD PRESSURE: 73 MMHG | SYSTOLIC BLOOD PRESSURE: 118 MMHG

## 2019-08-06 VITALS — DIASTOLIC BLOOD PRESSURE: 81 MMHG | SYSTOLIC BLOOD PRESSURE: 136 MMHG

## 2019-08-06 VITALS — DIASTOLIC BLOOD PRESSURE: 76 MMHG | SYSTOLIC BLOOD PRESSURE: 129 MMHG

## 2019-08-06 VITALS — SYSTOLIC BLOOD PRESSURE: 139 MMHG | DIASTOLIC BLOOD PRESSURE: 74 MMHG

## 2019-08-06 VITALS — SYSTOLIC BLOOD PRESSURE: 132 MMHG | DIASTOLIC BLOOD PRESSURE: 79 MMHG

## 2019-08-06 LAB
ADD MANUAL DIFF: NO
ALBUMIN SERPL-MCNC: 3.3 G/DL (ref 3.4–5)
ALBUMIN/GLOB SERPL: 0.7 {RATIO} (ref 1–2.7)
ALP SERPL-CCNC: 87 U/L (ref 46–116)
ALT SERPL-CCNC: 95 U/L (ref 12–78)
ANION GAP SERPL CALC-SCNC: 7 MMOL/L (ref 5–15)
AST SERPL-CCNC: 100 U/L (ref 15–37)
BASOPHILS NFR BLD AUTO: 2 % (ref 0–2)
BILIRUB SERPL-MCNC: 0.7 MG/DL (ref 0.2–1)
BUN SERPL-MCNC: 25 MG/DL (ref 7–18)
CALCIUM SERPL-MCNC: 9.5 MG/DL (ref 8.5–10.1)
CHLORIDE SERPL-SCNC: 103 MMOL/L (ref 98–107)
CO2 SERPL-SCNC: 25 MMOL/L (ref 21–32)
CREAT SERPL-MCNC: 1.5 MG/DL (ref 0.55–1.3)
EOSINOPHIL NFR BLD AUTO: 4.4 % (ref 0–3)
ERYTHROCYTE [DISTWIDTH] IN BLOOD BY AUTOMATED COUNT: 14.5 % (ref 11.6–14.8)
GLOBULIN SER-MCNC: 4.9 G/DL
HCT VFR BLD CALC: 40.4 % (ref 42–52)
HGB BLD-MCNC: 13.2 G/DL (ref 14.2–18)
LYMPHOCYTES NFR BLD AUTO: 24.4 % (ref 20–45)
MCV RBC AUTO: 90 FL (ref 80–99)
MONOCYTES NFR BLD AUTO: 7.6 % (ref 1–10)
NEUTROPHILS NFR BLD AUTO: 61.6 % (ref 45–75)
PLATELET # BLD: 180 K/UL (ref 150–450)
POTASSIUM SERPL-SCNC: 5.8 MMOL/L (ref 3.5–5.1)
RBC # BLD AUTO: 4.48 M/UL (ref 4.7–6.1)
SODIUM SERPL-SCNC: 135 MMOL/L (ref 136–145)
WBC # BLD AUTO: 6.7 K/UL (ref 4.8–10.8)

## 2019-08-06 RX ADMIN — MAGNESIUM OXIDE TAB 400 MG (241.3 MG ELEMENTAL MG) SCH MG: 400 (241.3 MG) TAB at 17:54

## 2019-08-06 RX ADMIN — MAGNESIUM OXIDE TAB 400 MG (241.3 MG ELEMENTAL MG) SCH MG: 400 (241.3 MG) TAB at 13:30

## 2019-08-06 RX ADMIN — TAMSULOSIN HYDROCHLORIDE SCH MG: 0.4 CAPSULE ORAL at 17:54

## 2019-08-06 RX ADMIN — TAMSULOSIN HYDROCHLORIDE SCH MG: 0.4 CAPSULE ORAL at 08:58

## 2019-08-06 RX ADMIN — Medication SCH MG: at 08:58

## 2019-08-06 RX ADMIN — ASPIRIN SCH MG: 81 TABLET, DELAYED RELEASE ORAL at 08:58

## 2019-08-06 RX ADMIN — MAGNESIUM OXIDE TAB 400 MG (241.3 MG ELEMENTAL MG) SCH MG: 400 (241.3 MG) TAB at 08:58

## 2019-08-06 NOTE — UROLOGY PROGRESS NOTE
Assessment/Plan


Assessment/Plan:


1. Staghorn renal calculus.


2. Bladder calculus.


3. Mild hydronephrosis, likely chronic.


4. Chronic kidney disease.


5. Acute kidney injury, improved.


6. Hematuria.


7. Pyuria.


8. Urinary retention.


9. Neurogenic bladder.


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





monitor clinically


pierre out and voiding


monitor renal fxn, stable


s/p abx


cont flomax BID


pt can have tx of stones later electively by urologist in his plan





Subjective


Allergies:  


Coded Allergies:  


     No Known Allergies (Unverified , 7/23/19)


Subjective


all noted, voiding, looks comfortable





Objective





Last 24 Hour Vital Signs








  Date Time  Temp Pulse Resp B/P (MAP) Pulse Ox O2 Delivery O2 Flow Rate FiO2


 


8/6/19 04:00 98.4 70 20 113/69 (84) 97   


 


8/6/19 00:00 99.1 66 18 118/73 (88) 96   


 


8/5/19 20:23      Room Air  


 


8/5/19 20:00 97.8 70 20 119/69 (86) 96   


 


8/5/19 16:00 98.1 80 18 132/72 (92) 97   


 


8/5/19 12:00 97.9 46 18 125/83 (97) 98   


 


8/5/19 09:42  68  130/70    


 


8/5/19 09:00      Room Air  


 


8/5/19 08:00 97.7 68 20 130/70 (90) 97   

















Intake and Output  


 


 8/5/19 8/6/19





 19:00 07:00


 


Intake Total 900 ml 


 


Output Total 550 ml 400 ml


 


Balance 350 ml -400 ml


 


  


 


Intake Oral 900 ml 


 


Output Urine Total 550 ml 400 ml


 


# Voids 4 2


 


# Bowel Movements 1 











Microbiology








 Date/Time


Source Procedure


Growth Status


 


 


 7/23/19 14:50


Nasal Nares MRSA Culture - Final


Staphylococcus Aureus - Mrsa Complete


 


 7/25/19 04:45


Stool Clostridium difficile Toxin Assay - Final Complete


 


 7/23/19 14:16


Urine,Clean Catch Urine Culture - Final


NO GROWTH AFTER 48 HOURS Complete


 


 7/23/19 14:50


Rectum VRE Culture - Final


NO VANCOMYCIN RESISTANT ENTEROCOCCUS ... Complete








Current Medications








 Medications


  (Trade)  Dose


 Ordered  Sig/Lul


 Route


 PRN Reason  Start Time


 Stop Time Status Last Admin


Dose Admin


 


 Amlodipine


 Besylate


  (Norvasc)  10 mg  DAILY


 ORAL


   7/27/19 09:00


 8/26/19 08:59  8/5/19 09:42


 


 


 Aspirin


  (Ecotrin)  81 mg  DAILY


 ORAL


   7/27/19 09:00


 8/23/19 08:59  8/5/19 09:41


 


 


 Escitalopram


 Oxalate


  (Lexapro)  10 mg  DAILY


 ORAL


   8/2/19 09:00


 9/1/19 08:59  8/5/19 09:42


 


 


 Folic Acid


  (Folate)  1 mg  DAILY


 ORAL


   7/27/19 09:00


 8/23/19 08:59  8/5/19 09:42


 


 


 Magnesium Oxide


  (Mag-Ox 400mg)  400 mg  THREE TIMES A  DAY


 ORAL


   7/30/19 13:00


 8/29/19 12:59  8/5/19 17:58


 


 


 Multivitamins


  (Multivitamins)  1 tab  DAILY


 ORAL


   7/27/19 09:00


 8/23/19 08:59  8/5/19 09:41


 


 


 Risperidone


  (RisperDAL)  2 mg  BEDTIME


 ORAL


   8/3/19 21:00


 9/2/19 20:59  8/5/19 20:51


 


 


 Tamsulosin HCl


  (Flomax)  0.4 mg  BID


 ORAL


   7/27/19 09:00


 8/22/19 19:00  8/5/19 17:58


 


 


 Thiamine HCl


  (Vitamin B1)  100 mg  DAILY


 ORAL


   8/2/19 09:00


 9/1/19 08:59  8/5/19 09:42


 


 


 Tramadol HCl


  (Ultram)  50 mg  Q6H  PRN


 ORAL


 For Pain  8/3/19 08:30


 8/10/19 08:29  8/3/19 16:20


 








Height (Feet):  5


Height (Inches):  10.00


Weight (Pounds):  184


Objective


 exam stable











Israel Pink MD Aug 6, 2019 07:41

## 2019-08-06 NOTE — PROGRESS NOTE
DATE:  08/06/2019

SUBJECTIVE:  The patient is in bed, in no acute distress.  Calm,

cooperative, more redirectable.



MENTAL STATUS EXAMINATION:  The patient is alert, oriented times self,

place, and situation.  Mood is dysphoric.  Affect is constricted.

Congruent with mood.  Thought process is concrete.  Thought content, no

suicidal or homicidal ideation.



ASSESSMENT:

1. Psychotic disorder.

2. Acute encephalopathy, improved.



PLAN:

1. We will continue current medication.

2. Provide the patient with reality orientation.









  ______________________________________________

  Morris Meyers M.D.





DR:  Oscar

D:  08/06/2019 20:38

T:  08/06/2019 21:28

JOB#:  [id number]

CC:

## 2019-08-06 NOTE — PROGRESS NOTE
DATE:  08/05/2019

INTERNAL MEDICINE PROGRESS NOTE



SUBJECTIVE:  The patient's condition remains stable.  He is off

antimicrobials.  No diarrhea noted.  He is tolerating his diet.  Adrenal

function remains stable.  The patient's disposition is pending an

appropriate placement in view of his psychiatric parameters.









  ______________________________________________

  Peter Bolaños M.D.





DR:  AKHIL

D:  08/06/2019 02:13

T:  08/06/2019 03:05

JOB#:  [id number]

CC:

## 2019-08-07 VITALS — SYSTOLIC BLOOD PRESSURE: 147 MMHG | DIASTOLIC BLOOD PRESSURE: 75 MMHG

## 2019-08-07 VITALS — DIASTOLIC BLOOD PRESSURE: 89 MMHG | SYSTOLIC BLOOD PRESSURE: 145 MMHG

## 2019-08-07 VITALS — DIASTOLIC BLOOD PRESSURE: 77 MMHG | SYSTOLIC BLOOD PRESSURE: 129 MMHG

## 2019-08-07 VITALS — DIASTOLIC BLOOD PRESSURE: 65 MMHG | SYSTOLIC BLOOD PRESSURE: 133 MMHG

## 2019-08-07 VITALS — DIASTOLIC BLOOD PRESSURE: 72 MMHG | SYSTOLIC BLOOD PRESSURE: 147 MMHG

## 2019-08-07 VITALS — SYSTOLIC BLOOD PRESSURE: 97 MMHG | DIASTOLIC BLOOD PRESSURE: 63 MMHG

## 2019-08-07 LAB
ADD MANUAL DIFF: NO
ANION GAP SERPL CALC-SCNC: 8 MMOL/L (ref 5–15)
BASOPHILS NFR BLD AUTO: 1.7 % (ref 0–2)
BUN SERPL-MCNC: 30 MG/DL (ref 7–18)
CALCIUM SERPL-MCNC: 9.5 MG/DL (ref 8.5–10.1)
CHLORIDE SERPL-SCNC: 106 MMOL/L (ref 98–107)
CO2 SERPL-SCNC: 27 MMOL/L (ref 21–32)
CREAT SERPL-MCNC: 1.7 MG/DL (ref 0.55–1.3)
EOSINOPHIL NFR BLD AUTO: 5.1 % (ref 0–3)
ERYTHROCYTE [DISTWIDTH] IN BLOOD BY AUTOMATED COUNT: 14.4 % (ref 11.6–14.8)
HCT VFR BLD CALC: 40.8 % (ref 42–52)
HGB BLD-MCNC: 13.5 G/DL (ref 14.2–18)
LYMPHOCYTES NFR BLD AUTO: 23.9 % (ref 20–45)
MCV RBC AUTO: 91 FL (ref 80–99)
MONOCYTES NFR BLD AUTO: 8.9 % (ref 1–10)
NEUTROPHILS NFR BLD AUTO: 60.4 % (ref 45–75)
PLATELET # BLD: 177 K/UL (ref 150–450)
POTASSIUM SERPL-SCNC: 4.7 MMOL/L (ref 3.5–5.1)
RBC # BLD AUTO: 4.49 M/UL (ref 4.7–6.1)
SODIUM SERPL-SCNC: 141 MMOL/L (ref 136–145)
WBC # BLD AUTO: 7.8 K/UL (ref 4.8–10.8)

## 2019-08-07 RX ADMIN — MAGNESIUM OXIDE TAB 400 MG (241.3 MG ELEMENTAL MG) SCH MG: 400 (241.3 MG) TAB at 09:00

## 2019-08-07 RX ADMIN — Medication SCH MG: at 09:00

## 2019-08-07 RX ADMIN — TAMSULOSIN HYDROCHLORIDE SCH MG: 0.4 CAPSULE ORAL at 09:00

## 2019-08-07 RX ADMIN — TAMSULOSIN HYDROCHLORIDE SCH MG: 0.4 CAPSULE ORAL at 17:22

## 2019-08-07 RX ADMIN — TRAMADOL HYDROCHLORIDE PRN MG: 50 TABLET, FILM COATED ORAL at 11:45

## 2019-08-07 RX ADMIN — MAGNESIUM OXIDE TAB 400 MG (241.3 MG ELEMENTAL MG) SCH MG: 400 (241.3 MG) TAB at 13:23

## 2019-08-07 RX ADMIN — ASPIRIN SCH MG: 81 TABLET, DELAYED RELEASE ORAL at 09:00

## 2019-08-07 RX ADMIN — MAGNESIUM OXIDE TAB 400 MG (241.3 MG ELEMENTAL MG) SCH MG: 400 (241.3 MG) TAB at 17:22

## 2019-08-07 NOTE — PROGRESS NOTE
DATE:  08/07/2019

INTERNAL MEDICINE PROGRESS NOTE



SUBJECTIVE:  The patient without new complaints.  He was restarted on IV

fluids due to worsening renal parameters.  Repeat renal ultrasound is

pending.



OBJECTIVE:

VITAL SIGNS:  Stable.  He is afebrile.

LUNGS:  Clear.

BACK:  No CVA tenderness.

CARDIAC:  Regular.  Normal S1, S2 with no new murmur.

EXTREMITIES:  No edema.

ABDOMEN:  Benign.



IMPRESSION:  Tenuous renal function.



PLAN:

1. Await renal ultrasound.

2. Continue IV fluids and remainder of medication regimen presently

without change.









  ______________________________________________

  Peter Bolaños M.D.





DR:  LISANDRO

D:  08/07/2019 21:20

T:  08/07/2019 21:38

JOB#:  [id number]

CC:

## 2019-08-07 NOTE — PROGRESS NOTE
DATE:  08/07/2019

SUBJECTIVE:  The patient is still the same.  The patient still needs

redirection throughout the day.  Calmer and more cooperative.



MENTAL STATUS EXAMINATION:  The patient is alert and oriented times to self

and place.  Mood is neutral to anxious.  Affect is flat.  Thought process

is concrete.  Thought content, no suicidal or homicidal ideation.  Memory

is impaired.  Insight and judgment is poor.



ASSESSMENT:

1. Psychotic disorder.

2. Acute encephalopathy, resolving.



PLAN:

1. We will continue the risperidone 2 mg at bedtime.

2. Continue the thiamine.

3. Continue the folate.

4. Continue the Lexapro.

5. Provide the patient with reality orientation and supportive

therapy.

6. We will continue to follow and readjust the medications.









  ______________________________________________

  Morris Meyers M.D.





DR:  DEON

D:  08/07/2019 15:44

T:  08/07/2019 18:33

JOB#:  [id number]

CC:

## 2019-08-07 NOTE — PROGRESS NOTE
DATE:  08/06/2019

INTERNAL MEDICINE PROGRESS NOTE



SUBJECTIVE:  The patient has no new complaints.  He is voiding well.  His

lab studies today revealed rise in potassium and BUN/creatinine level.



OBJECTIVE:

VITAL SIGNS:  Blood pressure 129/76, pulse 76, respirations 18, afebrile.

ABDOMEN:  There is no CVA tenderness.

LUNGS:  Clear.

CARDIAC:  Regular.

EXTREMITIES:  No edema.



IMPRESSION:

1. Worsening renal function.

2. Nephrolithiasis.

3. Status post treatment for C. difficile colitis.

4. Hyperkalemia.



PLAN:

1. Saline hydration.

2. Repeat ultrasound of the kidneys.

3. Encourage fluid intake.

4. Repeat potassium level.









  ______________________________________________

  Peter Bolaños M.D.





DR:  CHEVY

D:  08/06/2019 23:47

T:  08/07/2019 00:05

JOB#:  [id number]

CC:

## 2019-08-07 NOTE — UROLOGY PROGRESS NOTE
Assessment/Plan


Assessment/Plan:


1. Staghorn renal calculus.


2. Bladder calculus.


3. Mild hydronephrosis, likely chronic.


4. Chronic kidney disease.


5. Acute kidney injury, improved.


6. Hematuria.


7. Pyuria.


8. Urinary retention.


9. Neurogenic bladder.


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





monitor clinically


pierre out and voiding


monitor renal fxn, stable


s/p abx


cont flomax BID


pt can have tx of stones later electively by urologist in his plan





Subjective


Allergies:  


Coded Allergies:  


     No Known Allergies (Unverified , 7/23/19)


Subjective


all noted, voiding, looks comfortable





Objective





Last 24 Hour Vital Signs








  Date Time  Temp Pulse Resp B/P (MAP) Pulse Ox O2 Delivery O2 Flow Rate FiO2


 


8/7/19 04:50 97.8 76 18 97/63 (74)    


 


8/7/19 00:23 98.1 75 18 133/65 (87)    


 


8/6/19 21:43      Room Air  


 


8/6/19 20:00 98.4 79 18 132/79 (96) 95   


 


8/6/19 16:00 97.4 76  129/76 (93)    


 


8/6/19 12:00 98.8 88 19 136/81 (99) 98   

















Intake and Output  


 


 8/6/19 8/7/19





 19:00 07:00


 


Intake Total 400 ml 500 ml


 


Balance 400 ml 500 ml


 


  


 


Intake Oral 400 ml 


 


IV Total  500 ml


 


# Voids 2 











Microbiology








 Date/Time


Source Procedure


Growth Status


 


 


 7/23/19 14:50


Nasal Nares MRSA Culture - Final


Staphylococcus Aureus - Mrsa Complete


 


 7/25/19 04:45


Stool Clostridium difficile Toxin Assay - Final Complete


 


 7/23/19 14:16


Urine,Clean Catch Urine Culture - Final


NO GROWTH AFTER 48 HOURS Complete


 


 7/23/19 14:50


Rectum VRE Culture - Final


NO VANCOMYCIN RESISTANT ENTEROCOCCUS ... Complete








Current Medications








 Medications


  (Trade)  Dose


 Ordered  Sig/Lul


 Route


 PRN Reason  Start Time


 Stop Time Status Last Admin


Dose Admin


 


 Amlodipine


 Besylate


  (Norvasc)  10 mg  DAILY


 ORAL


   7/27/19 09:00


 8/26/19 08:59  8/6/19 08:58


 


 


 Aspirin


  (Ecotrin)  81 mg  DAILY


 ORAL


   7/27/19 09:00


 8/23/19 08:59  8/6/19 08:58


 


 


 Escitalopram


 Oxalate


  (Lexapro)  10 mg  DAILY


 ORAL


   8/2/19 09:00


 9/1/19 08:59  8/6/19 08:58


 


 


 Folic Acid


  (Folate)  1 mg  DAILY


 ORAL


   7/27/19 09:00


 8/23/19 08:59  8/6/19 08:58


 


 


 Magnesium Oxide


  (Mag-Ox 400mg)  400 mg  THREE TIMES A  DAY


 ORAL


   7/30/19 13:00


 8/29/19 12:59  8/6/19 17:54


 


 


 Multivitamins


  (Multivitamins)  1 tab  DAILY


 ORAL


   7/27/19 09:00


 8/23/19 08:59  8/6/19 08:58


 


 


 Risperidone


  (RisperDAL)  2 mg  BEDTIME


 ORAL


   8/3/19 21:00


 9/2/19 20:59  8/6/19 21:14


 


 


 Sodium Chloride  1,000 ml @ 


 125 mls/hr  Q8H


 IV


   8/6/19 23:45


 9/5/19 23:44  8/6/19 23:45


 


 


 Tamsulosin HCl


  (Flomax)  0.4 mg  BID


 ORAL


   7/27/19 09:00


 8/22/19 19:00  8/6/19 17:54


 


 


 Thiamine HCl


  (Vitamin B1)  100 mg  DAILY


 ORAL


   8/2/19 09:00


 9/1/19 08:59  8/6/19 08:58


 


 


 Tramadol HCl


  (Ultram)  50 mg  Q6H  PRN


 ORAL


 For Pain  8/3/19 08:30


 8/10/19 08:29  8/3/19 16:20


 





Laboratory Tests


8/7/19 06:20: 


White Blood Count 7.8, Red Blood Count 4.49L, Hemoglobin 13.5L, Hematocrit 40.8L

, Mean Corpuscular Volume 91, Mean Corpuscular Hemoglobin 30.0, Mean 

Corpuscular Hemoglobin Concent 33.0, Red Cell Distribution Width 14.4, Platelet 

Count 177, Mean Platelet Volume 6.5, Neutrophils (%) (Auto) 60.4, Lymphocytes (%

) (Auto) 23.9, Monocytes (%) (Auto) 8.9, Eosinophils (%) (Auto) 5.1H, Basophils 

(%) (Auto) 1.7, Sodium Level 141, Potassium Level 4.7, Chloride Level 106, 

Carbon Dioxide Level 27, Anion Gap 8, Blood Urea Nitrogen 30H, Creatinine 1.7H, 

Estimat Glomerular Filtration Rate 40.7, Glucose Level 108H, Uric Acid 7.2, 

Calcium Level 9.5


Height (Feet):  5


Height (Inches):  10.00


Weight (Pounds):  184


Objective


 exam stable











Israel Pink MD Aug 7, 2019 09:13

## 2019-08-08 VITALS — DIASTOLIC BLOOD PRESSURE: 89 MMHG | SYSTOLIC BLOOD PRESSURE: 147 MMHG

## 2019-08-08 VITALS — SYSTOLIC BLOOD PRESSURE: 140 MMHG | DIASTOLIC BLOOD PRESSURE: 82 MMHG

## 2019-08-08 VITALS — SYSTOLIC BLOOD PRESSURE: 135 MMHG | DIASTOLIC BLOOD PRESSURE: 82 MMHG

## 2019-08-08 VITALS — DIASTOLIC BLOOD PRESSURE: 70 MMHG | SYSTOLIC BLOOD PRESSURE: 139 MMHG

## 2019-08-08 VITALS — SYSTOLIC BLOOD PRESSURE: 143 MMHG | DIASTOLIC BLOOD PRESSURE: 82 MMHG

## 2019-08-08 LAB
ADD MANUAL DIFF: NO
ANION GAP SERPL CALC-SCNC: 11 MMOL/L (ref 5–15)
APTT PPP: (no result) S
BASOPHILS NFR BLD AUTO: 1 % (ref 0–2)
BUN SERPL-MCNC: 29 MG/DL (ref 7–18)
CALCIUM SERPL-MCNC: 9.6 MG/DL (ref 8.5–10.1)
CHLORIDE SERPL-SCNC: 104 MMOL/L (ref 98–107)
CO2 SERPL-SCNC: 24 MMOL/L (ref 21–32)
CREAT SERPL-MCNC: 1.7 MG/DL (ref 0.55–1.3)
EOSINOPHIL NFR BLD AUTO: 1.6 % (ref 0–3)
ERYTHROCYTE [DISTWIDTH] IN BLOOD BY AUTOMATED COUNT: 14.5 % (ref 11.6–14.8)
GLUCOSE UR STRIP-MCNC: NEGATIVE MG/DL
HCT VFR BLD CALC: 41.2 % (ref 42–52)
HGB BLD-MCNC: 13.4 G/DL (ref 14.2–18)
KETONES UR QL STRIP: NEGATIVE
LEUKOCYTE ESTERASE UR QL STRIP: (no result)
LYMPHOCYTES NFR BLD AUTO: 10.5 % (ref 20–45)
MCV RBC AUTO: 92 FL (ref 80–99)
MONOCYTES NFR BLD AUTO: 6.9 % (ref 1–10)
NEUTROPHILS NFR BLD AUTO: 80 % (ref 45–75)
NITRITE UR QL STRIP: POSITIVE
PH UR STRIP: 5 [PH] (ref 4.5–8)
PLATELET # BLD: 104 K/UL (ref 150–450)
POTASSIUM SERPL-SCNC: 5.3 MMOL/L (ref 3.5–5.1)
PROT UR QL STRIP: (no result)
RBC # BLD AUTO: 4.48 M/UL (ref 4.7–6.1)
SODIUM SERPL-SCNC: 139 MMOL/L (ref 136–145)
SP GR UR STRIP: 1.01 (ref 1–1.03)
UROBILINOGEN UR-MCNC: NORMAL MG/DL (ref 0–1)
WBC # BLD AUTO: 13.2 K/UL (ref 4.8–10.8)

## 2019-08-08 RX ADMIN — Medication SCH MG: at 08:52

## 2019-08-08 RX ADMIN — ASPIRIN SCH MG: 81 TABLET, DELAYED RELEASE ORAL at 08:51

## 2019-08-08 RX ADMIN — TAMSULOSIN HYDROCHLORIDE SCH MG: 0.4 CAPSULE ORAL at 08:51

## 2019-08-08 RX ADMIN — MAGNESIUM OXIDE TAB 400 MG (241.3 MG ELEMENTAL MG) SCH MG: 400 (241.3 MG) TAB at 17:17

## 2019-08-08 RX ADMIN — TAMSULOSIN HYDROCHLORIDE SCH MG: 0.4 CAPSULE ORAL at 17:17

## 2019-08-08 RX ADMIN — MAGNESIUM OXIDE TAB 400 MG (241.3 MG ELEMENTAL MG) SCH MG: 400 (241.3 MG) TAB at 08:51

## 2019-08-08 RX ADMIN — MAGNESIUM OXIDE TAB 400 MG (241.3 MG ELEMENTAL MG) SCH MG: 400 (241.3 MG) TAB at 13:37

## 2019-08-08 NOTE — UROLOGY PROGRESS NOTE
Assessment/Plan


Assessment/Plan:


1. Staghorn renal calculus.


2. Bladder calculus.


3. Mild hydronephrosis, likely chronic.


4. Chronic kidney disease.


5. Acute kidney injury.


6. Hematuria.


7. Pyuria.


8. Urinary retention.


9. Neurogenic bladder.


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





monitor clinically


pierre out and voiding


monitor renal fxn, labile


s/p abx


cont flomax BID


pt can have tx of stones later electively by urologist in his plan


f/u on renal u/s





Subjective


Allergies:  


Coded Allergies:  


     No Known Allergies (Unverified , 7/23/19)


Subjective


all noted, voiding, looks comfortable





Objective





Last 24 Hour Vital Signs








  Date Time  Temp Pulse Resp B/P (MAP) Pulse Ox O2 Delivery O2 Flow Rate FiO2


 


8/8/19 07:54      Room Air  


 


8/8/19 00:00 98.5 75 17 135/82 (99) 95   


 


8/7/19 22:42      Room Air  


 


8/7/19 20:00 99.0 89 18 129/77 (94) 95   


 


8/7/19 16:00 98.4 84 16 147/72 (97) 97   


 


8/7/19 12:00 98.0 96 17 145/89 (107) 99   


 


8/7/19 09:00      Room Air  


 


8/7/19 09:00  98  147/75    

















Intake and Output  


 


 8/7/19 8/8/19





 19:00 07:00


 


Intake Total 200 ml 200 ml


 


Balance 200 ml 200 ml


 


  


 


IV Total 200 ml 200 ml


 


# Voids  2


 


# Bowel Movements  2











Microbiology








 Date/Time


Source Procedure


Growth Status


 


 


 7/23/19 14:50


Nasal Nares MRSA Culture - Final


Staphylococcus Aureus - Mrsa Complete


 


 7/25/19 04:45


Stool Clostridium difficile Toxin Assay - Final Complete


 


 7/23/19 14:16


Urine,Clean Catch Urine Culture - Final


NO GROWTH AFTER 48 HOURS Complete


 


 7/23/19 14:50


Rectum VRE Culture - Final


NO VANCOMYCIN RESISTANT ENTEROCOCCUS ... Complete








Current Medications








 Medications


  (Trade)  Dose


 Ordered  Sig/Lul


 Route


 PRN Reason  Start Time


 Stop Time Status Last Admin


Dose Admin


 


 Amlodipine


 Besylate


  (Norvasc)  10 mg  DAILY


 ORAL


   7/27/19 09:00


 8/26/19 08:59  8/7/19 09:00


 


 


 Aspirin


  (Ecotrin)  81 mg  DAILY


 ORAL


   7/27/19 09:00


 8/23/19 08:59  8/7/19 09:00


 


 


 Escitalopram


 Oxalate


  (Lexapro)  10 mg  DAILY


 ORAL


   8/2/19 09:00


 9/1/19 08:59  8/7/19 09:00


 


 


 Folic Acid


  (Folate)  1 mg  DAILY


 ORAL


   7/27/19 09:00


 8/23/19 08:59  8/7/19 09:00


 


 


 Magnesium Oxide


  (Mag-Ox 400mg)  400 mg  THREE TIMES A  DAY


 ORAL


   7/30/19 13:00


 8/29/19 12:59  8/7/19 17:22


 


 


 Multivitamins


  (Multivitamins)  1 tab  DAILY


 ORAL


   7/27/19 09:00


 8/23/19 08:59  8/7/19 09:00


 


 


 Risperidone


  (RisperDAL)  2 mg  BEDTIME


 ORAL


   8/3/19 21:00


 9/2/19 20:59  8/7/19 20:21


 


 


 Sodium Chloride  1,000 ml @ 


 125 mls/hr  Q8H


 IV


   8/6/19 23:45


 9/5/19 23:44  8/8/19 07:56


 


 


 Tamsulosin HCl


  (Flomax)  0.4 mg  BID


 ORAL


   7/27/19 09:00


 8/22/19 19:00  8/7/19 17:22


 


 


 Thiamine HCl


  (Vitamin B1)  100 mg  DAILY


 ORAL


   8/2/19 09:00


 9/1/19 08:59  8/7/19 09:00


 


 


 Tramadol HCl


  (Ultram)  50 mg  Q6H  PRN


 ORAL


 For Pain  8/3/19 08:30


 8/10/19 08:29  8/7/19 11:45


 





Laboratory Tests


8/8/19 05:30: 


White Blood Count 13.2#H, Red Blood Count 4.48L, Hemoglobin 13.4L, Hematocrit 

41.2L, Mean Corpuscular Volume 92, Mean Corpuscular Hemoglobin 30.0, Mean 

Corpuscular Hemoglobin Concent 32.6, Red Cell Distribution Width 14.5, Platelet 

Count 104L, Mean Platelet Volume 8.0, Neutrophils (%) (Auto) 80.0H, Lymphocytes 

(%) (Auto) 10.5L, Monocytes (%) (Auto) 6.9, Eosinophils (%) (Auto) 1.6, 

Basophils (%) (Auto) 1.0, Sodium Level 139, Potassium Level 5.3H, Chloride 

Level 104, Carbon Dioxide Level 24, Anion Gap 11, Blood Urea Nitrogen 29H, 

Creatinine 1.7H, Estimat Glomerular Filtration Rate 40.7, Glucose Level 85, 

Calcium Level 9.6


Height (Feet):  5


Height (Inches):  10.00


Weight (Pounds):  184


Objective


 exam stable











Israel Pink MD Aug 8, 2019 08:35

## 2019-08-09 VITALS — DIASTOLIC BLOOD PRESSURE: 69 MMHG | SYSTOLIC BLOOD PRESSURE: 107 MMHG

## 2019-08-09 VITALS — DIASTOLIC BLOOD PRESSURE: 58 MMHG | SYSTOLIC BLOOD PRESSURE: 102 MMHG

## 2019-08-09 VITALS — SYSTOLIC BLOOD PRESSURE: 119 MMHG | DIASTOLIC BLOOD PRESSURE: 73 MMHG

## 2019-08-09 VITALS — SYSTOLIC BLOOD PRESSURE: 102 MMHG | DIASTOLIC BLOOD PRESSURE: 58 MMHG

## 2019-08-09 VITALS — SYSTOLIC BLOOD PRESSURE: 119 MMHG | DIASTOLIC BLOOD PRESSURE: 57 MMHG

## 2019-08-09 VITALS — DIASTOLIC BLOOD PRESSURE: 73 MMHG | SYSTOLIC BLOOD PRESSURE: 129 MMHG

## 2019-08-09 LAB
ADD MANUAL DIFF: NO
ANION GAP SERPL CALC-SCNC: 7 MMOL/L (ref 5–15)
APPEARANCE UR: (no result)
BASOPHILS NFR BLD AUTO: 1 % (ref 0–2)
BUN SERPL-MCNC: 16 MG/DL (ref 7–18)
CALCIUM SERPL-MCNC: 8.5 MG/DL (ref 8.5–10.1)
CHLORIDE SERPL-SCNC: 105 MMOL/L (ref 98–107)
CO2 SERPL-SCNC: 24 MMOL/L (ref 21–32)
CREAT SERPL-MCNC: 1.4 MG/DL (ref 0.55–1.3)
EOSINOPHIL NFR BLD AUTO: 3.5 % (ref 0–3)
ERYTHROCYTE [DISTWIDTH] IN BLOOD BY AUTOMATED COUNT: 13.8 % (ref 11.6–14.8)
HCT VFR BLD CALC: 36.2 % (ref 42–52)
HGB BLD-MCNC: 12 G/DL (ref 14.2–18)
LYMPHOCYTES NFR BLD AUTO: 14.3 % (ref 20–45)
MCV RBC AUTO: 90 FL (ref 80–99)
MONOCYTES NFR BLD AUTO: 10.9 % (ref 1–10)
NEUTROPHILS NFR BLD AUTO: 70.2 % (ref 45–75)
PLATELET # BLD: 132 K/UL (ref 150–450)
POTASSIUM SERPL-SCNC: 3.7 MMOL/L (ref 3.5–5.1)
RBC # BLD AUTO: 4.01 M/UL (ref 4.7–6.1)
SODIUM SERPL-SCNC: 136 MMOL/L (ref 136–145)
WBC # BLD AUTO: 9.8 K/UL (ref 4.8–10.8)

## 2019-08-09 RX ADMIN — MAGNESIUM OXIDE TAB 400 MG (241.3 MG ELEMENTAL MG) SCH MG: 400 (241.3 MG) TAB at 12:43

## 2019-08-09 RX ADMIN — Medication SCH MG: at 09:28

## 2019-08-09 RX ADMIN — SODIUM CHLORIDE SCH MLS/HR: 0.9 INJECTION INTRAVENOUS at 21:00

## 2019-08-09 RX ADMIN — MAGNESIUM OXIDE TAB 400 MG (241.3 MG ELEMENTAL MG) SCH MG: 400 (241.3 MG) TAB at 17:36

## 2019-08-09 RX ADMIN — SODIUM CHLORIDE SCH MLS/HR: 0.9 INJECTION INTRAVENOUS at 09:30

## 2019-08-09 RX ADMIN — TAMSULOSIN HYDROCHLORIDE SCH MG: 0.4 CAPSULE ORAL at 17:36

## 2019-08-09 RX ADMIN — TAMSULOSIN HYDROCHLORIDE SCH MG: 0.4 CAPSULE ORAL at 09:28

## 2019-08-09 RX ADMIN — MAGNESIUM OXIDE TAB 400 MG (241.3 MG ELEMENTAL MG) SCH MG: 400 (241.3 MG) TAB at 09:28

## 2019-08-09 RX ADMIN — ASPIRIN SCH MG: 81 TABLET, DELAYED RELEASE ORAL at 09:28

## 2019-08-09 NOTE — UROLOGY PROGRESS NOTE
Assessment/Plan


Assessment/Plan:


1. Staghorn renal calculus.


2. Bladder calculus.


3. Mild hydronephrosis, likely chronic.


4. Chronic kidney disease.


5. Acute kidney injury.


6. Hematuria.


7. Pyuria, poss UTI.


8. Urinary retention.


9. Neurogenic bladder.


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





monitor clinically


pierre out and voiding


monitor renal fxn, labile


s/p abx, will resume


cont flomax BID


pt can have tx of stones later electively by urologist in his plan


f/u on renal u/s and urine cx





Subjective


Allergies:  


Coded Allergies:  


     No Known Allergies (Unverified , 7/23/19)


Subjective


all noted, voiding, looks comfortable, condom cath





Objective





Last 24 Hour Vital Signs








  Date Time  Temp Pulse Resp B/P (MAP) Pulse Ox O2 Delivery O2 Flow Rate FiO2


 


8/9/19 04:00 97.6 75 18 129/73 (91) 98   


 


8/9/19 00:00 99.4 82 18 119/73 (88) 96   


 


8/8/19 21:00      Room Air  


 


8/8/19 20:00 98.2 83 20 139/70 (93) 96   


 


8/8/19 16:00 99.0 70 17 143/82 (102) 96   


 


8/8/19 12:00 99.5 83 18 147/89 (108) 97   


 


8/8/19 08:51  83  140/82    


 


8/8/19 08:00 99.0 83 19 140/82 (101) 96   

















Intake and Output  


 


 8/8/19 8/9/19





 19:00 07:00


 


Intake Total 2095 ml 1730 ml


 


Output Total  600 ml


 


Balance 2095 ml 1130 ml


 


  


 


Intake Oral 720 ml 480 ml


 


IV Total 1375 ml 1250 ml


 


Output Urine Total  600 ml


 


# Voids 5 


 


# Bowel Movements 5 











Microbiology








 Date/Time


Source Procedure


Growth Status


 


 


 7/23/19 14:50


Nasal Nares MRSA Culture - Final


Staphylococcus Aureus - Mrsa Complete


 


 7/25/19 04:45


Stool Clostridium difficile Toxin Assay - Final Complete


 


 7/23/19 14:16


Urine,Clean Catch Urine Culture - Final


NO GROWTH AFTER 48 HOURS Complete


 


 7/23/19 14:50


Rectum VRE Culture - Final


NO VANCOMYCIN RESISTANT ENTEROCOCCUS ... Complete








Current Medications








 Medications


  (Trade)  Dose


 Ordered  Sig/Lul


 Route


 PRN Reason  Start Time


 Stop Time Status Last Admin


Dose Admin


 


 Amlodipine


 Besylate


  (Norvasc)  10 mg  DAILY


 ORAL


   7/27/19 09:00


 8/26/19 08:59  8/8/19 08:51


 


 


 Aspirin


  (Ecotrin)  81 mg  DAILY


 ORAL


   7/27/19 09:00


 8/23/19 08:59  8/8/19 08:51


 


 


 Escitalopram


 Oxalate


  (Lexapro)  10 mg  DAILY


 ORAL


   8/2/19 09:00


 9/1/19 08:59  8/8/19 08:51


 


 


 Folic Acid


  (Folate)  1 mg  DAILY


 ORAL


   7/27/19 09:00


 8/23/19 08:59  8/8/19 08:51


 


 


 Magnesium Oxide


  (Mag-Ox 400mg)  400 mg  THREE TIMES A  DAY


 ORAL


   7/30/19 13:00


 8/29/19 12:59  8/8/19 17:17


 


 


 Multivitamins


  (Multivitamins)  1 tab  DAILY


 ORAL


   7/27/19 09:00


 8/23/19 08:59  8/8/19 08:51


 


 


 Risperidone


  (RisperDAL)  2 mg  BEDTIME


 ORAL


   8/3/19 21:00


 9/2/19 20:59  8/8/19 21:02


 


 


 Sodium Chloride  1,000 ml @ 


 125 mls/hr  Q8H


 IV


   8/6/19 23:45


 9/5/19 23:44  8/9/19 05:34


 


 


 Tamsulosin HCl


  (Flomax)  0.4 mg  BID


 ORAL


   7/27/19 09:00


 8/22/19 19:00  8/8/19 17:17


 


 


 Thiamine HCl


  (Vitamin B1)  100 mg  DAILY


 ORAL


   8/2/19 09:00


 9/1/19 08:59  8/8/19 08:52


 


 


 Tramadol HCl


  (Ultram)  50 mg  Q6H  PRN


 ORAL


 For Pain  8/3/19 08:30


 8/10/19 08:29  8/7/19 11:45


 





Laboratory Tests


8/8/19 23:00: 


Urine Color Pale yellow, Urine Appearance Slightly cloudy, Urine pH 5, Urine 

Specific Gravity 1.010, Urine Protein 1+H, Urine Glucose (UA) Negative, Urine 

Ketones Negative, Urine Blood 5+H, Urine Nitrite PositiveH, Urine Bilirubin 

Negative, Urine Urobilinogen Normal, Urine Leukocyte Esterase 2+H, Urine RBC 30-

40H, Urine WBC 40-60H, Urine Squamous Epithelial Cells Few, Urine Bacteria 

ModerateH


8/9/19 07:30: 


White Blood Count 9.8, Red Blood Count 4.01L, Hemoglobin 12.0L, Hematocrit 36.2L

, Mean Corpuscular Volume 90, Mean Corpuscular Hemoglobin 29.8, Mean 

Corpuscular Hemoglobin Concent 33.1, Red Cell Distribution Width 13.8, Platelet 

Count 132L, Mean Platelet Volume 6.9, Neutrophils (%) (Auto) 70.2, Lymphocytes (

%) (Auto) 14.3L, Monocytes (%) (Auto) 10.9H, Eosinophils (%) (Auto) 3.5H, 

Basophils (%) (Auto) 1.0, Sodium Level [Pending], Potassium Level [Pending], 

Chloride Level [Pending], Carbon Dioxide Level [Pending], Blood Urea Nitrogen [

Pending], Creatinine [Pending], Estimat Glomerular Filtration Rate [Pending], 

Glucose Level [Pending], Uric Acid [Pending], Calcium Level [Pending]


Height (Feet):  5


Height (Inches):  10.00


Weight (Pounds):  184


Objective


 exam stable











Israel Pink MD Aug 9, 2019 08:00

## 2019-08-09 NOTE — PROGRESS NOTE
DATE:  08/08/2019

INTERNAL MEDICINE PROGRESS NOTE



SUBJECTIVE:  The patient's condition largely unchanged.  Renal ultrasound

is pending.  Concern over his renal function deteriorating over the past

48 hours prompted this study to be obtained.



OBJECTIVE:

LUNGS:  Clear.

CARDIAC:  Regular.

ABDOMEN:  Soft.

EXTREMITIES:  No edema.



IMPRESSION:

1. Status post treatment for C. difficile.

2. Acute on chronic renal failure improved and now worsening.

3. Nephrolithiasis with multiple calculi.

4. Mild hyperkalemia.



PLAN:

1. Continue hydration.

2. Follow up renal scan.

3. Recheck laboratory studies.









  ______________________________________________

  Peter Bolaños M.D.





DR:  AKHIL

D:  08/09/2019 01:22

T:  08/09/2019 04:21

JOB#:  [id number]

CC:

## 2019-08-09 NOTE — DIAGNOSTIC IMAGING REPORT
RENAL ULTRASOUND

 

Indication: Renal stones

 

Technique: Color doppler and greyscale images of the kidneys and bladder were

obtained. 

 

Comparison:

 

Findings: 

 

The right kidney measures 10 cm in length. The left kidney measures 12 cm in length. 

 

Bilateral kidneys demonstrate normal echogenicity. There is no hydronephrosis.

 

In the right kidney, there is a nonobstructing calculus measuring up to 1 cm. In the

expected location of the renal pelvis, there is a stone measuring up to 1 cm. There

are additional tiny cysts.

 

In the left kidney, there is a nonobstructing lower pole calculus measuring up to 0.5

cm. There is an extrarenal pelvis which contains a large calculus measures up to 2.3

cm.

 

The bladder is partially distended with marked concentric wall thickening. The

bladder contains a large bladder stone. Incidental note is made of a diverticulum in

the dome of the bladder with a wide neck, measuring up to 9 mm.

 

The visualized portions of the inferior vena cava are unremarkable. 

 

IMPRESSION:

 

1.  Nonobstructing right nephrolithiasis including a renal pelvis stone. No

hydronephrosis.

2.  Large left extra renal pelvis calculus with moderate hydroureter, which can also

be seen on comparison CT dated 7/23/2019

3.  Large bladder stone with concentric bladder wall thickening, likely secondary to

outlet obstruction.

## 2019-08-09 NOTE — PROGRESS NOTE
DATE:  08/09/2019

SUBJECTIVE:  The patient is the same.  Calm, more redirectable.  No

behavior issues.  Awaiting placement.



MENTAL STATUS EXAMINATION:  Alert and oriented times to self, place, and

situation.  Mood is neutral.  Affect is flat.  Thought process, there is a

paucity of thought content.  Thought content, no suicidal or homicidal

ideation.  Memory is impaired.



ASSESSMENT:  Stable.



PLAN:  We will continue current medications.  Provide the patient with

reality orientation and supportive therapy.









  ______________________________________________

  Morris Meyers M.D.





DR:  DEON

D:  08/09/2019 13:48

T:  08/09/2019 20:05

JOB#:  [id number]

CC:

## 2019-08-10 VITALS — SYSTOLIC BLOOD PRESSURE: 138 MMHG | DIASTOLIC BLOOD PRESSURE: 76 MMHG

## 2019-08-10 VITALS — DIASTOLIC BLOOD PRESSURE: 70 MMHG | SYSTOLIC BLOOD PRESSURE: 119 MMHG

## 2019-08-10 VITALS — SYSTOLIC BLOOD PRESSURE: 132 MMHG | DIASTOLIC BLOOD PRESSURE: 67 MMHG

## 2019-08-10 VITALS — SYSTOLIC BLOOD PRESSURE: 159 MMHG | DIASTOLIC BLOOD PRESSURE: 89 MMHG

## 2019-08-10 VITALS — SYSTOLIC BLOOD PRESSURE: 122 MMHG | DIASTOLIC BLOOD PRESSURE: 70 MMHG

## 2019-08-10 VITALS — DIASTOLIC BLOOD PRESSURE: 77 MMHG | SYSTOLIC BLOOD PRESSURE: 141 MMHG

## 2019-08-10 RX ADMIN — ASPIRIN SCH MG: 81 TABLET, DELAYED RELEASE ORAL at 08:55

## 2019-08-10 RX ADMIN — MAGNESIUM OXIDE TAB 400 MG (241.3 MG ELEMENTAL MG) SCH MG: 400 (241.3 MG) TAB at 08:55

## 2019-08-10 RX ADMIN — TAMSULOSIN HYDROCHLORIDE SCH MG: 0.4 CAPSULE ORAL at 17:43

## 2019-08-10 RX ADMIN — SODIUM CHLORIDE SCH MLS/HR: 0.9 INJECTION INTRAVENOUS at 08:55

## 2019-08-10 RX ADMIN — TAMSULOSIN HYDROCHLORIDE SCH MG: 0.4 CAPSULE ORAL at 08:55

## 2019-08-10 RX ADMIN — MAGNESIUM OXIDE TAB 400 MG (241.3 MG ELEMENTAL MG) SCH MG: 400 (241.3 MG) TAB at 12:42

## 2019-08-10 RX ADMIN — SODIUM CHLORIDE SCH MLS/HR: 0.9 INJECTION INTRAVENOUS at 20:50

## 2019-08-10 RX ADMIN — MAGNESIUM OXIDE TAB 400 MG (241.3 MG ELEMENTAL MG) SCH MG: 400 (241.3 MG) TAB at 17:43

## 2019-08-10 RX ADMIN — Medication SCH MG: at 08:55

## 2019-08-10 NOTE — UROLOGY PROGRESS NOTE
Assessment/Plan


Assessment/Plan:


1. Staghorn renal calculus.


2. Bladder calculus.


3. Mild hydronephrosis, likely chronic.


4. Chronic kidney disease.


5. Acute kidney injury.


6. Hematuria.


7. Pyuria, poss UTI.


8. Urinary retention.


9. Neurogenic bladder.


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





monitor clinically


pierre out and voiding


monitor renal fxn, labile


on abx now


cont flomax BID


pt can have tx of stones later electively by urologist in his plan


f/u on urine cx





Subjective


Allergies:  


Coded Allergies:  


     No Known Allergies (Unverified , 7/23/19)


Subjective


all noted, voiding, looks comfortable, condom cath





Objective





Last 24 Hour Vital Signs








  Date Time  Temp Pulse Resp B/P (MAP) Pulse Ox O2 Delivery O2 Flow Rate FiO2


 


8/10/19 04:00 98.4 68 20 138/76 (96) 96   


 


8/10/19 00:00 97.8 69 18 122/70 (87) 96   


 


8/9/19 21:00      Room Air  


 


8/9/19 20:00 98.4 78 18 119/57 (77) 97   


 


8/9/19 15:48 98.5 79 18 107/69 (82) 98   


 


8/9/19 11:56 98.0 80 18 102/58 (73) 97   


 


8/9/19 09:28  87  102/58    


 


8/9/19 08:00 98.8 87 19 102/58 (73) 95   


 


8/9/19 08:00      Room Air  

















Intake and Output  


 


 8/9/19 8/10/19





 19:00 07:00


 


Intake Total 1505 ml 1665 ml


 


Balance 1505 ml 1665 ml


 


  


 


Intake Oral  360 ml


 


IV Total 1505 ml 1305 ml


 


# Bowel Movements 6 











Microbiology








 Date/Time


Source Procedure


Growth Status


 


 


 7/23/19 14:50


Nasal Nares MRSA Culture - Final


Staphylococcus Aureus - Mrsa Complete


 


 7/25/19 04:45


Stool Clostridium difficile Toxin Assay - Final Complete


 


 8/8/19 23:00


Urine,Clean Catch Urine Culture - Preliminary


NO GROWTH AFTER 24 HOURS Resulted


 


 7/23/19 14:50


Rectum VRE Culture - Final


NO VANCOMYCIN RESISTANT ENTEROCOCCUS ... Complete








Current Medications








 Medications


  (Trade)  Dose


 Ordered  Sig/Lul


 Route


 PRN Reason  Start Time


 Stop Time Status Last Admin


Dose Admin


 


 Amlodipine


 Besylate


  (Norvasc)  10 mg  DAILY


 ORAL


   7/27/19 09:00


 8/26/19 08:59  8/9/19 09:28


 


 


 Aspirin


  (Ecotrin)  81 mg  DAILY


 ORAL


   7/27/19 09:00


 8/23/19 08:59  8/9/19 09:28


 


 


 Cefepime HCl 1 gm/


 Dextrose  55 ml @ 


 110 mls/hr  EVERY 12  HOURS


 IVPB


   8/9/19 09:30


 8/16/19 09:29  8/9/19 21:00


 


 


 Escitalopram


 Oxalate


  (Lexapro)  10 mg  DAILY


 ORAL


   8/2/19 09:00


 9/1/19 08:59  8/9/19 09:28


 


 


 Folic Acid


  (Folate)  1 mg  DAILY


 ORAL


   7/27/19 09:00


 8/23/19 08:59  8/9/19 09:28


 


 


 Magnesium Oxide


  (Mag-Ox 400mg)  400 mg  THREE TIMES A  DAY


 ORAL


   7/30/19 13:00


 8/29/19 12:59  8/9/19 17:36


 


 


 Multivitamins


  (Multivitamins)  1 tab  DAILY


 ORAL


   7/27/19 09:00


 8/23/19 08:59  8/9/19 09:28


 


 


 Risperidone


  (RisperDAL)  2 mg  BEDTIME


 ORAL


   8/9/19 21:00


 9/2/19 20:59  8/9/19 20:36


 


 


 Sodium Chloride  1,000 ml @ 


 125 mls/hr  Q8H


 IV


   8/6/19 23:45


 9/5/19 23:44  8/10/19 06:27


 


 


 Tamsulosin HCl


  (Flomax)  0.4 mg  BID


 ORAL


   7/27/19 09:00


 8/22/19 19:00  8/9/19 17:36


 


 


 Thiamine HCl


  (Vitamin B1)  100 mg  DAILY


 ORAL


   8/2/19 09:00


 9/1/19 08:59  8/9/19 09:28


 


 


 Tramadol HCl


  (Ultram)  50 mg  Q6H  PRN


 ORAL


 For Pain  8/9/19 13:45


 8/16/19 13:44   


 








Height (Feet):  5


Height (Inches):  10.00


Weight (Pounds):  184


Objective


 exam stable





last renal u/s negative for hydro











Israel Pink MD Aug 10, 2019 07:59

## 2019-08-10 NOTE — PROGRESS NOTE
DATE:  08/09/2019

SUBJECTIVE:  The patient has been on IV fluids for two days.  His renal

function has improved.  His electrolytes have stabilized.  There is no

sign of active infection.  Renal ultrasound has revealed stone in bladder

with bladder outlet obstruction.  His exam remains unchanged.  We will

_____ need for intervention with urologist.  May have to transfer this

patient to a tertiary care facility for complicated urologic procedure.









  ______________________________________________

  Peter Bolaños M.D.





DR:  STEVEN

D:  08/10/2019 00:18

T:  08/10/2019 00:29

JOB#:  [id number]

CC:

## 2019-08-10 NOTE — PROGRESS NOTE
DATE:  08/10/2019

INTERNAL MEDICINE PROGRESS NOTE



SUBJECTIVE:  The patient is voiding well.  He remains on IV fluids.  Renal

ultrasound revealed bladder outlet obstruction.  Renal parameters worsened

off IV fluids, but has stabilized now.



OBJECTIVE:

VITAL SIGNS:  Blood pressure 119/70, pulse 78, and respirations 18.

LUNGS:  Clear.

CARDIAC:  Regular.

ABDOMEN:  Soft.  No focal tenderness.  No CVA tenderness.

EXTREMITIES:  No edema.



IMPRESSION:

1. Obstructive uropathy.

2. Nephrolithiasis.

3. Urolithiasis, status post Clostridium difficile.

4. Hypertension, resolved.

5. Bradycardia, off beta-blocker.



PLAN:

1. Decrease intravenous fluids.

2. Follow up renal function.

3. We will need to arrange transfer to a tertiary care facility for

definitive urologic intervention.









  ______________________________________________

  Peter Bolaños M.D.





DR:  MARA

D:  08/10/2019 17:49

T:  08/10/2019 21:57

JOB#:  [id number]

CC:

## 2019-08-11 VITALS — DIASTOLIC BLOOD PRESSURE: 72 MMHG | SYSTOLIC BLOOD PRESSURE: 134 MMHG

## 2019-08-11 VITALS — SYSTOLIC BLOOD PRESSURE: 73 MMHG | DIASTOLIC BLOOD PRESSURE: 72 MMHG

## 2019-08-11 VITALS — SYSTOLIC BLOOD PRESSURE: 136 MMHG | DIASTOLIC BLOOD PRESSURE: 57 MMHG

## 2019-08-11 VITALS — DIASTOLIC BLOOD PRESSURE: 84 MMHG | SYSTOLIC BLOOD PRESSURE: 143 MMHG

## 2019-08-11 VITALS — DIASTOLIC BLOOD PRESSURE: 77 MMHG | SYSTOLIC BLOOD PRESSURE: 132 MMHG

## 2019-08-11 LAB
APPEARANCE UR: CLEAR
APTT PPP: (no result) S
GLUCOSE UR STRIP-MCNC: NEGATIVE MG/DL
KETONES UR QL STRIP: NEGATIVE
LEUKOCYTE ESTERASE UR QL STRIP: (no result)
NITRITE UR QL STRIP: NEGATIVE
PH UR STRIP: 6 [PH] (ref 4.5–8)
PROT UR QL STRIP: (no result)
SP GR UR STRIP: 1.01 (ref 1–1.03)
UROBILINOGEN UR-MCNC: NORMAL MG/DL (ref 0–1)

## 2019-08-11 RX ADMIN — MAGNESIUM OXIDE TAB 400 MG (241.3 MG ELEMENTAL MG) SCH MG: 400 (241.3 MG) TAB at 12:02

## 2019-08-11 RX ADMIN — SODIUM CHLORIDE SCH MLS/HR: 0.9 INJECTION INTRAVENOUS at 22:37

## 2019-08-11 RX ADMIN — SODIUM CHLORIDE SCH MLS/HR: 0.9 INJECTION INTRAVENOUS at 10:27

## 2019-08-11 RX ADMIN — TRAMADOL HYDROCHLORIDE PRN MG: 50 TABLET, FILM COATED ORAL at 22:24

## 2019-08-11 RX ADMIN — MAGNESIUM OXIDE TAB 400 MG (241.3 MG ELEMENTAL MG) SCH MG: 400 (241.3 MG) TAB at 17:45

## 2019-08-11 RX ADMIN — MAGNESIUM OXIDE TAB 400 MG (241.3 MG ELEMENTAL MG) SCH MG: 400 (241.3 MG) TAB at 10:26

## 2019-08-11 RX ADMIN — ASPIRIN SCH MG: 81 TABLET, DELAYED RELEASE ORAL at 10:26

## 2019-08-11 RX ADMIN — Medication SCH MG: at 10:24

## 2019-08-11 RX ADMIN — TAMSULOSIN HYDROCHLORIDE SCH MG: 0.4 CAPSULE ORAL at 17:44

## 2019-08-11 RX ADMIN — TAMSULOSIN HYDROCHLORIDE SCH MG: 0.4 CAPSULE ORAL at 10:23

## 2019-08-11 NOTE — PROGRESS NOTE
DATE:  08/11/2019

INTERNAL MEDICINE PROGRESS NOTE



SUBJECTIVE:  The patient has no new complaints.  Urine culture remains

negative.



OBJECTIVE:

VITAL SIGNS:  Blood pressure 140/73, pulse 74, respirations 18.

LUNGS:  Clear.

CARDIAC:  Regular.

ABDOMEN:  Soft.

BACK:  No CVA tenderness.

EXTREMITIES:  No edema.



IMPRESSION:

1. Bladder outlet obstruction.

2. Nephro and urolithiasis.

3. Acute on chronic renal failure.

4. Status post urinary tract infection.

5. Sinus bradycardia, resolved off beta-blocker.

6. Hypertensive heart disease.

7. Left corneal opacity and blindness.

8. Status post treatment for C. difficile.



PLAN:  Needs adequate hydration to prevent worsening renal impairment due

to obstructing uronephrolithiasis.  Does not need additional

antimicrobials presently, but will need to closely observe for new

infections.  We will need to arrange transfer to a higher level of care

for complex urologic intervention of multiple stones.









  ______________________________________________

  Peter Bolaños M.D.





DR:  CHEVY

D:  08/11/2019 17:01

T:  08/11/2019 18:27

JOB#:  [id number]

CC:

## 2019-08-11 NOTE — UROLOGY PROGRESS NOTE
Assessment/Plan


Assessment/Plan:


1. Staghorn renal calculus.


2. Bladder calculus.


3. Mild hydronephrosis, likely chronic.


4. Chronic kidney disease.


5. Acute kidney injury.


6. Hematuria.


7. Pyuria, poss UTI.


8. Urinary retention.


9. Neurogenic bladder.


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





monitor clinically


pierre out and voiding


monitor renal fxn, labile


on abx now


cont flomax BID


add proscar


pt with very significant and complex stone burden


will likely need multiple complicated urologic procedures with close follow up


best course of action would be higher lever of care at tertiary care facility


d/w Dr. Bolaños fully





Subjective


Allergies:  


Coded Allergies:  


     No Known Allergies (Unverified , 7/23/19)


Subjective


all noted, voiding, looks comfortable, condom cath





Objective





Last 24 Hour Vital Signs








  Date Time  Temp Pulse Resp B/P (MAP) Pulse Ox O2 Delivery O2 Flow Rate FiO2


 


8/11/19 10:26  74  140/73    


 


8/11/19 08:00 98.4 74 16 73/72 (72) 96   


 


8/11/19 08:00 98.4 74 16 140/73 (95) 96   


 


8/11/19 05:17 99.0 72 17 134/72 (92) 95   


 


8/11/19 04:00 99.0 72 17 134/72 (92) 95   


 


8/11/19 00:00 98.7 85 18 143/84 (103) 95   


 


8/10/19 23:00      Room Air  


 


8/10/19 20:00 99.3 88 17 159/89 (112) 97   


 


8/10/19 16:00 98.5 78 18 119/70 (86) 97   

















Intake and Output  


 


 8/10/19 8/11/19





 19:00 07:00


 


Intake Total 1000 ml 


 


Output Total 300 ml 


 


Balance 700 ml 


 


  


 


Other 1000 ml 


 


Output Urine Total 300 ml 


 


# Voids  2


 


# Bowel Movements 8 2











Microbiology








 Date/Time


Source Procedure


Growth Status


 


 


 7/23/19 14:50


Nasal Nares MRSA Culture - Final


Staphylococcus Aureus - Mrsa Complete


 


 7/25/19 04:45


Stool Clostridium difficile Toxin Assay - Final Complete


 


 8/8/19 23:00


Urine,Clean Catch Urine Culture - Final


NO GROWTH AFTER 48 HOURS Complete


 


 7/23/19 14:50


Rectum VRE Culture - Final


NO VANCOMYCIN RESISTANT ENTEROCOCCUS ... Complete








Current Medications








 Medications


  (Trade)  Dose


 Ordered  Sig/Lul


 Route


 PRN Reason  Start Time


 Stop Time Status Last Admin


Dose Admin


 


 Amlodipine


 Besylate


  (Norvasc)  10 mg  DAILY


 ORAL


   7/27/19 09:00


 8/26/19 08:59  8/11/19 10:26


 


 


 Aspirin


  (Ecotrin)  81 mg  DAILY


 ORAL


   7/27/19 09:00


 8/23/19 08:59  8/11/19 10:26


 


 


 Cefepime HCl 1 gm/


 Dextrose  55 ml @ 


 110 mls/hr  EVERY 12  HOURS


 IVPB


   8/9/19 09:30


 8/16/19 09:29  8/11/19 10:27


 


 


 Escitalopram


 Oxalate


  (Lexapro)  10 mg  DAILY


 ORAL


   8/2/19 09:00


 9/1/19 08:59  8/11/19 10:26


 


 


 Folic Acid


  (Folate)  1 mg  DAILY


 ORAL


   7/27/19 09:00


 8/23/19 08:59  8/11/19 10:26


 


 


 Magnesium Oxide


  (Mag-Ox 400mg)  400 mg  THREE TIMES A  DAY


 ORAL


   7/30/19 13:00


 8/29/19 12:59  8/11/19 12:02


 


 


 Multivitamins


  (Multivitamins)  1 tab  DAILY


 ORAL


   7/27/19 09:00


 8/23/19 08:59  8/11/19 10:26


 


 


 Risperidone


  (RisperDAL)  2 mg  BEDTIME


 ORAL


   8/9/19 21:00


 9/2/19 20:59  8/10/19 20:49


 


 


 Sodium Chloride  1,000 ml @ 


 75 mls/hr  [id number]


 IV


   8/10/19 18:00


 9/9/19 17:59   


 


 


 Tamsulosin HCl


  (Flomax)  0.4 mg  BID


 ORAL


   7/27/19 09:00


 8/22/19 19:00  8/11/19 10:23


 


 


 Thiamine HCl


  (Vitamin B1)  100 mg  DAILY


 ORAL


   8/2/19 09:00


 9/1/19 08:59  8/11/19 10:24


 


 


 Tramadol HCl


  (Ultram)  50 mg  Q6H  PRN


 ORAL


 For Pain  8/9/19 13:45


 8/16/19 13:44   


 








Height (Feet):  5


Height (Inches):  10.00


Weight (Pounds):  184


Objective


 exam stable





last renal u/s negative for Israel Juarez MD Aug 11, 2019 12:42

## 2019-08-12 VITALS — SYSTOLIC BLOOD PRESSURE: 109 MMHG | DIASTOLIC BLOOD PRESSURE: 52 MMHG

## 2019-08-12 VITALS — SYSTOLIC BLOOD PRESSURE: 126 MMHG | DIASTOLIC BLOOD PRESSURE: 70 MMHG

## 2019-08-12 VITALS — SYSTOLIC BLOOD PRESSURE: 141 MMHG | DIASTOLIC BLOOD PRESSURE: 76 MMHG

## 2019-08-12 VITALS — SYSTOLIC BLOOD PRESSURE: 134 MMHG | DIASTOLIC BLOOD PRESSURE: 76 MMHG

## 2019-08-12 VITALS — DIASTOLIC BLOOD PRESSURE: 71 MMHG | SYSTOLIC BLOOD PRESSURE: 121 MMHG

## 2019-08-12 VITALS — DIASTOLIC BLOOD PRESSURE: 61 MMHG | SYSTOLIC BLOOD PRESSURE: 144 MMHG

## 2019-08-12 LAB
ADD MANUAL DIFF: NO
ALBUMIN SERPL-MCNC: 2.8 G/DL (ref 3.4–5)
ALBUMIN/GLOB SERPL: 0.6 {RATIO} (ref 1–2.7)
ALP SERPL-CCNC: 80 U/L (ref 46–116)
ALT SERPL-CCNC: 60 U/L (ref 12–78)
ANION GAP SERPL CALC-SCNC: 6 MMOL/L (ref 5–15)
AST SERPL-CCNC: 45 U/L (ref 15–37)
BASOPHILS NFR BLD AUTO: 2.1 % (ref 0–2)
BILIRUB SERPL-MCNC: 0.6 MG/DL (ref 0.2–1)
BUN SERPL-MCNC: 14 MG/DL (ref 7–18)
CALCIUM SERPL-MCNC: 8.9 MG/DL (ref 8.5–10.1)
CHLORIDE SERPL-SCNC: 106 MMOL/L (ref 98–107)
CO2 SERPL-SCNC: 28 MMOL/L (ref 21–32)
CREAT SERPL-MCNC: 1.2 MG/DL (ref 0.55–1.3)
EOSINOPHIL NFR BLD AUTO: 9.3 % (ref 0–3)
ERYTHROCYTE [DISTWIDTH] IN BLOOD BY AUTOMATED COUNT: 13.6 % (ref 11.6–14.8)
GLOBULIN SER-MCNC: 4.4 G/DL
HCT VFR BLD CALC: 35.3 % (ref 42–52)
HGB BLD-MCNC: 11.8 G/DL (ref 14.2–18)
LYMPHOCYTES NFR BLD AUTO: 27.2 % (ref 20–45)
MCV RBC AUTO: 90 FL (ref 80–99)
MONOCYTES NFR BLD AUTO: 18.6 % (ref 1–10)
NEUTROPHILS NFR BLD AUTO: 42.9 % (ref 45–75)
PLATELET # BLD: 199 K/UL (ref 150–450)
POTASSIUM SERPL-SCNC: 3.7 MMOL/L (ref 3.5–5.1)
RBC # BLD AUTO: 3.91 M/UL (ref 4.7–6.1)
SODIUM SERPL-SCNC: 140 MMOL/L (ref 136–145)
WBC # BLD AUTO: 6.7 K/UL (ref 4.8–10.8)

## 2019-08-12 RX ADMIN — Medication SCH MG: at 09:10

## 2019-08-12 RX ADMIN — MAGNESIUM OXIDE TAB 400 MG (241.3 MG ELEMENTAL MG) SCH MG: 400 (241.3 MG) TAB at 13:31

## 2019-08-12 RX ADMIN — MAGNESIUM OXIDE TAB 400 MG (241.3 MG ELEMENTAL MG) SCH MG: 400 (241.3 MG) TAB at 09:10

## 2019-08-12 RX ADMIN — SODIUM CHLORIDE SCH MLS/HR: 0.9 INJECTION INTRAVENOUS at 09:07

## 2019-08-12 RX ADMIN — TRAMADOL HYDROCHLORIDE PRN MG: 50 TABLET, FILM COATED ORAL at 14:00

## 2019-08-12 RX ADMIN — SODIUM CHLORIDE SCH MLS/HR: 0.9 INJECTION INTRAVENOUS at 22:13

## 2019-08-12 RX ADMIN — ASPIRIN SCH MG: 81 TABLET, DELAYED RELEASE ORAL at 09:10

## 2019-08-12 RX ADMIN — MAGNESIUM OXIDE TAB 400 MG (241.3 MG ELEMENTAL MG) SCH MG: 400 (241.3 MG) TAB at 16:58

## 2019-08-12 RX ADMIN — TAMSULOSIN HYDROCHLORIDE SCH MG: 0.4 CAPSULE ORAL at 09:10

## 2019-08-12 RX ADMIN — TAMSULOSIN HYDROCHLORIDE SCH MG: 0.4 CAPSULE ORAL at 16:58

## 2019-08-12 NOTE — UROLOGY PROGRESS NOTE
Assessment/Plan


Assessment/Plan:


1. Staghorn renal calculus.


2. Bladder calculus.


3. Mild hydronephrosis, likely chronic.


4. Chronic kidney disease.


5. Acute kidney injury.


6. Hematuria.


7. Pyuria, poss UTI.


8. Urinary retention.


9. Neurogenic bladder.


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





monitor clinically


pierre out and voiding


monitor renal fxn, labile but improved


on abx now


cont flomax BID


proscar added


pt with very significant and complex stone burden


will likely need multiple complicated urologic procedures with close follow up


best course of action would be higher lever of care at tertiary care facility


d/w Dr. Bolaños fully





Subjective


Allergies:  


Coded Allergies:  


     No Known Allergies (Unverified , 7/23/19)


Subjective


all noted, voiding, looks comfortable





Objective





Last 24 Hour Vital Signs








  Date Time  Temp Pulse Resp B/P (MAP) Pulse Ox O2 Delivery O2 Flow Rate FiO2


 


8/12/19 04:00 96.9 70 20 109/52 (71) 98   


 


8/12/19 00:00 98.7 70 18 144/61 (88) 98   


 


8/11/19 21:00      Room Air  


 


8/11/19 20:00 98.3 73 18 136/57 (83) 96   


 


8/11/19 16:00 98.3 81 18 132/77 (95) 96   


 


8/11/19 10:26  74  140/73    


 


8/11/19 09:00      Room Air  

















Intake and Output  


 


 8/11/19 8/12/19





 18:59 06:59


 


Intake Total 1255 ml 1255 ml


 


Balance 1255 ml 1255 ml


 


  


 


Intake Oral  600 ml


 


IV Total 55 ml 655 ml


 


Other 1200 ml 


 


# Voids  4


 


# Bowel Movements 4 8











Microbiology








 Date/Time


Source Procedure


Growth Status


 


 


 7/23/19 14:50


Nasal Nares MRSA Culture - Final


Staphylococcus Aureus - Mrsa Complete


 


 7/25/19 04:45


Stool Clostridium difficile Toxin Assay - Final Complete


 


 8/8/19 23:00


Urine,Clean Catch Urine Culture - Final


NO GROWTH AFTER 48 HOURS Complete


 


 7/23/19 14:50


Rectum VRE Culture - Final


NO VANCOMYCIN RESISTANT ENTEROCOCCUS ... Complete








Current Medications








 Medications


  (Trade)  Dose


 Ordered  Sig/Lul


 Route


 PRN Reason  Start Time


 Stop Time Status Last Admin


Dose Admin


 


 Amlodipine


 Besylate


  (Norvasc)  10 mg  DAILY


 ORAL


   7/27/19 09:00


 8/26/19 08:59  8/11/19 10:26


 


 


 Aspirin


  (Ecotrin)  81 mg  DAILY


 ORAL


   7/27/19 09:00


 8/23/19 08:59  8/11/19 10:26


 


 


 Cefepime HCl 1 gm/


 Dextrose  55 ml @ 


 110 mls/hr  EVERY 12  HOURS


 IVPB


   8/9/19 09:30


 8/16/19 09:29  8/11/19 22:37


 


 


 Escitalopram


 Oxalate


  (Lexapro)  10 mg  DAILY


 ORAL


   8/2/19 09:00


 9/1/19 08:59  8/11/19 10:26


 


 


 Finasteride


  (Proscar)  5 mg  DAILY


 ORAL


   8/12/19 09:00


 9/11/19 08:59   


 


 


 Folic Acid


  (Folate)  1 mg  DAILY


 ORAL


   7/27/19 09:00


 8/23/19 08:59  8/11/19 10:26


 


 


 Magnesium Oxide


  (Mag-Ox 400mg)  400 mg  THREE TIMES A  DAY


 ORAL


   7/30/19 13:00


 8/29/19 12:59  8/11/19 17:45


 


 


 Multivitamins


  (Multivitamins)  1 tab  DAILY


 ORAL


   7/27/19 09:00


 8/23/19 08:59  8/11/19 10:26


 


 


 Risperidone


  (RisperDAL)  2 mg  BEDTIME


 ORAL


   8/9/19 21:00


 9/2/19 20:59  8/11/19 22:24


 


 


 Sodium Chloride  1,000 ml @ 


 75 mls/hr  [id number]


 IV


   8/10/19 18:00


 9/9/19 17:59  8/11/19 22:36


 


 


 Tamsulosin HCl


  (Flomax)  0.4 mg  BID


 ORAL


   7/27/19 09:00


 8/22/19 19:00  8/11/19 17:44


 


 


 Thiamine HCl


  (Vitamin B1)  100 mg  DAILY


 ORAL


   8/2/19 09:00


 9/1/19 08:59  8/11/19 10:24


 


 


 Tramadol HCl


  (Ultram)  50 mg  Q6H  PRN


 ORAL


 For Pain  8/9/19 13:45


 8/16/19 13:44  8/11/19 22:24


 





Laboratory Tests


8/11/19 17:20: 


Urine Color Pale yellow, Urine Appearance Clear, Urine pH 6, Urine Specific 

Gravity 1.015, Urine Protein 2+H, Urine Glucose (UA) Negative, Urine Ketones 

Negative, Urine Blood 4+H, Urine Nitrite Negative, Urine Bilirubin Negative, 

Urine Urobilinogen Normal, Urine Leukocyte Esterase 2+H, Urine RBC 10-15H, 

Urine WBC 2-4, Urine Squamous Epithelial Cells None, Urine Bacteria Few


8/12/19 07:05: 


White Blood Count 6.7, Red Blood Count 3.91L, Hemoglobin 11.8L, Hematocrit 35.3L

, Mean Corpuscular Volume 90, Mean Corpuscular Hemoglobin 30.2, Mean 

Corpuscular Hemoglobin Concent 33.4, Red Cell Distribution Width 13.6, Platelet 

Count 199, Mean Platelet Volume 6.6, Neutrophils (%) (Auto) 42.9L, Lymphocytes (

%) (Auto) 27.2, Monocytes (%) (Auto) 18.6H, Eosinophils (%) (Auto) 9.3H, 

Basophils (%) (Auto) 2.1H, Sodium Level 140, Potassium Level 3.7, Chloride 

Level 106, Carbon Dioxide Level 28, Anion Gap 6, Blood Urea Nitrogen 14, 

Creatinine 1.2, Estimat Glomerular Filtration Rate > 60, Glucose Level 89, 

Calcium Level 8.9, Magnesium Level 1.9, Total Bilirubin 0.6, Aspartate Amino 

Transf (AST/SGOT) 45H, Alanine Aminotransferase (ALT/SGPT) 60, Alkaline 

Phosphatase 80, Total Protein 7.2, Albumin 2.8L, Globulin 4.4, Albumin/Globulin 

Ratio 0.6L


Height (Feet):  5


Height (Inches):  10.00


Weight (Pounds):  184


Objective


 exam stable





last renal u/s negative for hydro











Israel Pink MD Aug 12, 2019 08:14

## 2019-08-13 VITALS — SYSTOLIC BLOOD PRESSURE: 139 MMHG | DIASTOLIC BLOOD PRESSURE: 75 MMHG

## 2019-08-13 VITALS — SYSTOLIC BLOOD PRESSURE: 119 MMHG | DIASTOLIC BLOOD PRESSURE: 75 MMHG

## 2019-08-13 VITALS — SYSTOLIC BLOOD PRESSURE: 128 MMHG | DIASTOLIC BLOOD PRESSURE: 67 MMHG

## 2019-08-13 VITALS — SYSTOLIC BLOOD PRESSURE: 137 MMHG | DIASTOLIC BLOOD PRESSURE: 65 MMHG

## 2019-08-13 VITALS — SYSTOLIC BLOOD PRESSURE: 127 MMHG | DIASTOLIC BLOOD PRESSURE: 68 MMHG

## 2019-08-13 VITALS — SYSTOLIC BLOOD PRESSURE: 116 MMHG | DIASTOLIC BLOOD PRESSURE: 60 MMHG

## 2019-08-13 RX ADMIN — Medication SCH MG: at 08:51

## 2019-08-13 RX ADMIN — MAGNESIUM OXIDE TAB 400 MG (241.3 MG ELEMENTAL MG) SCH MG: 400 (241.3 MG) TAB at 17:07

## 2019-08-13 RX ADMIN — MAGNESIUM OXIDE TAB 400 MG (241.3 MG ELEMENTAL MG) SCH MG: 400 (241.3 MG) TAB at 12:31

## 2019-08-13 RX ADMIN — ASPIRIN SCH MG: 81 TABLET, DELAYED RELEASE ORAL at 08:51

## 2019-08-13 RX ADMIN — TAMSULOSIN HYDROCHLORIDE SCH MG: 0.4 CAPSULE ORAL at 08:51

## 2019-08-13 RX ADMIN — MAGNESIUM OXIDE TAB 400 MG (241.3 MG ELEMENTAL MG) SCH MG: 400 (241.3 MG) TAB at 08:51

## 2019-08-13 RX ADMIN — TAMSULOSIN HYDROCHLORIDE SCH MG: 0.4 CAPSULE ORAL at 17:08

## 2019-08-13 NOTE — DISCHARGE SUMMARY
DATE OF ADMISSION:  07/23/2019







INTERNAL MEDICINE PROGRESS NOTE



SUBJECTIVE:  No new complaints.  Condition largely unchanged.  We are

awaiting for disposition to a higher level of care for urologic

interventions of bladder outlet obstruction and nephro and urolithiasis.

Laboratories are reviewed.  The patient remains on IV fluid hydration,

which is being tapered off.  Antimicrobials will be discontinued as well

since new cultures are negative.









  ______________________________________________

  Peter Bolaños M.D.





DR:  AKHIL

D:  08/13/2019 01:25

T:  08/13/2019 01:47

JOB#:  [id number]

CC:

## 2019-08-13 NOTE — UROLOGY PROGRESS NOTE
Assessment/Plan


Assessment/Plan:


1. Staghorn renal calculus.


2. Bladder calculus.


3. Mild hydronephrosis, likely chronic.


4. Chronic kidney disease.


5. Acute kidney injury.


6. Hematuria.


7. Pyuria, poss UTI.


8. Urinary retention.


9. Neurogenic bladder.


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





monitor clinically


pierre out and voiding


monitor renal fxn, labile but improved


s/p abx course, now off


cont flomax BID


proscar added


pt with very significant and complex stone burden


will likely need multiple complicated urologic procedures with close follow up


best course of action would be higher lever of care at tertiary care facility





Subjective


Allergies:  


Coded Allergies:  


     No Known Allergies (Unverified , 7/23/19)


Subjective


all noted, voiding, looks comfortable





Objective





Last 24 Hour Vital Signs








  Date Time  Temp Pulse Resp B/P (MAP) Pulse Ox O2 Delivery O2 Flow Rate FiO2


 


8/13/19 04:00 97.9 72 18 116/60 (78) 94   


 


8/13/19 00:00 97.6 70 16 128/67 (87) 94   


 


8/12/19 21:00      Room Air  


 


8/12/19 20:00 97.7 64 16 126/70 (88) 94   


 


8/12/19 16:00 98.4 66 20 141/76 (97) 94   


 


8/12/19 14:30 98.2       


 


8/12/19 12:00 98.2 64 20 134/76 (95) 96   


 


8/12/19 09:11  69  121/71    


 


8/12/19 09:00      Room Air  

















Intake and Output  


 


 8/12/19 8/13/19





 19:00 07:00


 


Intake Total 1300 ml 775 ml


 


Balance 1300 ml 775 ml


 


  


 


Intake Oral 720 ml 120 ml


 


IV Total 580 ml 655 ml


 


# Voids 3 6


 


# Bowel Movements 4 6











Microbiology








 Date/Time


Source Procedure


Growth Status


 


 


 7/23/19 14:50


Nasal Nares MRSA Culture - Final


Staphylococcus Aureus - Mrsa Complete


 


 7/25/19 04:45


Stool Clostridium difficile Toxin Assay - Final Complete


 


 8/8/19 23:00


Urine,Clean Catch Urine Culture - Final


NO GROWTH AFTER 48 HOURS Complete


 


 7/23/19 14:50


Rectum VRE Culture - Final


NO VANCOMYCIN RESISTANT ENTEROCOCCUS ... Complete








Current Medications








 Medications


  (Trade)  Dose


 Ordered  Sig/Lul


 Route


 PRN Reason  Start Time


 Stop Time Status Last Admin


Dose Admin


 


 Amlodipine


 Besylate


  (Norvasc)  10 mg  DAILY


 ORAL


   7/27/19 09:00


 8/26/19 08:59  8/12/19 09:11


 


 


 Aspirin


  (Ecotrin)  81 mg  DAILY


 ORAL


   7/27/19 09:00


 8/23/19 08:59  8/12/19 09:10


 


 


 Escitalopram


 Oxalate


  (Lexapro)  10 mg  DAILY


 ORAL


   8/2/19 09:00


 9/1/19 08:59  8/12/19 09:10


 


 


 Finasteride


  (Proscar)  5 mg  DAILY


 ORAL


   8/12/19 09:00


 9/11/19 08:59  8/12/19 09:10


 


 


 Folic Acid


  (Folate)  1 mg  DAILY


 ORAL


   7/27/19 09:00


 8/23/19 08:59  8/12/19 09:09


 


 


 Magnesium Oxide


  (Mag-Ox 400mg)  400 mg  THREE TIMES A  DAY


 ORAL


   7/30/19 13:00


 8/29/19 12:59  8/12/19 16:58


 


 


 Multivitamins


  (Multivitamins)  1 tab  DAILY


 ORAL


   7/27/19 09:00


 8/23/19 08:59  8/12/19 09:10


 


 


 Risperidone


  (RisperDAL)  2 mg  BEDTIME


 ORAL


   8/9/19 21:00


 9/2/19 20:59  8/12/19 22:06


 


 


 Sodium Chloride  1,000 ml @ 


 50 mls/hr  Q20H


 IV


   8/13/19 02:00


 9/12/19 01:59  8/13/19 02:33


 


 


 Tamsulosin HCl


  (Flomax)  0.4 mg  BID


 ORAL


   7/27/19 09:00


 8/22/19 19:00  8/12/19 16:58


 


 


 Thiamine HCl


  (Vitamin B1)  100 mg  DAILY


 ORAL


   8/2/19 09:00


 9/1/19 08:59  8/12/19 09:10


 


 


 Tramadol HCl


  (Ultram)  50 mg  Q6H  PRN


 ORAL


 For Pain  8/9/19 13:45


 8/16/19 13:44  8/12/19 14:00


 








Height (Feet):  5


Height (Inches):  10.00


Weight (Pounds):  184


Objective


 exam stable





last renal u/s negative for Israel Juarez MD Aug 13, 2019 08:09

## 2019-08-14 VITALS — SYSTOLIC BLOOD PRESSURE: 135 MMHG | DIASTOLIC BLOOD PRESSURE: 58 MMHG

## 2019-08-14 VITALS — SYSTOLIC BLOOD PRESSURE: 105 MMHG | DIASTOLIC BLOOD PRESSURE: 56 MMHG

## 2019-08-14 VITALS — DIASTOLIC BLOOD PRESSURE: 78 MMHG | SYSTOLIC BLOOD PRESSURE: 152 MMHG

## 2019-08-14 VITALS — SYSTOLIC BLOOD PRESSURE: 129 MMHG | DIASTOLIC BLOOD PRESSURE: 87 MMHG

## 2019-08-14 VITALS — SYSTOLIC BLOOD PRESSURE: 144 MMHG | DIASTOLIC BLOOD PRESSURE: 80 MMHG

## 2019-08-14 VITALS — DIASTOLIC BLOOD PRESSURE: 73 MMHG | SYSTOLIC BLOOD PRESSURE: 125 MMHG

## 2019-08-14 RX ADMIN — MAGNESIUM OXIDE TAB 400 MG (241.3 MG ELEMENTAL MG) SCH MG: 400 (241.3 MG) TAB at 08:37

## 2019-08-14 RX ADMIN — MAGNESIUM OXIDE TAB 400 MG (241.3 MG ELEMENTAL MG) SCH MG: 400 (241.3 MG) TAB at 17:28

## 2019-08-14 RX ADMIN — MAGNESIUM OXIDE TAB 400 MG (241.3 MG ELEMENTAL MG) SCH MG: 400 (241.3 MG) TAB at 12:34

## 2019-08-14 RX ADMIN — ASPIRIN SCH MG: 81 TABLET, DELAYED RELEASE ORAL at 08:37

## 2019-08-14 RX ADMIN — TAMSULOSIN HYDROCHLORIDE SCH MG: 0.4 CAPSULE ORAL at 08:37

## 2019-08-14 RX ADMIN — Medication SCH MG: at 08:36

## 2019-08-14 RX ADMIN — TAMSULOSIN HYDROCHLORIDE SCH MG: 0.4 CAPSULE ORAL at 17:28

## 2019-08-14 NOTE — PROGRESS NOTE
DATE:  08/13/2019

INTERNAL MEDICINE PROGRESS NOTE



Late entry.



SUBJECTIVE:  We have spoken with case management, who has communicated with

insurance.  We are awaiting transfer to a higher level of care for complex

urologic intervention.  Condition remains unchanged.



OBJECTIVE:  Vitals are stable.  Exam is unchanged.



The patient has complex stone burden and needs multiple urologic

procedures to be performed at tertiary care facility.









  ______________________________________________

  Peter Bolaños M.D.





DR:  AKHIL

D:  08/14/2019 17:49

T:  08/14/2019 22:44

JOB#:  [id number]

CC:

## 2019-08-14 NOTE — PROGRESS NOTE
DATE:  08/14/2019

INTERNAL MEDICINE PROGRESS NOTE



SUBJECTIVE:  No new complaints.



OBJECTIVE:

VITAL SIGNS:  Stable.

LUNGS:  Clear.

CARDIAC:  Regular.

ABDOMEN:  Soft.  No CVA tenderness.

EXTREMITIES:  No edema.



IMPRESSION:

1. Staghorn calculus.

2. Bladder outlet obstruction due to extensive nephrolithiasis and

urolithiasis.

3. Moderate protein-calorie malnutrition.

4. Status post C. difficile colitis.

5. Hypertension.

6. Anemia of chronic disease.



PLAN:

1. Monitor clinical parameters.

2. Discontinue IV fluids.

3. Await transfer to a tertiary care facility for lithotripsies and

complex urologic surgery.









  ______________________________________________

  Peter Bolaños M.D.





DR:  AKHIL

D:  08/14/2019 17:55

T:  08/14/2019 22:46

JOB#:  [id number]

CC:

## 2019-08-14 NOTE — UROLOGY PROGRESS NOTE
Assessment/Plan


Assessment/Plan:


1. Staghorn renal calculus.


2. Bladder calculus.


3. Mild hydronephrosis, likely chronic.


4. Chronic kidney disease.


5. Acute kidney injury.


6. Hematuria.


7. Pyuria, poss UTI.


8. Urinary retention.


9. Neurogenic bladder.


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





monitor clinically


pierre out and voiding


monitor renal fxn, labile but improved


s/p abx course, now off


cont flomax BID


proscar added


pt with very significant and complex stone burden


will likely need multiple complicated urologic procedures with close follow up


best course of action would be higher lever of care at tertiary care facility





Subjective


Allergies:  


Coded Allergies:  


     No Known Allergies (Unverified , 7/23/19)


Subjective


all noted, voiding, looks comfortable





Objective





Last 24 Hour Vital Signs








  Date Time  Temp Pulse Resp B/P (MAP) Pulse Ox O2 Delivery O2 Flow Rate FiO2


 


8/14/19 08:00 98.8 66 18 152/78 (102) 94   


 


8/14/19 04:00 97.6 65 15 144/80 (101) 94   


 


8/14/19 00:00 97.7 74 16 125/73 (90) 94   


 


8/13/19 21:00      Room Air  


 


8/13/19 20:00 97.9 73 16 127/68 (87) 94   


 


8/13/19 16:00 98.7 79 18 119/75 (90) 97   


 


8/13/19 12:00 98.8 69 17 137/65 (89) 97   


 


8/13/19 09:00      Room Air  


 


8/13/19 08:51  77  139/75    

















Intake and Output  


 


 8/13/19 8/14/19





 19:00 07:00


 


Intake Total 600 ml 1120 ml


 


Balance 600 ml 1120 ml


 


  


 


Intake Oral  120 ml


 


IV Total 600 ml 400 ml


 


Other  600 ml


 


# Voids  4


 


# Bowel Movements 1 3











Microbiology








 Date/Time


Source Procedure


Growth Status


 


 


 7/23/19 14:50


Nasal Nares MRSA Culture - Final


Staphylococcus Aureus - Mrsa Complete


 


 7/25/19 04:45


Stool Clostridium difficile Toxin Assay - Final Complete


 


 8/8/19 23:00


Urine,Clean Catch Urine Culture - Final


NO GROWTH AFTER 48 HOURS Complete


 


 7/23/19 14:50


Rectum VRE Culture - Final


NO VANCOMYCIN RESISTANT ENTEROCOCCUS ... Complete








Current Medications








 Medications


  (Trade)  Dose


 Ordered  Sig/Lul


 Route


 PRN Reason  Start Time


 Stop Time Status Last Admin


Dose Admin


 


 Amlodipine


 Besylate


  (Norvasc)  10 mg  DAILY


 ORAL


   7/27/19 09:00


 8/26/19 08:59  8/13/19 08:51


 


 


 Aspirin


  (Ecotrin)  81 mg  DAILY


 ORAL


   7/27/19 09:00


 8/23/19 08:59  8/13/19 08:51


 


 


 Escitalopram


 Oxalate


  (Lexapro)  10 mg  DAILY


 ORAL


   8/2/19 09:00


 9/1/19 08:59  8/13/19 08:51


 


 


 Finasteride


  (Proscar)  5 mg  DAILY


 ORAL


   8/12/19 09:00


 9/11/19 08:59  8/13/19 08:51


 


 


 Folic Acid


  (Folate)  1 mg  DAILY


 ORAL


   7/27/19 09:00


 8/23/19 08:59  8/13/19 08:51


 


 


 Magnesium Oxide


  (Mag-Ox 400mg)  400 mg  THREE TIMES A  DAY


 ORAL


   7/30/19 13:00


 8/29/19 12:59  8/13/19 17:07


 


 


 Multivitamins


  (Multivitamins)  1 tab  DAILY


 ORAL


   7/27/19 09:00


 8/23/19 08:59  8/13/19 08:51


 


 


 Risperidone


  (RisperDAL)  2 mg  BEDTIME


 ORAL


   8/9/19 21:00


 9/2/19 20:59  8/13/19 20:49


 


 


 Sodium Chloride  1,000 ml @ 


 50 mls/hr  Q20H


 IV


   8/13/19 02:00


 9/12/19 01:59  8/13/19 22:48


 


 


 Tamsulosin HCl


  (Flomax)  0.4 mg  BID


 ORAL


   7/27/19 09:00


 8/22/19 19:00  8/13/19 17:08


 


 


 Thiamine HCl


  (Vitamin B1)  100 mg  DAILY


 ORAL


   8/2/19 09:00


 9/1/19 08:59  8/13/19 08:51


 


 


 Tramadol HCl


  (Ultram)  50 mg  Q6H  PRN


 ORAL


 For Pain  8/9/19 13:45


 8/16/19 13:44  8/12/19 14:00


 








Height (Feet):  5


Height (Inches):  10.00


Weight (Pounds):  184


Objective


 exam stable





last renal u/s negative for Israel Juarez MD Aug 14, 2019 08:35

## 2019-08-15 VITALS — DIASTOLIC BLOOD PRESSURE: 65 MMHG | SYSTOLIC BLOOD PRESSURE: 129 MMHG

## 2019-08-15 VITALS — DIASTOLIC BLOOD PRESSURE: 87 MMHG | SYSTOLIC BLOOD PRESSURE: 129 MMHG

## 2019-08-15 VITALS — SYSTOLIC BLOOD PRESSURE: 128 MMHG | DIASTOLIC BLOOD PRESSURE: 74 MMHG

## 2019-08-15 VITALS — SYSTOLIC BLOOD PRESSURE: 125 MMHG | DIASTOLIC BLOOD PRESSURE: 72 MMHG

## 2019-08-15 VITALS — SYSTOLIC BLOOD PRESSURE: 140 MMHG | DIASTOLIC BLOOD PRESSURE: 83 MMHG

## 2019-08-15 VITALS — SYSTOLIC BLOOD PRESSURE: 133 MMHG | DIASTOLIC BLOOD PRESSURE: 78 MMHG

## 2019-08-15 LAB
ADD MANUAL DIFF: YES
ALBUMIN SERPL-MCNC: 2.5 G/DL (ref 3.4–5)
ALBUMIN/GLOB SERPL: 0.6 {RATIO} (ref 1–2.7)
ALP SERPL-CCNC: 72 U/L (ref 46–116)
ALT SERPL-CCNC: 42 U/L (ref 12–78)
ANION GAP SERPL CALC-SCNC: 8 MMOL/L (ref 5–15)
AST SERPL-CCNC: 38 U/L (ref 15–37)
BILIRUB SERPL-MCNC: 0.5 MG/DL (ref 0.2–1)
BUN SERPL-MCNC: 13 MG/DL (ref 7–18)
CALCIUM SERPL-MCNC: 8.6 MG/DL (ref 8.5–10.1)
CHLORIDE SERPL-SCNC: 104 MMOL/L (ref 98–107)
CO2 SERPL-SCNC: 25 MMOL/L (ref 21–32)
CREAT SERPL-MCNC: 1.2 MG/DL (ref 0.55–1.3)
ERYTHROCYTE [DISTWIDTH] IN BLOOD BY AUTOMATED COUNT: 13.3 % (ref 11.6–14.8)
GLOBULIN SER-MCNC: 4.1 G/DL
HCT VFR BLD CALC: 35.9 % (ref 42–52)
HGB BLD-MCNC: 11.9 G/DL (ref 14.2–18)
MCV RBC AUTO: 89 FL (ref 80–99)
PLATELET # BLD: 216 K/UL (ref 150–450)
POTASSIUM SERPL-SCNC: 3.7 MMOL/L (ref 3.5–5.1)
RBC # BLD AUTO: 4.05 M/UL (ref 4.7–6.1)
SODIUM SERPL-SCNC: 137 MMOL/L (ref 136–145)
WBC # BLD AUTO: 7.1 K/UL (ref 4.8–10.8)

## 2019-08-15 RX ADMIN — TAMSULOSIN HYDROCHLORIDE SCH MG: 0.4 CAPSULE ORAL at 08:20

## 2019-08-15 RX ADMIN — MAGNESIUM OXIDE TAB 400 MG (241.3 MG ELEMENTAL MG) SCH MG: 400 (241.3 MG) TAB at 08:20

## 2019-08-15 RX ADMIN — MAGNESIUM OXIDE TAB 400 MG (241.3 MG ELEMENTAL MG) SCH MG: 400 (241.3 MG) TAB at 12:03

## 2019-08-15 RX ADMIN — TAMSULOSIN HYDROCHLORIDE SCH MG: 0.4 CAPSULE ORAL at 17:20

## 2019-08-15 RX ADMIN — Medication SCH MG: at 08:20

## 2019-08-15 RX ADMIN — MAGNESIUM OXIDE TAB 400 MG (241.3 MG ELEMENTAL MG) SCH MG: 400 (241.3 MG) TAB at 17:20

## 2019-08-15 RX ADMIN — VANCOMYCIN HYDROCHLORIDE SCH MG: 500 INJECTION, POWDER, LYOPHILIZED, FOR SOLUTION INTRAVENOUS at 17:20

## 2019-08-15 RX ADMIN — VANCOMYCIN HYDROCHLORIDE SCH MG: 500 INJECTION, POWDER, LYOPHILIZED, FOR SOLUTION INTRAVENOUS at 13:33

## 2019-08-15 RX ADMIN — VANCOMYCIN HYDROCHLORIDE SCH MG: 500 INJECTION, POWDER, LYOPHILIZED, FOR SOLUTION INTRAVENOUS at 21:49

## 2019-08-15 RX ADMIN — ASPIRIN SCH MG: 81 TABLET, DELAYED RELEASE ORAL at 08:21

## 2019-08-15 NOTE — GENERAL PROGRESS NOTE
Assessment/Plan


Problem List:  


(1) Clostridium difficile colitis


ICD Codes:  A04.72 - Enterocolitis due to Clostridium difficile, not specified 

as recurrent


SNOMED:  190501000


(2) Urinary tract infection


ICD Codes:  N39.0 - Urinary tract infection, site not specified


SNOMED:  08252966


(3) Bladder calculi


ICD Codes:  N21.0 - Calculus in bladder


SNOMED:  54097989


(4) Renal insufficiency


ICD Codes:  N28.9 - Disorder of kidney and ureter, unspecified


SNOMED:  697397047, 018494477


(5) Bilateral renal stones


ICD Codes:  N20.0 - Calculus of kidney


SNOMED:  73214864


(6) Hydronephrosis


ICD Codes:  N13.30 - Unspecified hydronephrosis


SNOMED:  63767596


(7) Staghorn calculus


ICD Codes:  N20.0 - Calculus of kidney


SNOMED:  277143440


(8) KENYETTA (acute kidney injury)


ICD Codes:  N17.9 - Acute kidney failure, unspecified


SNOMED:  5769899, 63599177


(9) Hypomagnesemia


ICD Codes:  E83.42 - Hypomagnesemia


SNOMED:  573540742


Status:  stable, progressing


Assessment/Plan:


cont current rx


ivf


pierre


start po naco


questran for diarrhea


monitor labs





Subjective


ROS Limited/Unobtainable:  No


Constitutional:  Reports: malaise, weakness


HEENT:  Reports: no symptoms


Cardiovascular:  Reports: no symptoms


Respiratory:  Reports: no symptoms


Gastrointestinal/Abdominal:  Reports: diarrhea


Genitourinary:  Reports: no symptoms


Neurologic/Psychiatric:  Reports: no symptoms


Endocrine:  Reports: no symptoms


Hematologic/Lymphatic:  Reports: no symptoms


Allergies:  


Coded Allergies:  


     No Known Allergies (Unverified , 7/23/19)


All Systems:  reviewed and negative except above


Subjective


+diarrhea. Cdiff positive. otherwise no complaints.  noted.





Objective





Last 24 Hour Vital Signs








  Date Time  Temp Pulse Resp B/P (MAP) Pulse Ox O2 Delivery O2 Flow Rate FiO2


 


8/15/19 12:00 98.4 80 18 128/74 (92) 98   


 


8/15/19 09:00      Room Air  


 


8/15/19 08:21  68  125/72    


 


8/15/19 08:00 98.4 73 19 140/83 (102) 94   


 


8/15/19 04:00 98.1 68 20 125/72 (89)    


 


8/15/19 00:00 98.1 72 18 129/65 (86)    


 


8/14/19 21:11      Room Air  


 


8/14/19 20:00 98.1 65 20 129/87 (101)    

















Intake and Output  


 


 8/14/19 8/15/19





 19:00 07:00


 


Intake Total 860 ml 


 


Balance 860 ml 


 


  


 


Intake Oral 360 ml 


 


IV Total 500 ml 


 


# Bowel Movements 2 1








Laboratory Tests


8/15/19 05:57: 


White Blood Count 7.1, Red Blood Count 4.05L, Hemoglobin 11.9L, Hematocrit 35.9L

, Mean Corpuscular Volume 89, Mean Corpuscular Hemoglobin 29.5, Mean 

Corpuscular Hemoglobin Concent 33.2, Red Cell Distribution Width 13.3, Platelet 

Count 216, Mean Platelet Volume 6.2L, Neutrophils (%) (Auto) , Lymphocytes (%) (

Auto) , Monocytes (%) (Auto) , Eosinophils (%) (Auto) , Basophils (%) (Auto) , 

Differential Total Cells Counted 100, Neutrophils % (Manual) 46, Lymphocytes % (

Manual) 29, Monocytes % (Manual) 16H, Eosinophils % (Manual) 8H, Basophils % (

Manual) 1, Band Neutrophils 0, Platelet Estimate Adequate, Platelet Morphology 

Normal, Hypochromasia 1+, Anisocytosis 1+, Sodium Level 137, Potassium Level 3.7

, Chloride Level 104, Carbon Dioxide Level 25, Anion Gap 8, Blood Urea Nitrogen 

13, Creatinine 1.2, Estimat Glomerular Filtration Rate > 60, Glucose Level 92, 

Uric Acid 6.0, Calcium Level 8.6, Magnesium Level 1.8, Total Bilirubin 0.5, 

Aspartate Amino Transf (AST/SGOT) 38H, Alanine Aminotransferase (ALT/SGPT) 42, 

Alkaline Phosphatase 72, Total Protein 6.6, Albumin 2.5L, Globulin 4.1, Albumin/

Globulin Ratio 0.6L


Height (Feet):  5


Height (Inches):  10.00


Weight (Pounds):  184


General Appearance:  WD/WN


Cardiovascular:  regular rhythm


Respiratory/Chest:  lungs clear


Abdomen:  normal bowel sounds, non tender, soft, no organomegaly, no mass


Edema:  no edema noted Arm (L), no edema noted Arm (R), no edema noted Leg (L), 

no edema noted Leg (R), no edema noted Pedal (L), no edema noted Pedal (R), no 

edema noted Generalized











Zackery Fletcher MD Aug 15, 2019 16:30

## 2019-08-15 NOTE — UROLOGY PROGRESS NOTE
Assessment/Plan


Assessment/Plan:


1. Staghorn renal calculus.


2. Bladder calculus.


3. Mild hydronephrosis, likely chronic.


4. Chronic kidney disease.


5. Acute kidney injury.


6. Hematuria.


7. Pyuria, poss UTI.


8. Urinary retention.


9. Neurogenic bladder.


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





monitor clinically


pierre out and voiding


monitor renal fxn, labile but improved


s/p abx course, now off


cont flomax BID


proscar added


pt with very significant and complex stone burden


will likely need multiple complicated urologic procedures with close follow up


best course of action would be higher [facility] of care at tertiary care facility





Subjective


Allergies:  


Coded Allergies:  


     No Known Allergies (Unverified , 7/23/19)


Subjective


all noted, voiding, looks comfortable





Objective





Last 24 Hour Vital Signs








  Date Time  Temp Pulse Resp B/P (MAP) Pulse Ox O2 Delivery O2 Flow Rate FiO2


 


8/15/19 04:00 98.1 68 20 125/72 (89)    


 


8/15/19 00:00 98.1 72 18 129/65 (86)    


 


8/14/19 21:11      Room Air  


 


8/14/19 20:00 98.1 65 20 129/87 (101)    


 


8/14/19 16:00 98.0 83 18 135/58 (83) 98   


 


8/14/19 12:00 97.9 71 18 105/56 (72) 98   


 


8/14/19 09:00      Room Air  


 


8/14/19 08:37  66  152/78    

















Intake and Output  


 


 8/14/19 8/15/19





 19:00 07:00


 


Intake Total 860 ml 


 


Balance 860 ml 


 


  


 


Intake Oral 360 ml 


 


IV Total 500 ml 


 


# Bowel Movements 2 1











Microbiology








 Date/Time


Source Procedure


Growth Status


 


 


 7/23/19 14:50


Nasal Nares MRSA Culture - Final


Staphylococcus Aureus - Mrsa Complete


 


 7/25/19 04:45


Stool Clostridium difficile Toxin Assay - Final Complete


 


 8/8/19 23:00


Urine,Clean Catch Urine Culture - Final


NO GROWTH AFTER 48 HOURS Complete


 


 7/23/19 14:50


Rectum VRE Culture - Final


NO VANCOMYCIN RESISTANT ENTEROCOCCUS ... Complete








Current Medications








 Medications


  (Trade)  Dose


 Ordered  Sig/Lul


 Route


 PRN Reason  Start Time


 Stop Time Status Last Admin


Dose Admin


 


 Amlodipine


 Besylate


  (Norvasc)  10 mg  DAILY


 ORAL


   7/27/19 09:00


 8/26/19 08:59  8/14/19 08:37


 


 


 Aspirin


  (Ecotrin)  81 mg  DAILY


 ORAL


   7/27/19 09:00


 8/23/19 08:59  8/14/19 08:37


 


 


 Escitalopram


 Oxalate


  (Lexapro)  10 mg  DAILY


 ORAL


   8/2/19 09:00


 9/1/19 08:59  8/14/19 08:37


 


 


 Finasteride


  (Proscar)  5 mg  DAILY


 ORAL


   8/12/19 09:00


 9/11/19 08:59  8/14/19 08:37


 


 


 Folic Acid


  (Folate)  1 mg  DAILY


 ORAL


   7/27/19 09:00


 8/23/19 08:59  8/14/19 08:37


 


 


 Magnesium Oxide


  (Mag-Ox 400mg)  400 mg  THREE TIMES A  DAY


 ORAL


   7/30/19 13:00


 8/29/19 12:59  8/14/19 17:28


 


 


 Multivitamins


  (Multivitamins)  1 tab  DAILY


 ORAL


   7/27/19 09:00


 8/23/19 08:59  8/14/19 08:37


 


 


 Risperidone


  (RisperDAL)  2 mg  BEDTIME


 ORAL


   8/9/19 21:00


 9/2/19 20:59  8/14/19 20:57


 


 


 Tamsulosin HCl


  (Flomax)  0.4 mg  BID


 ORAL


   7/27/19 09:00


 8/22/19 19:00  8/14/19 17:28


 


 


 Thiamine HCl


  (Vitamin B1)  100 mg  DAILY


 ORAL


   8/2/19 09:00


 9/1/19 08:59  8/14/19 08:36


 


 


 Tramadol HCl


  (Ultram)  50 mg  Q6H  PRN


 ORAL


 For Pain  8/9/19 13:45


 8/16/19 13:44  8/12/19 14:00


 





Laboratory Tests


8/15/19 05:57: 


White Blood Count 7.1, Red Blood Count 4.05L, Hemoglobin 11.9L, Hematocrit 35.9L

, Mean Corpuscular Volume 89, Mean Corpuscular Hemoglobin 29.5, Mean 

Corpuscular Hemoglobin Concent 33.2, Red Cell Distribution Width 13.3, Platelet 

Count 216, Mean Platelet Volume 6.2L, Neutrophils (%) (Auto) , Lymphocytes (%) (

Auto) , Monocytes (%) (Auto) , Eosinophils (%) (Auto) , Basophils (%) (Auto) , 

Neutrophils % (Manual) [Pending], Lymphocytes % (Manual) [Pending], Sodium 

Level 137, Potassium Level 3.7, Chloride Level 104, Carbon Dioxide Level 25, 

Anion Gap 8, Blood Urea Nitrogen 13, Creatinine 1.2, Estimat Glomerular 

Filtration Rate > 60, Glucose Level 92, Uric Acid 6.0, Calcium Level 8.6, 

Magnesium Level 1.8, Total Bilirubin 0.5, Aspartate Amino Transf (AST/SGOT) 38H

, Alanine Aminotransferase (ALT/SGPT) 42, Alkaline Phosphatase 72, Total 

Protein 6.6, Albumin 2.5L, Globulin 4.1, Albumin/Globulin Ratio 0.6L


Height (Feet):  5


Height (Inches):  10.00


Weight (Pounds):  184


Objective


 exam stable





last renal u/s negative for hydro











RichardshIsrael shell MD Aug 15, 2019 08:06

## 2019-08-16 VITALS — SYSTOLIC BLOOD PRESSURE: 133 MMHG | DIASTOLIC BLOOD PRESSURE: 69 MMHG

## 2019-08-16 VITALS — SYSTOLIC BLOOD PRESSURE: 119 MMHG | DIASTOLIC BLOOD PRESSURE: 79 MMHG

## 2019-08-16 VITALS — DIASTOLIC BLOOD PRESSURE: 65 MMHG | SYSTOLIC BLOOD PRESSURE: 126 MMHG

## 2019-08-16 VITALS — SYSTOLIC BLOOD PRESSURE: 130 MMHG | DIASTOLIC BLOOD PRESSURE: 75 MMHG

## 2019-08-16 VITALS — SYSTOLIC BLOOD PRESSURE: 119 MMHG | DIASTOLIC BLOOD PRESSURE: 84 MMHG

## 2019-08-16 VITALS — SYSTOLIC BLOOD PRESSURE: 118 MMHG | DIASTOLIC BLOOD PRESSURE: 61 MMHG

## 2019-08-16 LAB
ADD MANUAL DIFF: NO
ALBUMIN SERPL-MCNC: 2.6 G/DL (ref 3.4–5)
ALBUMIN/GLOB SERPL: 0.6 {RATIO} (ref 1–2.7)
ALP SERPL-CCNC: 75 U/L (ref 46–116)
ALT SERPL-CCNC: 42 U/L (ref 12–78)
ANION GAP SERPL CALC-SCNC: 8 MMOL/L (ref 5–15)
AST SERPL-CCNC: 38 U/L (ref 15–37)
BASOPHILS NFR BLD AUTO: 1.7 % (ref 0–2)
BILIRUB SERPL-MCNC: 0.5 MG/DL (ref 0.2–1)
BUN SERPL-MCNC: 10 MG/DL (ref 7–18)
CALCIUM SERPL-MCNC: 8.7 MG/DL (ref 8.5–10.1)
CHLORIDE SERPL-SCNC: 104 MMOL/L (ref 98–107)
CO2 SERPL-SCNC: 26 MMOL/L (ref 21–32)
CREAT SERPL-MCNC: 1.2 MG/DL (ref 0.55–1.3)
EOSINOPHIL NFR BLD AUTO: 6 % (ref 0–3)
ERYTHROCYTE [DISTWIDTH] IN BLOOD BY AUTOMATED COUNT: 13.1 % (ref 11.6–14.8)
GLOBULIN SER-MCNC: 4.3 G/DL
HCT VFR BLD CALC: 37.4 % (ref 42–52)
HGB BLD-MCNC: 12.5 G/DL (ref 14.2–18)
LYMPHOCYTES NFR BLD AUTO: 17.9 % (ref 20–45)
MCV RBC AUTO: 89 FL (ref 80–99)
MONOCYTES NFR BLD AUTO: 15.4 % (ref 1–10)
NEUTROPHILS NFR BLD AUTO: 59 % (ref 45–75)
PLATELET # BLD: 232 K/UL (ref 150–450)
POTASSIUM SERPL-SCNC: 3.4 MMOL/L (ref 3.5–5.1)
RBC # BLD AUTO: 4.22 M/UL (ref 4.7–6.1)
SODIUM SERPL-SCNC: 138 MMOL/L (ref 136–145)
WBC # BLD AUTO: 7.7 K/UL (ref 4.8–10.8)

## 2019-08-16 RX ADMIN — MAGNESIUM OXIDE TAB 400 MG (241.3 MG ELEMENTAL MG) SCH MG: 400 (241.3 MG) TAB at 08:59

## 2019-08-16 RX ADMIN — ASPIRIN SCH MG: 81 TABLET, DELAYED RELEASE ORAL at 08:59

## 2019-08-16 RX ADMIN — TAMSULOSIN HYDROCHLORIDE SCH MG: 0.4 CAPSULE ORAL at 09:00

## 2019-08-16 RX ADMIN — Medication SCH MG: at 09:00

## 2019-08-16 RX ADMIN — VANCOMYCIN HYDROCHLORIDE SCH MG: 500 INJECTION, POWDER, LYOPHILIZED, FOR SOLUTION INTRAVENOUS at 20:05

## 2019-08-16 RX ADMIN — MAGNESIUM OXIDE TAB 400 MG (241.3 MG ELEMENTAL MG) SCH MG: 400 (241.3 MG) TAB at 17:27

## 2019-08-16 RX ADMIN — TAMSULOSIN HYDROCHLORIDE SCH MG: 0.4 CAPSULE ORAL at 17:27

## 2019-08-16 RX ADMIN — VANCOMYCIN HYDROCHLORIDE SCH MG: 500 INJECTION, POWDER, LYOPHILIZED, FOR SOLUTION INTRAVENOUS at 09:52

## 2019-08-16 RX ADMIN — MAGNESIUM OXIDE TAB 400 MG (241.3 MG ELEMENTAL MG) SCH MG: 400 (241.3 MG) TAB at 13:16

## 2019-08-16 RX ADMIN — VANCOMYCIN HYDROCHLORIDE SCH MG: 500 INJECTION, POWDER, LYOPHILIZED, FOR SOLUTION INTRAVENOUS at 13:15

## 2019-08-16 RX ADMIN — VANCOMYCIN HYDROCHLORIDE SCH MG: 500 INJECTION, POWDER, LYOPHILIZED, FOR SOLUTION INTRAVENOUS at 17:28

## 2019-08-16 NOTE — UROLOGY PROGRESS NOTE
Assessment/Plan


Status:  stable, progressing


Assessment/Plan:


1. Staghorn renal calculus.


2. Bladder calculus.


3. Mild hydronephrosis, likely chronic.


4. Chronic kidney disease.


5. Acute kidney injury.


6. Hematuria.


7. Pyuria, poss UTI.


8. Urinary retention.


9. Neurogenic bladder.


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





monitor clinically


pierre out and voiding


monitor renal fxn, labile but improved


s/p abx course, now off


cont flomax BID


proscar added


pt with very significant and complex stone burden


will likely need multiple complicated urologic procedures with close follow up


best course of action would be higher [facility] of care at tertiary care facility





Subjective


Allergies:  


Coded Allergies:  


     No Known Allergies (Unverified , 7/23/19)


Subjective


all noted, voiding, looks comfortable





Objective





Last 24 Hour Vital Signs








  Date Time  Temp Pulse Resp B/P (MAP) Pulse Ox O2 Delivery O2 Flow Rate FiO2


 


8/16/19 04:51 97.9  17 118/61 (80)    


 


8/16/19 00:33 98.4  18 119/79 (92)    


 


8/15/19 21:00      Room Air  


 


8/15/19 20:00 98.1  20 129/87 (101)    


 


8/15/19 16:00 99.0 78 19 133/78 (96) 98   


 


8/15/19 12:00 98.4 80 18 128/74 (92) 98   


 


8/15/19 09:00      Room Air  


 


8/15/19 08:21  68  125/72    

















Intake and Output  


 


 8/15/19 8/16/19





 18:59 06:59


 


Intake Total 1340 ml 100 ml


 


Balance 1340 ml 100 ml


 


  


 


Intake Oral 840 ml 


 


IV Total 500 ml 100 ml


 


# Voids 5 2


 


# Bowel Movements 6 3











Microbiology








 Date/Time


Source Procedure


Growth Status


 


 


 7/23/19 14:50


Nasal Nares MRSA Culture - Final


Staphylococcus Aureus - Mrsa Complete


 


 7/25/19 04:45


Stool Clostridium difficile Toxin Assay - Final Complete


 


 8/8/19 23:00


Urine,Clean Catch Urine Culture - Final


NO GROWTH AFTER 48 HOURS Complete


 


 7/23/19 14:50


Rectum VRE Culture - Final


NO VANCOMYCIN RESISTANT ENTEROCOCCUS ... Complete








Current Medications








 Medications


  (Trade)  Dose


 Ordered  Sig/Lul


 Route


 PRN Reason  Start Time


 Stop Time Status Last Admin


Dose Admin


 


 Amlodipine


 Besylate


  (Norvasc)  10 mg  DAILY


 ORAL


   7/27/19 09:00


 8/26/19 08:59  8/15/19 08:21


 


 


 Aspirin


  (Ecotrin)  81 mg  DAILY


 ORAL


   7/27/19 09:00


 8/23/19 08:59  8/15/19 08:21


 


 


 Cholestyramine


 Resin


  (Questran)  4 gm  TIDPRN  PRN


 ORAL


 Diarrhea  8/15/19 12:15


 9/14/19 12:14   


 


 


 Escitalopram


 Oxalate


  (Lexapro)  10 mg  DAILY


 ORAL


   8/2/19 09:00


 9/1/19 08:59  8/15/19 08:20


 


 


 Finasteride


  (Proscar)  5 mg  DAILY


 ORAL


   8/12/19 09:00


 9/11/19 08:59  8/15/19 08:20


 


 


 Folic Acid


  (Folate)  1 mg  DAILY


 ORAL


   7/27/19 09:00


 8/23/19 08:59  8/15/19 08:20


 


 


 Magnesium Oxide


  (Mag-Ox 400mg)  400 mg  THREE TIMES A  DAY


 ORAL


   7/30/19 13:00


 8/29/19 12:59  8/15/19 17:20


 


 


 Multivitamins


  (Multivitamins)  1 tab  DAILY


 ORAL


   7/27/19 09:00


 8/23/19 08:59  8/15/19 08:20


 


 


 Risperidone


  (RisperDAL)  2 mg  BEDTIME


 ORAL


   8/9/19 21:00


 9/2/19 20:59  8/15/19 21:49


 


 


 Sodium Chloride  1,000 ml @ 


 100 mls/hr  Q10H


 IV


   8/15/19 12:17


 9/14/19 12:16  8/15/19 13:34


 


 


 Tamsulosin HCl


  (Flomax)  0.4 mg  BID


 ORAL


   7/27/19 09:00


 8/22/19 19:00  8/15/19 17:20


 


 


 Thiamine HCl


  (Vitamin B1)  100 mg  DAILY


 ORAL


   8/2/19 09:00


 9/1/19 08:59  8/15/19 08:20


 


 


 Tramadol HCl


  (Ultram)  50 mg  Q6H  PRN


 ORAL


 For Pain  8/9/19 13:45


 8/16/19 13:44  8/12/19 14:00


 


 


 Vancomycin HCl


  (Firvanq)  250 mg  FOUR TIMES A  DAY


 ORAL


   8/15/19 13:00


 8/22/19 12:59  8/15/19 21:49


 





Laboratory Tests


8/16/19 07:35: 


White Blood Count [Pending], Red Blood Count [Pending], Hemoglobin [Pending], 

Hematocrit [Pending], Mean Corpuscular Volume [Pending], Mean Corpuscular 

Hemoglobin [Pending], Mean Corpuscular Hemoglobin Concent [Pending], Red Cell 

Distribution Width [Pending], Platelet Count [Pending], Mean Platelet Volume [

Pending], Neutrophils (%) (Auto) [Pending], Lymphocytes (%) (Auto) [Pending], 

Monocytes (%) (Auto) [Pending], Eosinophils (%) (Auto) [Pending], Basophils (%) 

(Auto) [Pending], Sodium Level [Pending], Potassium Level [Pending], Chloride 

Level [Pending], Carbon Dioxide Level [Pending], Blood Urea Nitrogen [Pending], 

Creatinine [Pending], Estimat Glomerular Filtration Rate [Pending], Glucose 

Level [Pending], Calcium Level [Pending], Total Bilirubin [Pending], Aspartate 

Amino Transf (AST/SGOT) [Pending], Alanine Aminotransferase (ALT/SGPT) [Pending]

, Alkaline Phosphatase [Pending], Total Protein [Pending], Albumin [Pending], 

Globulin [Pending]


Height (Feet):  5


Height (Inches):  10.00


Weight (Pounds):  184


Objective


 exam stable





last renal u/s negative for hydro











BamshadIsrael MD Aug 16, 2019 08:10

## 2019-08-16 NOTE — GENERAL PROGRESS NOTE
Assessment/Plan


Problem List:  


(1) Clostridium difficile colitis


ICD Codes:  A04.72 - Enterocolitis due to Clostridium difficile, not specified 

as recurrent


SNOMED:  443298763


(2) Urinary tract infection


ICD Codes:  N39.0 - Urinary tract infection, site not specified


SNOMED:  29108624


(3) Bladder calculi


ICD Codes:  N21.0 - Calculus in bladder


SNOMED:  48148440


(4) Renal insufficiency


ICD Codes:  N28.9 - Disorder of kidney and ureter, unspecified


SNOMED:  842811994, 103456532


(5) Bilateral renal stones


ICD Codes:  N20.0 - Calculus of kidney


SNOMED:  64257660


(6) Hydronephrosis


ICD Codes:  N13.30 - Unspecified hydronephrosis


SNOMED:  30617309


(7) Staghorn calculus


ICD Codes:  N20.0 - Calculus of kidney


SNOMED:  956005050


(8) KENYETTA (acute kidney injury)


ICD Codes:  N17.9 - Acute kidney failure, unspecified


SNOMED:  2981577, 87376987


(9) Hypomagnesemia


ICD Codes:  E83.42 - Hypomagnesemia


SNOMED:  636261636


Status:  stable, progressing


Assessment/Plan:


cont current rx


ivf decreased


pierre


start po vanco


questran for diarrhea


monitor labs





Subjective


ROS Limited/Unobtainable:  No


Constitutional:  Reports: malaise, weakness


HEENT:  Reports: no symptoms


Cardiovascular:  Reports: no symptoms


Respiratory:  Reports: no symptoms


Gastrointestinal/Abdominal:  Reports: diarrhea


Genitourinary:  Reports: no symptoms


Neurologic/Psychiatric:  Reports: no symptoms


Endocrine:  Reports: no symptoms


Hematologic/Lymphatic:  Reports: no symptoms


Allergies:  


Coded Allergies:  


     No Known Allergies (Unverified , 7/23/19)


All Systems:  reviewed and negative except above


Subjective


+diarrhea but less. Cdiff positive. otherwise no complaints.  noted. 


refusing iv placement. no abd pain. no dysuria





Objective





Last 24 Hour Vital Signs








  Date Time  Temp Pulse Resp B/P (MAP) Pulse Ox O2 Delivery O2 Flow Rate FiO2


 


8/16/19 12:00 97.6 82 18 119/84 (96) 94   


 


8/16/19 09:00      Room Air  


 


8/16/19 08:59  85  126/65    


 


8/16/19 08:00 97.9 85 18 126/65 (85) 97   


 


8/16/19 04:51 97.9  17 118/61 (80)    


 


8/16/19 00:33 98.4  18 119/79 (92)    


 


8/15/19 21:00      Room Air  


 


8/15/19 20:00 98.1  20 129/87 (101)    

















Intake and Output  


 


 8/15/19 8/16/19





 19:00 07:00


 


Intake Total 1340 ml 100 ml


 


Balance 1340 ml 100 ml


 


  


 


Intake Oral 840 ml 


 


IV Total 500 ml 100 ml


 


# Voids 5 2


 


# Bowel Movements 6 3








Laboratory Tests


8/16/19 07:35: 


White Blood Count 7.7, Red Blood Count 4.22L, Hemoglobin 12.5L, Hematocrit 37.4L

, Mean Corpuscular Volume 89, Mean Corpuscular Hemoglobin 29.6, Mean 

Corpuscular Hemoglobin Concent 33.4, Red Cell Distribution Width 13.1, Platelet 

Count 232, Mean Platelet Volume 6.0L, Neutrophils (%) (Auto) 59.0, Lymphocytes (

%) (Auto) 17.9L, Monocytes (%) (Auto) 15.4H, Eosinophils (%) (Auto) 6.0H, 

Basophils (%) (Auto) 1.7, Sodium Level 138, Potassium Level 3.4L, Chloride 

Level 104, Carbon Dioxide Level 26, Anion Gap 8, Blood Urea Nitrogen 10, 

Creatinine 1.2, Estimat Glomerular Filtration Rate > 60, Glucose Level 94, 

Calcium Level 8.7, Total Bilirubin 0.5, Aspartate Amino Transf (AST/SGOT) 38H, 

Alanine Aminotransferase (ALT/SGPT) 42, Alkaline Phosphatase 75, Total Protein 

6.9, Albumin 2.6L, Globulin 4.3, Albumin/Globulin Ratio 0.6L


Height (Feet):  5


Height (Inches):  10.00


Weight (Pounds):  184


General Appearance:  WD/WN, alert


Neck:  supple


Cardiovascular:  regular rhythm


Respiratory/Chest:  lungs clear


Abdomen:  normal bowel sounds, non tender, soft, no organomegaly


Edema:  no edema noted Arm (L), no edema noted Arm (R), no edema noted Leg (L), 

no edema noted Leg (R), no edema noted Pedal (L), no edema noted Pedal (R), no 

edema noted Generalized











Zackery Fletcher MD Aug 16, 2019 16:15

## 2019-08-17 VITALS — DIASTOLIC BLOOD PRESSURE: 87 MMHG | SYSTOLIC BLOOD PRESSURE: 165 MMHG

## 2019-08-17 VITALS — DIASTOLIC BLOOD PRESSURE: 69 MMHG | SYSTOLIC BLOOD PRESSURE: 137 MMHG

## 2019-08-17 VITALS — DIASTOLIC BLOOD PRESSURE: 74 MMHG | SYSTOLIC BLOOD PRESSURE: 130 MMHG

## 2019-08-17 VITALS — SYSTOLIC BLOOD PRESSURE: 134 MMHG | DIASTOLIC BLOOD PRESSURE: 70 MMHG

## 2019-08-17 VITALS — DIASTOLIC BLOOD PRESSURE: 71 MMHG | SYSTOLIC BLOOD PRESSURE: 135 MMHG

## 2019-08-17 VITALS — SYSTOLIC BLOOD PRESSURE: 136 MMHG | DIASTOLIC BLOOD PRESSURE: 82 MMHG

## 2019-08-17 RX ADMIN — MAGNESIUM OXIDE TAB 400 MG (241.3 MG ELEMENTAL MG) SCH MG: 400 (241.3 MG) TAB at 17:23

## 2019-08-17 RX ADMIN — TAMSULOSIN HYDROCHLORIDE SCH MG: 0.4 CAPSULE ORAL at 08:38

## 2019-08-17 RX ADMIN — ASPIRIN SCH MG: 81 TABLET, DELAYED RELEASE ORAL at 08:37

## 2019-08-17 RX ADMIN — TAMSULOSIN HYDROCHLORIDE SCH MG: 0.4 CAPSULE ORAL at 17:23

## 2019-08-17 RX ADMIN — VANCOMYCIN HYDROCHLORIDE SCH MG: 500 INJECTION, POWDER, LYOPHILIZED, FOR SOLUTION INTRAVENOUS at 08:35

## 2019-08-17 RX ADMIN — CHOLESTYRAMINE PRN GM: 4 POWDER, FOR SUSPENSION ORAL at 08:44

## 2019-08-17 RX ADMIN — VANCOMYCIN HYDROCHLORIDE SCH MG: 500 INJECTION, POWDER, LYOPHILIZED, FOR SOLUTION INTRAVENOUS at 20:07

## 2019-08-17 RX ADMIN — VANCOMYCIN HYDROCHLORIDE SCH MG: 500 INJECTION, POWDER, LYOPHILIZED, FOR SOLUTION INTRAVENOUS at 17:23

## 2019-08-17 RX ADMIN — Medication SCH MG: at 08:37

## 2019-08-17 RX ADMIN — MAGNESIUM OXIDE TAB 400 MG (241.3 MG ELEMENTAL MG) SCH MG: 400 (241.3 MG) TAB at 08:37

## 2019-08-17 RX ADMIN — VANCOMYCIN HYDROCHLORIDE SCH MG: 500 INJECTION, POWDER, LYOPHILIZED, FOR SOLUTION INTRAVENOUS at 13:27

## 2019-08-17 RX ADMIN — MAGNESIUM OXIDE TAB 400 MG (241.3 MG ELEMENTAL MG) SCH MG: 400 (241.3 MG) TAB at 13:27

## 2019-08-17 NOTE — GENERAL PROGRESS NOTE
Assessment/Plan


Problem List:  


(1) Clostridium difficile colitis


ICD Codes:  A04.72 - Enterocolitis due to Clostridium difficile, not specified 

as recurrent


SNOMED:  063636332


(2) Urinary tract infection


ICD Codes:  N39.0 - Urinary tract infection, site not specified


SNOMED:  29164981


(3) Bladder calculi


ICD Codes:  N21.0 - Calculus in bladder


SNOMED:  16609143


(4) Renal insufficiency


ICD Codes:  N28.9 - Disorder of kidney and ureter, unspecified


SNOMED:  986999972, 696615048


(5) Bilateral renal stones


ICD Codes:  N20.0 - Calculus of kidney


SNOMED:  03149340


(6) Hydronephrosis


ICD Codes:  N13.30 - Unspecified hydronephrosis


SNOMED:  16585085


(7) Staghorn calculus


ICD Codes:  N20.0 - Calculus of kidney


SNOMED:  353746911


(8) KENYETTA (acute kidney injury)


ICD Codes:  N17.9 - Acute kidney failure, unspecified


SNOMED:  1867021, 17727331


(9) Hypomagnesemia


ICD Codes:  E83.42 - Hypomagnesemia


SNOMED:  148962678


Status:  stable, progressing


Assessment/Plan:


cont current rx


ivf decreased


pierre


start po vanco


questran for diarrhea


monitor labs





Subjective


ROS Limited/Unobtainable:  No


Constitutional:  Reports: malaise, weakness


HEENT:  Reports: no symptoms


Cardiovascular:  Reports: no symptoms


Respiratory:  Reports: cough


Gastrointestinal/Abdominal:  Reports: no symptoms


Genitourinary:  Reports: no symptoms


Neurologic/Psychiatric:  Reports: emotional problems


Endocrine:  Reports: no symptoms


Hematologic/Lymphatic:  Reports: no symptoms


Allergies:  


Coded Allergies:  


     No Known Allergies (Unverified , 7/23/19)


All Systems:  reviewed and negative except above


Subjective


no events. no new complaints. per pt diarrhea better. remains on ivf. states 

hes homeless





Objective





Last 24 Hour Vital Signs








  Date Time  Temp Pulse Resp B/P (MAP) Pulse Ox O2 Delivery O2 Flow Rate FiO2


 


8/17/19 12:00 97.6 85 16 134/70 (91) 99   


 


8/17/19 09:00      Room Air  


 


8/17/19 08:39  68  130/74    


 


8/17/19 08:00 98.6 68 19 130/74 (92) 97   


 


8/17/19 04:00 98.3 65 19 135/71 (92) 96   


 


8/17/19 00:00 97.6 63 18 137/69 (91) 95   


 


8/16/19 21:00      Room Air  


 


8/16/19 20:00 98.6 72 22 130/75 (93) 94   


 


8/16/19 16:00 98.6 76 18 133/69 (90) 98   

















Intake and Output  


 


 8/16/19 8/17/19





 19:00 07:00


 


Intake Total 1060 ml 830 ml


 


Balance 1060 ml 830 ml


 


  


 


Intake Oral 960 ml 480 ml


 


IV Total 100 ml 350 ml


 


# Voids 3 


 


# Bowel Movements 1 








Height (Feet):  5


Height (Inches):  10.00


Weight (Pounds):  184


Objective


General Appearance:  WD/WN, alert


Neck:  supple


Cardiovascular:  regular rhythm


Respiratory/Chest:  lungs clear


Abdomen:  normal bowel sounds, non tender, soft, no organomegaly


Edema:  no edema noted Arm (L), no edema noted Arm (R), no edema noted Leg (L), 

no edema noted Leg (R), no edema noted Pedal (L), no edema noted Pedal (R), no 

edema noted Generalized











Zackery Fletcher MD Aug 17, 2019 13:16

## 2019-08-17 NOTE — UROLOGY PROGRESS NOTE
Assessment/Plan


Status:  stable, progressing


Assessment/Plan:


1. Staghorn renal calculus.


2. Bladder calculus.


3. Mild hydronephrosis, likely chronic.


4. Chronic kidney disease.


5. Acute kidney injury.


6. Hematuria.


7. Pyuria, poss UTI.


8. Urinary retention.


9. Neurogenic bladder.


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





monitor clinically


pierre out and voiding


monitor renal fxn, labile but improved


s/p IV abx course, currently on PO Vanco


cont flomax BID


proscar added


pt with very significant and complex stone burden


will likely need multiple complicated urologic procedures with close follow up


best course of action would be higher [facility] of care at tertiary care facility





Subjective


Allergies:  


Coded Allergies:  


     No Known Allergies (Unverified , 7/23/19)


Subjective


all noted, voiding, looks comfortable





Objective





Last 24 Hour Vital Signs








  Date Time  Temp Pulse Resp B/P (MAP) Pulse Ox O2 Delivery O2 Flow Rate FiO2


 


8/17/19 16:00 98.7 74 20 136/82 (100) 98   


 


8/17/19 12:00 97.6 85 16 134/70 (91) 99   


 


8/17/19 09:00      Room Air  


 


8/17/19 08:39  68  130/74    


 


8/17/19 08:00 98.6 68 19 130/74 (92) 97   


 


8/17/19 04:00 98.3 65 19 135/71 (92) 96   


 


8/17/19 00:00 97.6 63 18 137/69 (91) 95   


 


8/16/19 21:00      Room Air  

















Intake and Output  


 


 8/16/19 8/17/19





 18:59 06:59


 


Intake Total 1060 ml 830 ml


 


Balance 1060 ml 830 ml


 


  


 


Intake Oral 960 ml 480 ml


 


IV Total 100 ml 350 ml


 


# Voids 3 


 


# Bowel Movements 1 











Microbiology








 Date/Time


Source Procedure


Growth Status


 


 


 7/23/19 14:50


Nasal Nares MRSA Culture - Final


Staphylococcus Aureus - Mrsa Complete


 


 7/25/19 04:45


Stool Clostridium difficile Toxin Assay - Final Complete


 


 8/8/19 23:00


Urine,Clean Catch Urine Culture - Final


NO GROWTH AFTER 48 HOURS Complete


 


 7/23/19 14:50


Rectum VRE Culture - Final


NO VANCOMYCIN RESISTANT ENTEROCOCCUS ... Complete








Current Medications








 Medications


  (Trade)  Dose


 Ordered  Sig/Lul


 Route


 PRN Reason  Start Time


 Stop Time Status Last Admin


Dose Admin


 


 Amlodipine


 Besylate


  (Norvasc)  10 mg  DAILY


 ORAL


   7/27/19 09:00


 8/26/19 08:59  8/17/19 08:39


 


 


 Aspirin


  (Ecotrin)  81 mg  DAILY


 ORAL


   7/27/19 09:00


 8/23/19 08:59  8/17/19 08:37


 


 


 Cholestyramine


 Resin


  (Questran)  4 gm  TIDPRN  PRN


 ORAL


 Diarrhea  8/15/19 12:15


 9/14/19 12:14  8/17/19 08:44


 


 


 Escitalopram


 Oxalate


  (Lexapro)  10 mg  DAILY


 ORAL


   8/2/19 09:00


 9/1/19 08:59  8/17/19 08:37


 


 


 Finasteride


  (Proscar)  5 mg  DAILY


 ORAL


   8/12/19 09:00


 9/11/19 08:59  8/17/19 08:38


 


 


 Folic Acid


  (Folate)  1 mg  DAILY


 ORAL


   7/27/19 09:00


 8/23/19 08:59  8/17/19 08:37


 


 


 Magnesium Oxide


  (Mag-Ox 400mg)  400 mg  THREE TIMES A  DAY


 ORAL


   7/30/19 13:00


 8/29/19 12:59  8/17/19 17:23


 


 


 Multivitamins


  (Multivitamins)  1 tab  DAILY


 ORAL


   7/27/19 09:00


 8/23/19 08:59  8/17/19 08:38


 


 


 Risperidone


  (RisperDAL)  2 mg  BEDTIME


 ORAL


   8/9/19 21:00


 9/2/19 20:59  8/17/19 20:07


 


 


 Sodium Chloride  1,000 ml @ 


 50 mls/hr  Q20H


 IV


   8/16/19 16:09


 9/15/19 16:08  8/17/19 13:27


 


 


 Tamsulosin HCl


  (Flomax)  0.4 mg  BID


 ORAL


   7/27/19 09:00


 8/22/19 19:00  8/17/19 17:23


 


 


 Thiamine HCl


  (Vitamin B1)  100 mg  DAILY


 ORAL


   8/2/19 09:00


 9/1/19 08:59  8/17/19 08:37


 


 


 Vancomycin HCl


  (Firvanq)  250 mg  FOUR TIMES A  DAY


 ORAL


   8/15/19 13:00


 8/22/19 12:59  8/17/19 20:07


 








Height (Feet):  5


Height (Inches):  10.00


Weight (Pounds):  184


Objective


 exam stable





last renal u/s negative for hydro











Israel Pink MD Aug 17, 2019 20:16

## 2019-08-18 VITALS — DIASTOLIC BLOOD PRESSURE: 92 MMHG | SYSTOLIC BLOOD PRESSURE: 159 MMHG

## 2019-08-18 VITALS — DIASTOLIC BLOOD PRESSURE: 78 MMHG | SYSTOLIC BLOOD PRESSURE: 119 MMHG

## 2019-08-18 VITALS — DIASTOLIC BLOOD PRESSURE: 93 MMHG | SYSTOLIC BLOOD PRESSURE: 115 MMHG

## 2019-08-18 VITALS — SYSTOLIC BLOOD PRESSURE: 138 MMHG | DIASTOLIC BLOOD PRESSURE: 71 MMHG

## 2019-08-18 VITALS — DIASTOLIC BLOOD PRESSURE: 90 MMHG | SYSTOLIC BLOOD PRESSURE: 142 MMHG

## 2019-08-18 VITALS — SYSTOLIC BLOOD PRESSURE: 155 MMHG | DIASTOLIC BLOOD PRESSURE: 78 MMHG

## 2019-08-18 RX ADMIN — VANCOMYCIN HYDROCHLORIDE SCH MG: 500 INJECTION, POWDER, LYOPHILIZED, FOR SOLUTION INTRAVENOUS at 08:14

## 2019-08-18 RX ADMIN — VANCOMYCIN HYDROCHLORIDE SCH MG: 500 INJECTION, POWDER, LYOPHILIZED, FOR SOLUTION INTRAVENOUS at 20:21

## 2019-08-18 RX ADMIN — TAMSULOSIN HYDROCHLORIDE SCH MG: 0.4 CAPSULE ORAL at 17:09

## 2019-08-18 RX ADMIN — Medication SCH MG: at 08:14

## 2019-08-18 RX ADMIN — MAGNESIUM OXIDE TAB 400 MG (241.3 MG ELEMENTAL MG) SCH MG: 400 (241.3 MG) TAB at 17:09

## 2019-08-18 RX ADMIN — VANCOMYCIN HYDROCHLORIDE SCH MG: 500 INJECTION, POWDER, LYOPHILIZED, FOR SOLUTION INTRAVENOUS at 17:09

## 2019-08-18 RX ADMIN — CHOLESTYRAMINE PRN GM: 4 POWDER, FOR SUSPENSION ORAL at 11:13

## 2019-08-18 RX ADMIN — MAGNESIUM OXIDE TAB 400 MG (241.3 MG ELEMENTAL MG) SCH MG: 400 (241.3 MG) TAB at 08:14

## 2019-08-18 RX ADMIN — MAGNESIUM OXIDE TAB 400 MG (241.3 MG ELEMENTAL MG) SCH MG: 400 (241.3 MG) TAB at 12:55

## 2019-08-18 RX ADMIN — TAMSULOSIN HYDROCHLORIDE SCH MG: 0.4 CAPSULE ORAL at 08:14

## 2019-08-18 RX ADMIN — VANCOMYCIN HYDROCHLORIDE SCH MG: 500 INJECTION, POWDER, LYOPHILIZED, FOR SOLUTION INTRAVENOUS at 12:55

## 2019-08-18 RX ADMIN — ASPIRIN SCH MG: 81 TABLET, DELAYED RELEASE ORAL at 08:14

## 2019-08-18 NOTE — GENERAL PROGRESS NOTE
Assessment/Plan


Problem List:  


(1) Clostridium difficile colitis


ICD Codes:  A04.72 - Enterocolitis due to Clostridium difficile, not specified 

as recurrent


SNOMED:  148314986


(2) Urinary tract infection


ICD Codes:  N39.0 - Urinary tract infection, site not specified


SNOMED:  54142001


(3) Bladder calculi


ICD Codes:  N21.0 - Calculus in bladder


SNOMED:  46260246


(4) Renal insufficiency


ICD Codes:  N28.9 - Disorder of kidney and ureter, unspecified


SNOMED:  181388769, 828120717


(5) Bilateral renal stones


ICD Codes:  N20.0 - Calculus of kidney


SNOMED:  94506197


(6) Hydronephrosis


ICD Codes:  N13.30 - Unspecified hydronephrosis


SNOMED:  11633568


(7) Staghorn calculus


ICD Codes:  N20.0 - Calculus of kidney


SNOMED:  724629576


(8) KENYETTA (acute kidney injury)


ICD Codes:  N17.9 - Acute kidney failure, unspecified


SNOMED:  6842422, 55790747


(9) Hypomagnesemia


ICD Codes:  E83.42 - Hypomagnesemia


SNOMED:  657970582


Status:  stable, progressing


Assessment/Plan:


cont current rx


ivf decreased


pierre


po vanco for cdiff


questran for diarrhea


monitor labs


probably needs placement





Subjective


ROS Limited/Unobtainable:  No


Constitutional:  Reports: malaise, weakness


HEENT:  Reports: no symptoms


Cardiovascular:  Reports: no symptoms


Respiratory:  Reports: cough


Gastrointestinal/Abdominal:  Reports: no symptoms


Genitourinary:  Reports: no symptoms


Neurologic/Psychiatric:  Reports: no symptoms, emotional problems


Endocrine:  Reports: no symptoms


Hematologic/Lymphatic:  Reports: anemia


Allergies:  


Coded Allergies:  


     No Known Allergies (Unverified , 7/23/19)


All Systems:  reviewed and negative except above


Subjective


no events. no new complaints. diarrhea better. no fever or chills.





Objective





Last 24 Hour Vital Signs








  Date Time  Temp Pulse Resp B/P (MAP) Pulse Ox O2 Delivery O2 Flow Rate FiO2


 


8/18/19 09:00      Room Air  


 


8/18/19 08:15  83  119/78    


 


8/18/19 08:00 98.3 83 20 119/78 (92) 94   


 


8/18/19 04:00 97.8 72 20 159/92 (114) 96   


 


8/18/19 00:00 97.0 80 19 138/71 (93) 97   


 


8/17/19 21:00      Room Air  


 


8/17/19 20:00 97.7 91 19 165/87 (113) 98   


 


8/17/19 16:00 98.7 74 20 136/82 (100) 98   


 


8/17/19 12:00 97.6 85 16 134/70 (91) 99   

















Intake and Output  


 


 8/17/19 8/18/19





 19:00 07:00


 


Intake Total 880 ml 1880 ml


 


Output Total  300 ml


 


Balance 880 ml 1580 ml


 


  


 


Intake Oral 880 ml 880 ml


 


IV Total  400 ml


 


Other  600 ml


 


Output Urine Total  300 ml


 


# Voids 8 3


 


# Bowel Movements 4 3








Height (Feet):  5


Height (Inches):  10.00


Weight (Pounds):  184


Objective


General Appearance:  WD/WN, alert


Neck:  supple


Cardiovascular:  regular rhythm


Respiratory/Chest:  lungs clear


Abdomen:  normal bowel sounds, non tender, soft, no organomegaly


Edema:  no edema noted Arm (L), no edema noted Arm (R), no edema noted Leg (L), 

no edema noted Leg (R), no edema noted Pedal (L), no edema noted Pedal (R), no 

edema noted Generalized











Zackery Fletcher MD Aug 18, 2019 10:59

## 2019-08-18 NOTE — UROLOGY PROGRESS NOTE
Assessment/Plan


Status:  stable, progressing


Assessment/Plan:


1. Staghorn renal calculus.


2. Bladder calculus.


3. Mild hydronephrosis, likely chronic.


4. Chronic kidney disease.


5. Acute kidney injury.


6. Hematuria.


7. Pyuria, poss UTI.


8. Urinary retention.


9. Neurogenic bladder.


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





monitor clinically


pierre out and voiding


monitor renal fxn, labile but improved


s/p IV abx course, currently on PO Vanco


cont flomax BID


proscar added


pt with very significant and complex stone burden


will likely need multiple complicated urologic procedures with close follow up


best course of action would be higher lever of care at tertiary care facility





Subjective


Allergies:  


Coded Allergies:  


     No Known Allergies (Unverified , 7/23/19)


Subjective


all noted, voiding, looks comfortable





Objective





Last 24 Hour Vital Signs








  Date Time  Temp Pulse Resp B/P (MAP) Pulse Ox O2 Delivery O2 Flow Rate FiO2


 


8/18/19 09:00      Room Air  


 


8/18/19 08:15  83  119/78    


 


8/18/19 08:00 98.3 83 20 119/78 (92) 94   


 


8/18/19 04:00 97.8 72 20 159/92 (114) 96   


 


8/18/19 00:00 97.0 80 19 138/71 (93) 97   


 


8/17/19 21:00      Room Air  


 


8/17/19 20:00 97.7 91 19 165/87 (113) 98   


 


8/17/19 16:00 98.7 74 20 136/82 (100) 98   


 


8/17/19 12:00 97.6 85 16 134/70 (91) 99   

















Intake and Output  


 


 8/17/19 8/18/19





 19:00 07:00


 


Intake Total 880 ml 1880 ml


 


Output Total  300 ml


 


Balance 880 ml 1580 ml


 


  


 


Intake Oral 880 ml 880 ml


 


IV Total  400 ml


 


Other  600 ml


 


Output Urine Total  300 ml


 


# Voids 8 3


 


# Bowel Movements 4 3











Microbiology








 Date/Time


Source Procedure


Growth Status


 


 


 7/23/19 14:50


Nasal Nares MRSA Culture - Final


Staphylococcus Aureus - Mrsa Complete


 


 7/25/19 04:45


Stool Clostridium difficile Toxin Assay - Final Complete


 


 8/8/19 23:00


Urine,Clean Catch Urine Culture - Final


NO GROWTH AFTER 48 HOURS Complete


 


 7/23/19 14:50


Rectum VRE Culture - Final


NO VANCOMYCIN RESISTANT ENTEROCOCCUS ... Complete








Current Medications








 Medications


  (Trade)  Dose


 Ordered  Sig/Lul


 Route


 PRN Reason  Start Time


 Stop Time Status Last Admin


Dose Admin


 


 Amlodipine


 Besylate


  (Norvasc)  10 mg  DAILY


 ORAL


   7/27/19 09:00


 8/26/19 08:59  8/18/19 08:15


 


 


 Aspirin


  (Ecotrin)  81 mg  DAILY


 ORAL


   7/27/19 09:00


 8/23/19 08:59  8/18/19 08:14


 


 


 Cholestyramine


 Resin


  (Questran)  4 gm  TIDPRN  PRN


 ORAL


 Diarrhea  8/15/19 12:15


 9/14/19 12:14  8/17/19 08:44


 


 


 Escitalopram


 Oxalate


  (Lexapro)  10 mg  DAILY


 ORAL


   8/2/19 09:00


 9/1/19 08:59  8/18/19 08:14


 


 


 Finasteride


  (Proscar)  5 mg  DAILY


 ORAL


   8/12/19 09:00


 9/11/19 08:59  8/18/19 08:14


 


 


 Folic Acid


  (Folate)  1 mg  DAILY


 ORAL


   7/27/19 09:00


 8/23/19 08:59  8/18/19 08:14


 


 


 Magnesium Oxide


  (Mag-Ox 400mg)  400 mg  THREE TIMES A  DAY


 ORAL


   7/30/19 13:00


 8/29/19 12:59  8/18/19 08:14


 


 


 Multivitamins


  (Multivitamins)  1 tab  DAILY


 ORAL


   7/27/19 09:00


 8/23/19 08:59  8/18/19 08:15


 


 


 Risperidone


  (RisperDAL)  2 mg  BEDTIME


 ORAL


   8/9/19 21:00


 9/2/19 20:59  8/17/19 20:07


 


 


 Sodium Chloride  1,000 ml @ 


 50 mls/hr  Q20H


 IV


   8/16/19 16:09


 9/15/19 16:08  8/17/19 13:27


 


 


 Tamsulosin HCl


  (Flomax)  0.4 mg  BID


 ORAL


   7/27/19 09:00


 8/22/19 19:00  8/18/19 08:14


 


 


 Thiamine HCl


  (Vitamin B1)  100 mg  DAILY


 ORAL


   8/2/19 09:00


 9/1/19 08:59  8/18/19 08:14


 


 


 Vancomycin HCl


  (Firvanq)  250 mg  FOUR TIMES A  DAY


 ORAL


   8/15/19 13:00


 8/22/19 12:59  8/18/19 08:14


 








Height (Feet):  5


Height (Inches):  10.00


Weight (Pounds):  184


Objective


 exam stable





last renal u/s negative for hydro











Israel Pink MD Aug 18, 2019 09:56

## 2019-08-19 VITALS — DIASTOLIC BLOOD PRESSURE: 80 MMHG | SYSTOLIC BLOOD PRESSURE: 136 MMHG

## 2019-08-19 VITALS — SYSTOLIC BLOOD PRESSURE: 125 MMHG | DIASTOLIC BLOOD PRESSURE: 60 MMHG

## 2019-08-19 VITALS — DIASTOLIC BLOOD PRESSURE: 99 MMHG | SYSTOLIC BLOOD PRESSURE: 120 MMHG

## 2019-08-19 VITALS — DIASTOLIC BLOOD PRESSURE: 68 MMHG | SYSTOLIC BLOOD PRESSURE: 121 MMHG

## 2019-08-19 VITALS — DIASTOLIC BLOOD PRESSURE: 71 MMHG | SYSTOLIC BLOOD PRESSURE: 132 MMHG

## 2019-08-19 RX ADMIN — VANCOMYCIN HYDROCHLORIDE SCH MG: 500 INJECTION, POWDER, LYOPHILIZED, FOR SOLUTION INTRAVENOUS at 21:52

## 2019-08-19 RX ADMIN — MAGNESIUM OXIDE TAB 400 MG (241.3 MG ELEMENTAL MG) SCH MG: 400 (241.3 MG) TAB at 09:17

## 2019-08-19 RX ADMIN — VANCOMYCIN HYDROCHLORIDE SCH MG: 500 INJECTION, POWDER, LYOPHILIZED, FOR SOLUTION INTRAVENOUS at 14:09

## 2019-08-19 RX ADMIN — VANCOMYCIN HYDROCHLORIDE SCH MG: 500 INJECTION, POWDER, LYOPHILIZED, FOR SOLUTION INTRAVENOUS at 18:20

## 2019-08-19 RX ADMIN — MAGNESIUM OXIDE TAB 400 MG (241.3 MG ELEMENTAL MG) SCH MG: 400 (241.3 MG) TAB at 14:09

## 2019-08-19 RX ADMIN — MAGNESIUM OXIDE TAB 400 MG (241.3 MG ELEMENTAL MG) SCH MG: 400 (241.3 MG) TAB at 18:20

## 2019-08-19 RX ADMIN — CHOLESTYRAMINE PRN GM: 4 POWDER, FOR SUSPENSION ORAL at 10:27

## 2019-08-19 RX ADMIN — Medication SCH MG: at 09:17

## 2019-08-19 RX ADMIN — ASPIRIN SCH MG: 81 TABLET, DELAYED RELEASE ORAL at 09:17

## 2019-08-19 RX ADMIN — TAMSULOSIN HYDROCHLORIDE SCH MG: 0.4 CAPSULE ORAL at 09:17

## 2019-08-19 RX ADMIN — TAMSULOSIN HYDROCHLORIDE SCH MG: 0.4 CAPSULE ORAL at 18:20

## 2019-08-19 RX ADMIN — VANCOMYCIN HYDROCHLORIDE SCH MG: 500 INJECTION, POWDER, LYOPHILIZED, FOR SOLUTION INTRAVENOUS at 09:22

## 2019-08-19 NOTE — UROLOGY PROGRESS NOTE
Assessment/Plan


Status:  stable, progressing


Assessment/Plan:


1. Staghorn renal calculus.


2. Bladder calculus.


3. Mild hydronephrosis, likely chronic.


4. Chronic kidney disease.


5. Acute kidney injury.


6. Hematuria.


7. Pyuria, poss UTI.


8. Urinary retention.


9. Neurogenic bladder.


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





monitor clinically


pierre out and voiding


monitor renal fxn, labile but improved


s/p IV abx course, currently on PO Vanco


cont flomax BID


proscar added


pt with very significant and complex stone burden


will likely need multiple complicated urologic procedures with close follow up


best course of action would be higher lever of care at tertiary care facility





Subjective


Allergies:  


Coded Allergies:  


     No Known Allergies (Unverified , 7/23/19)


Subjective


all noted, voiding, looks comfortable





Objective





Last 24 Hour Vital Signs








  Date Time  Temp Pulse Resp B/P (MAP) Pulse Ox O2 Delivery O2 Flow Rate FiO2


 


8/19/19 04:13 97.6 80 18 136/80 (98) 92   


 


8/18/19 21:00      Room Air  


 


8/18/19 19:44 98.4 82 18 155/78 (103) 95   


 


8/18/19 16:00 98.2 89 18 142/90 (107) 98   


 


8/18/19 12:00 98.4 91 18 115/93 (100) 99   


 


8/18/19 09:00      Room Air  


 


8/18/19 08:15  83  119/78    


 


8/18/19 08:00 98.3 83 20 119/78 (92) 94   

















Intake and Output  


 


 8/18/19 8/19/19





 19:00 07:00


 


Intake Total 770 ml 840 ml


 


Balance 770 ml 840 ml


 


  


 


Intake Oral 720 ml 340 ml


 


IV Total 50 ml 500 ml


 


# Voids 6 3


 


# Bowel Movements 3 1











Microbiology








 Date/Time


Source Procedure


Growth Status


 


 


 7/23/19 14:50


Nasal Nares MRSA Culture - Final


Staphylococcus Aureus - Mrsa Complete


 


 7/25/19 04:45


Stool Clostridium difficile Toxin Assay - Final Complete


 


 8/8/19 23:00


Urine,Clean Catch Urine Culture - Final


NO GROWTH AFTER 48 HOURS Complete


 


 7/23/19 14:50


Rectum VRE Culture - Final


NO VANCOMYCIN RESISTANT ENTEROCOCCUS ... Complete








Current Medications








 Medications


  (Trade)  Dose


 Ordered  Sig/Lul


 Route


 PRN Reason  Start Time


 Stop Time Status Last Admin


Dose Admin


 


 Amlodipine


 Besylate


  (Norvasc)  10 mg  DAILY


 ORAL


   7/27/19 09:00


 8/26/19 08:59  8/18/19 08:15


 


 


 Aspirin


  (Ecotrin)  81 mg  DAILY


 ORAL


   7/27/19 09:00


 8/23/19 08:59  8/18/19 08:14


 


 


 Cholestyramine


 Resin


  (Questran)  4 gm  TIDPRN  PRN


 ORAL


 Diarrhea  8/15/19 12:15


 9/14/19 12:14  8/18/19 11:13


 


 


 Escitalopram


 Oxalate


  (Lexapro)  10 mg  DAILY


 ORAL


   8/2/19 09:00


 9/1/19 08:59  8/18/19 08:14


 


 


 Finasteride


  (Proscar)  5 mg  DAILY


 ORAL


   8/12/19 09:00


 9/11/19 08:59  8/18/19 08:14


 


 


 Folic Acid


  (Folate)  1 mg  DAILY


 ORAL


   7/27/19 09:00


 8/23/19 08:59  8/18/19 08:14


 


 


 Lorazepam


  (Ativan)  2 mg  Q6H  PRN


 ORAL


 anxiety, agitation  8/18/19 13:45


 8/25/19 13:44   


 


 


 Magnesium Oxide


  (Mag-Ox 400mg)  400 mg  THREE TIMES A  DAY


 ORAL


   7/30/19 13:00


 8/29/19 12:59  8/18/19 17:09


 


 


 Multivitamins


  (Multivitamins)  1 tab  DAILY


 ORAL


   7/27/19 09:00


 8/23/19 08:59  8/18/19 08:15


 


 


 Risperidone


  (RisperDAL)  2 mg  BEDTIME


 ORAL


   8/9/19 21:00


 9/2/19 20:59  8/18/19 20:20


 


 


 Sodium Chloride  1,000 ml @ 


 50 mls/hr  Q20H


 IV


   8/16/19 16:09


 9/15/19 16:08  8/17/19 13:27


 


 


 Tamsulosin HCl


  (Flomax)  0.4 mg  BID


 ORAL


   7/27/19 09:00


 8/22/19 19:00  8/18/19 17:09


 


 


 Thiamine HCl


  (Vitamin B1)  100 mg  DAILY


 ORAL


   8/2/19 09:00


 9/1/19 08:59  8/18/19 08:14


 


 


 Vancomycin HCl


  (Firvanq)  250 mg  FOUR TIMES A  DAY


 ORAL


   8/15/19 13:00


 8/22/19 12:59  8/18/19 20:21


 








Height (Feet):  5


Height (Inches):  10.00


Weight (Pounds):  184


Objective


 exam stable





last renal u/s negative for hydro











Israel Pink MD Aug 19, 2019 07:56

## 2019-08-20 VITALS — SYSTOLIC BLOOD PRESSURE: 117 MMHG | DIASTOLIC BLOOD PRESSURE: 76 MMHG

## 2019-08-20 VITALS — DIASTOLIC BLOOD PRESSURE: 66 MMHG | SYSTOLIC BLOOD PRESSURE: 116 MMHG

## 2019-08-20 VITALS — DIASTOLIC BLOOD PRESSURE: 64 MMHG | SYSTOLIC BLOOD PRESSURE: 113 MMHG

## 2019-08-20 VITALS — DIASTOLIC BLOOD PRESSURE: 76 MMHG | SYSTOLIC BLOOD PRESSURE: 156 MMHG

## 2019-08-20 VITALS — SYSTOLIC BLOOD PRESSURE: 128 MMHG | DIASTOLIC BLOOD PRESSURE: 72 MMHG

## 2019-08-20 VITALS — SYSTOLIC BLOOD PRESSURE: 136 MMHG | DIASTOLIC BLOOD PRESSURE: 79 MMHG

## 2019-08-20 RX ADMIN — MAGNESIUM OXIDE TAB 400 MG (241.3 MG ELEMENTAL MG) SCH MG: 400 (241.3 MG) TAB at 17:50

## 2019-08-20 RX ADMIN — CHOLESTYRAMINE PRN GM: 4 POWDER, FOR SUSPENSION ORAL at 16:16

## 2019-08-20 RX ADMIN — Medication SCH MG: at 08:37

## 2019-08-20 RX ADMIN — TAMSULOSIN HYDROCHLORIDE SCH MG: 0.4 CAPSULE ORAL at 17:50

## 2019-08-20 RX ADMIN — MAGNESIUM OXIDE TAB 400 MG (241.3 MG ELEMENTAL MG) SCH MG: 400 (241.3 MG) TAB at 08:37

## 2019-08-20 RX ADMIN — VANCOMYCIN HYDROCHLORIDE SCH MG: 500 INJECTION, POWDER, LYOPHILIZED, FOR SOLUTION INTRAVENOUS at 21:15

## 2019-08-20 RX ADMIN — VANCOMYCIN HYDROCHLORIDE SCH MG: 500 INJECTION, POWDER, LYOPHILIZED, FOR SOLUTION INTRAVENOUS at 08:37

## 2019-08-20 RX ADMIN — VANCOMYCIN HYDROCHLORIDE SCH MG: 500 INJECTION, POWDER, LYOPHILIZED, FOR SOLUTION INTRAVENOUS at 18:00

## 2019-08-20 RX ADMIN — ASPIRIN SCH MG: 81 TABLET, DELAYED RELEASE ORAL at 08:37

## 2019-08-20 RX ADMIN — MAGNESIUM OXIDE TAB 400 MG (241.3 MG ELEMENTAL MG) SCH MG: 400 (241.3 MG) TAB at 12:25

## 2019-08-20 RX ADMIN — VANCOMYCIN HYDROCHLORIDE SCH MG: 500 INJECTION, POWDER, LYOPHILIZED, FOR SOLUTION INTRAVENOUS at 12:26

## 2019-08-20 RX ADMIN — TAMSULOSIN HYDROCHLORIDE SCH MG: 0.4 CAPSULE ORAL at 08:37

## 2019-08-20 NOTE — UROLOGY PROGRESS NOTE
Assessment/Plan


Status:  stable, progressing


Assessment/Plan:


1. Staghorn renal calculus.


2. Bladder calculus.


3. Mild hydronephrosis, likely chronic.


4. Chronic kidney disease.


5. Acute kidney injury.


6. Hematuria.


7. Pyuria, poss UTI.


8. Urinary retention.


9. Neurogenic bladder.


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





monitor clinically


pierre out and voiding


monitor renal fxn, labile but improved


s/p IV abx course, currently on PO Vanco


cont flomax BID


proscar added


pt with very significant and complex stone burden


will likely need multiple complicated urologic procedures with close follow up


best course of action would be higher lever of care at tertiary care facility





Subjective


Allergies:  


Coded Allergies:  


     No Known Allergies (Unverified , 7/23/19)


Subjective


all noted, voiding, looks comfortable





Objective





Last 24 Hour Vital Signs








  Date Time  Temp Pulse Resp B/P (MAP) Pulse Ox O2 Delivery O2 Flow Rate FiO2


 


8/20/19 04:00 97.6 84 18 113/64 (80) 96   


 


8/20/19 00:00 97.8 69 18 136/79 (98) 98   


 


8/19/19 21:00      Room Air  


 


8/19/19 20:00 98.7 72 18 125/60 (81) 95   


 


8/19/19 16:00 98.4 84 18 132/71 (91) 98   


 


8/19/19 12:00 99.1 87 18 120/99 (106) 96   


 


8/19/19 09:17  67  121/68    


 


8/19/19 09:00      Room Air  


 


8/19/19 08:00 97.3 67 18 121/68 (85) 98   

















Intake and Output  


 


 8/19/19 8/20/19





 19:00 07:00


 


Intake Total 770 ml 450 ml


 


Balance 770 ml 450 ml


 


  


 


Intake Oral 720 ml 


 


IV Total 50 ml 450 ml


 


# Voids 3 3


 


# Bowel Movements 3 2











Microbiology








 Date/Time


Source Procedure


Growth Status


 


 


 7/23/19 14:50


Nasal Nares MRSA Culture - Final


Staphylococcus Aureus - Mrsa Complete


 


 7/25/19 04:45


Stool Clostridium difficile Toxin Assay - Final Complete


 


 8/8/19 23:00


Urine,Clean Catch Urine Culture - Final


NO GROWTH AFTER 48 HOURS Complete


 


 7/23/19 14:50


Rectum VRE Culture - Final


NO VANCOMYCIN RESISTANT ENTEROCOCCUS ... Complete








Current Medications








 Medications


  (Trade)  Dose


 Ordered  Sig/Lul


 Route


 PRN Reason  Start Time


 Stop Time Status Last Admin


Dose Admin


 


 Amlodipine


 Besylate


  (Norvasc)  10 mg  DAILY


 ORAL


   7/27/19 09:00


 8/26/19 08:59  8/19/19 09:17


 


 


 Aspirin


  (Ecotrin)  81 mg  DAILY


 ORAL


   7/27/19 09:00


 8/23/19 08:59  8/19/19 09:17


 


 


 Cholestyramine


 Resin


  (Questran)  4 gm  TIDPRN  PRN


 ORAL


 Diarrhea  8/15/19 12:15


 9/14/19 12:14  8/19/19 10:27


 


 


 Escitalopram


 Oxalate


  (Lexapro)  10 mg  DAILY


 ORAL


   8/2/19 09:00


 9/1/19 08:59  8/19/19 09:17


 


 


 Finasteride


  (Proscar)  5 mg  DAILY


 ORAL


   8/12/19 09:00


 9/11/19 08:59  8/19/19 09:17


 


 


 Folic Acid


  (Folate)  1 mg  DAILY


 ORAL


   7/27/19 09:00


 8/23/19 08:59  8/19/19 09:17


 


 


 Lorazepam


  (Ativan)  2 mg  Q6H  PRN


 ORAL


 anxiety, agitation  8/18/19 13:45


 8/25/19 13:44   


 


 


 Magnesium Oxide


  (Mag-Ox 400mg)  400 mg  THREE TIMES A  DAY


 ORAL


   7/30/19 13:00


 8/29/19 12:59  8/19/19 18:20


 


 


 Multivitamins


  (Multivitamins)  1 tab  DAILY


 ORAL


   7/27/19 09:00


 8/23/19 08:59  8/19/19 09:17


 


 


 Risperidone


  (RisperDAL)  2 mg  BEDTIME


 ORAL


   8/9/19 21:00


 9/2/19 20:59  8/19/19 21:51


 


 


 Sodium Chloride  1,000 ml @ 


 50 mls/hr  Q20H


 IV


   8/16/19 16:09


 9/15/19 16:08  8/19/19 21:51


 


 


 Tamsulosin HCl


  (Flomax)  0.4 mg  BID


 ORAL


   7/27/19 09:00


 8/22/19 19:00  8/19/19 18:20


 


 


 Thiamine HCl


  (Vitamin B1)  100 mg  DAILY


 ORAL


   8/2/19 09:00


 9/1/19 08:59  8/19/19 09:17


 


 


 Vancomycin HCl


  (Firvanq)  250 mg  FOUR TIMES A  DAY


 ORAL


   8/15/19 13:00


 8/22/19 12:59  8/19/19 21:52


 








Height (Feet):  5


Height (Inches):  10.00


Weight (Pounds):  184


Objective


 exam stable





last renal u/s negative for hydro











Israel Pink MD Aug 20, 2019 07:54

## 2019-08-21 VITALS — SYSTOLIC BLOOD PRESSURE: 130 MMHG | DIASTOLIC BLOOD PRESSURE: 76 MMHG

## 2019-08-21 VITALS — DIASTOLIC BLOOD PRESSURE: 79 MMHG | SYSTOLIC BLOOD PRESSURE: 149 MMHG

## 2019-08-21 VITALS — DIASTOLIC BLOOD PRESSURE: 79 MMHG | SYSTOLIC BLOOD PRESSURE: 125 MMHG

## 2019-08-21 VITALS — SYSTOLIC BLOOD PRESSURE: 147 MMHG | DIASTOLIC BLOOD PRESSURE: 75 MMHG

## 2019-08-21 VITALS — DIASTOLIC BLOOD PRESSURE: 82 MMHG | SYSTOLIC BLOOD PRESSURE: 139 MMHG

## 2019-08-21 VITALS — SYSTOLIC BLOOD PRESSURE: 130 MMHG | DIASTOLIC BLOOD PRESSURE: 73 MMHG

## 2019-08-21 RX ADMIN — CHOLESTYRAMINE PRN GM: 4 POWDER, FOR SUSPENSION ORAL at 14:15

## 2019-08-21 RX ADMIN — VANCOMYCIN HYDROCHLORIDE SCH MG: 500 INJECTION, POWDER, LYOPHILIZED, FOR SOLUTION INTRAVENOUS at 20:02

## 2019-08-21 RX ADMIN — TAMSULOSIN HYDROCHLORIDE SCH MG: 0.4 CAPSULE ORAL at 17:17

## 2019-08-21 RX ADMIN — Medication SCH MG: at 08:59

## 2019-08-21 RX ADMIN — TAMSULOSIN HYDROCHLORIDE SCH MG: 0.4 CAPSULE ORAL at 08:59

## 2019-08-21 RX ADMIN — VANCOMYCIN HYDROCHLORIDE SCH MG: 500 INJECTION, POWDER, LYOPHILIZED, FOR SOLUTION INTRAVENOUS at 09:00

## 2019-08-21 RX ADMIN — VANCOMYCIN HYDROCHLORIDE SCH MG: 500 INJECTION, POWDER, LYOPHILIZED, FOR SOLUTION INTRAVENOUS at 14:15

## 2019-08-21 RX ADMIN — MAGNESIUM OXIDE TAB 400 MG (241.3 MG ELEMENTAL MG) SCH MG: 400 (241.3 MG) TAB at 14:15

## 2019-08-21 RX ADMIN — VANCOMYCIN HYDROCHLORIDE SCH MG: 500 INJECTION, POWDER, LYOPHILIZED, FOR SOLUTION INTRAVENOUS at 17:17

## 2019-08-21 RX ADMIN — MAGNESIUM OXIDE TAB 400 MG (241.3 MG ELEMENTAL MG) SCH MG: 400 (241.3 MG) TAB at 17:17

## 2019-08-21 RX ADMIN — ASPIRIN SCH MG: 81 TABLET, DELAYED RELEASE ORAL at 08:59

## 2019-08-21 RX ADMIN — MAGNESIUM OXIDE TAB 400 MG (241.3 MG ELEMENTAL MG) SCH MG: 400 (241.3 MG) TAB at 08:59

## 2019-08-21 NOTE — UROLOGY PROGRESS NOTE
Assessment/Plan


Status:  stable, progressing


Assessment/Plan:


1. Staghorn renal calculus.


2. Bladder calculus.


3. Mild hydronephrosis, likely chronic.


4. Chronic kidney disease.


5. Acute kidney injury.


6. Hematuria.


7. Pyuria, poss UTI.


8. Urinary retention.


9. Neurogenic bladder.


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





monitor clinically


pierre out and voiding


monitor renal fxn, labile but improved


s/p IV abx course, currently on PO Vanco


cont flomax BID


proscar added


pt with very significant and complex stone burden


will likely need multiple complicated urologic procedures with close follow up


best course of action would be higher lever of care at tertiary care facility





Subjective


Allergies:  


Coded Allergies:  


     No Known Allergies (Unverified , 7/23/19)


Subjective


all noted, voiding, looks comfortable





Objective





Last 24 Hour Vital Signs








  Date Time  Temp Pulse Resp B/P (MAP) Pulse Ox O2 Delivery O2 Flow Rate FiO2


 


8/21/19 04:00 98.6 68 18 147/75 (99) 98   


 


8/21/19 00:00 98.4 71 19 149/79 (102) 97   


 


8/20/19 21:00      Room Air  


 


8/20/19 20:00 98.6 66 17 156/76 (102) 98   


 


8/20/19 16:00 99.5 68 20 128/72 (90) 96   


 


8/20/19 12:00 98.7 80 22 117/76 (90) 96   


 


8/20/19 09:00      Room Air  


 


8/20/19 08:37  83  116/66    


 


8/20/19 08:00 97.4 83 23 116/66 (83) 96   

















Intake and Output  


 


 8/20/19 8/21/19





 18:59 06:59


 


Intake Total  480 ml


 


Balance  480 ml


 


  


 


Intake Oral  480 ml


 


# Voids 2 3


 


# Bowel Movements  2











Microbiology








 Date/Time


Source Procedure


Growth Status


 


 


 7/23/19 14:50


Nasal Nares MRSA Culture - Final


Staphylococcus Aureus - Mrsa Complete


 


 7/25/19 04:45


Stool Clostridium difficile Toxin Assay - Final Complete


 


 8/8/19 23:00


Urine,Clean Catch Urine Culture - Final


NO GROWTH AFTER 48 HOURS Complete


 


 7/23/19 14:50


Rectum VRE Culture - Final


NO VANCOMYCIN RESISTANT ENTEROCOCCUS ... Complete








Current Medications








 Medications


  (Trade)  Dose


 Ordered  Sig/Lul


 Route


 PRN Reason  Start Time


 Stop Time Status Last Admin


Dose Admin


 


 Amlodipine


 Besylate


  (Norvasc)  10 mg  DAILY


 ORAL


   7/27/19 09:00


 8/26/19 08:59  8/20/19 08:37


 


 


 Aspirin


  (Ecotrin)  81 mg  DAILY


 ORAL


   7/27/19 09:00


 8/23/19 08:59  8/20/19 08:37


 


 


 Cholestyramine


 Resin


  (Questran)  4 gm  TIDPRN  PRN


 ORAL


 Diarrhea  8/15/19 12:15


 9/14/19 12:14  8/20/19 16:16


 


 


 Escitalopram


 Oxalate


  (Lexapro)  10 mg  DAILY


 ORAL


   8/2/19 09:00


 9/1/19 08:59  8/20/19 08:37


 


 


 Finasteride


  (Proscar)  5 mg  DAILY


 ORAL


   8/12/19 09:00


 9/11/19 08:59  8/20/19 08:37


 


 


 Folic Acid


  (Folate)  1 mg  DAILY


 ORAL


   7/27/19 09:00


 8/23/19 08:59  8/20/19 08:37


 


 


 Lorazepam


  (Ativan)  2 mg  Q6H  PRN


 ORAL


 anxiety, agitation  8/18/19 13:45


 8/25/19 13:44   


 


 


 Magnesium Oxide


  (Mag-Ox 400mg)  400 mg  THREE TIMES A  DAY


 ORAL


   7/30/19 13:00


 8/29/19 12:59  8/20/19 17:50


 


 


 Multivitamins


  (Multivitamins)  1 tab  DAILY


 ORAL


   7/27/19 09:00


 8/23/19 08:59  8/20/19 08:37


 


 


 Risperidone


  (RisperDAL)  2 mg  BEDTIME


 ORAL


   8/9/19 21:00


 9/2/19 20:59  8/20/19 21:15


 


 


 Sodium Chloride  1,000 ml @ 


 50 mls/hr  Q20H


 IV


   8/16/19 16:09


 9/15/19 16:08  8/20/19 21:14


 


 


 Tamsulosin HCl


  (Flomax)  0.4 mg  BID


 ORAL


   7/27/19 09:00


 8/22/19 19:00  8/20/19 17:50


 


 


 Thiamine HCl


  (Vitamin B1)  100 mg  DAILY


 ORAL


   8/2/19 09:00


 9/1/19 08:59  8/20/19 08:37


 


 


 Vancomycin HCl


  (Firvanq)  250 mg  FOUR TIMES A  DAY


 ORAL


   8/15/19 13:00


 8/22/19 12:59  8/20/19 21:15


 








Height (Feet):  5


Height (Inches):  10.00


Weight (Pounds):  183


Objective


 exam stable





last renal u/s negative for hydro











Israel Pink MD Aug 21, 2019 07:51

## 2019-08-22 VITALS — SYSTOLIC BLOOD PRESSURE: 139 MMHG | DIASTOLIC BLOOD PRESSURE: 83 MMHG

## 2019-08-22 VITALS — SYSTOLIC BLOOD PRESSURE: 153 MMHG | DIASTOLIC BLOOD PRESSURE: 79 MMHG

## 2019-08-22 VITALS — SYSTOLIC BLOOD PRESSURE: 132 MMHG | DIASTOLIC BLOOD PRESSURE: 79 MMHG

## 2019-08-22 VITALS — SYSTOLIC BLOOD PRESSURE: 150 MMHG | DIASTOLIC BLOOD PRESSURE: 83 MMHG

## 2019-08-22 RX ADMIN — VANCOMYCIN HYDROCHLORIDE SCH MG: 500 INJECTION, POWDER, LYOPHILIZED, FOR SOLUTION INTRAVENOUS at 20:47

## 2019-08-22 RX ADMIN — VANCOMYCIN HYDROCHLORIDE SCH MG: 500 INJECTION, POWDER, LYOPHILIZED, FOR SOLUTION INTRAVENOUS at 17:45

## 2019-08-22 RX ADMIN — VANCOMYCIN HYDROCHLORIDE SCH MG: 500 INJECTION, POWDER, LYOPHILIZED, FOR SOLUTION INTRAVENOUS at 14:22

## 2019-08-22 RX ADMIN — MAGNESIUM OXIDE TAB 400 MG (241.3 MG ELEMENTAL MG) SCH MG: 400 (241.3 MG) TAB at 17:45

## 2019-08-22 RX ADMIN — ASPIRIN SCH MG: 81 TABLET, DELAYED RELEASE ORAL at 11:03

## 2019-08-22 RX ADMIN — MAGNESIUM OXIDE TAB 400 MG (241.3 MG ELEMENTAL MG) SCH MG: 400 (241.3 MG) TAB at 11:03

## 2019-08-22 RX ADMIN — Medication SCH MG: at 11:03

## 2019-08-22 RX ADMIN — TAMSULOSIN HYDROCHLORIDE SCH MG: 0.4 CAPSULE ORAL at 11:03

## 2019-08-22 RX ADMIN — TAMSULOSIN HYDROCHLORIDE SCH MG: 0.4 CAPSULE ORAL at 17:45

## 2019-08-22 RX ADMIN — MAGNESIUM OXIDE TAB 400 MG (241.3 MG ELEMENTAL MG) SCH MG: 400 (241.3 MG) TAB at 14:22

## 2019-08-22 RX ADMIN — VANCOMYCIN HYDROCHLORIDE SCH MG: 500 INJECTION, POWDER, LYOPHILIZED, FOR SOLUTION INTRAVENOUS at 11:05

## 2019-08-22 NOTE — UROLOGY PROGRESS NOTE
Assessment/Plan


Status:  stable, progressing


Assessment/Plan:


1. Staghorn renal calculus.


2. Bladder calculus.


3. Mild hydronephrosis, likely chronic.


4. Chronic kidney disease.


5. Acute kidney injury.


6. Hematuria.


7. Pyuria, poss UTI.


8. Urinary retention.


9. Neurogenic bladder.


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





monitor clinically


pierre out and voiding


monitor renal fxn, labile but improved


s/p IV abx course, currently on PO Vanco


cont flomax BID


proscar added


pt with very significant and complex stone burden


will likely need multiple complicated urologic procedures with close follow up


best course of action would be higher lever of care at tertiary care facility





Subjective


Allergies:  


Coded Allergies:  


     No Known Allergies (Unverified , 7/23/19)


Subjective


all noted, voiding, looks comfortable





Objective





Last 24 Hour Vital Signs








  Date Time  Temp Pulse Resp B/P (MAP) Pulse Ox O2 Delivery O2 Flow Rate FiO2


 


8/22/19 04:00 97.6 71 20 150/83 (105) 96   


 


8/22/19 00:00 98.0 69 18 139/83 (101) 95   


 


8/21/19 21:00      Room Air  


 


8/21/19 20:00 97.1 83 20 130/73 (92) 97   


 


8/21/19 16:00 98.6 72 19 130/76 (94) 97   


 


8/21/19 12:00 98.0 68 18 125/79 (94) 97   


 


8/21/19 09:00      Room Air  


 


8/21/19 08:59  79  139/82    

















Intake and Output  


 


 8/21/19 8/22/19





 18:59 06:59


 


Intake Total 850 ml 


 


Balance 850 ml 


 


  


 


Other 850 ml 


 


# Voids  3


 


# Bowel Movements  3











Microbiology








 Date/Time


Source Procedure


Growth Status


 


 


 7/23/19 14:50


Nasal Nares MRSA Culture - Final


Staphylococcus Aureus - Mrsa Complete


 


 7/25/19 04:45


Stool Clostridium difficile Toxin Assay - Final Complete


 


 8/8/19 23:00


Urine,Clean Catch Urine Culture - Final


NO GROWTH AFTER 48 HOURS Complete


 


 7/23/19 14:50


Rectum VRE Culture - Final


NO VANCOMYCIN RESISTANT ENTEROCOCCUS ... Complete








Current Medications








 Medications


  (Trade)  Dose


 Ordered  Sig/Lul


 Route


 PRN Reason  Start Time


 Stop Time Status Last Admin


Dose Admin


 


 Amlodipine


 Besylate


  (Norvasc)  10 mg  DAILY


 ORAL


   7/27/19 09:00


 8/26/19 08:59  8/21/19 08:59


 


 


 Aspirin


  (Ecotrin)  81 mg  DAILY


 ORAL


   7/27/19 09:00


 8/23/19 08:59  8/21/19 08:59


 


 


 Cholestyramine


 Resin


  (Questran)  4 gm  TIDPRN  PRN


 ORAL


 Diarrhea  8/15/19 12:15


 9/14/19 12:14  8/21/19 14:15


 


 


 Escitalopram


 Oxalate


  (Lexapro)  10 mg  DAILY


 ORAL


   8/2/19 09:00


 9/1/19 08:59  8/21/19 08:59


 


 


 Finasteride


  (Proscar)  5 mg  DAILY


 ORAL


   8/12/19 09:00


 9/11/19 08:59  8/21/19 08:59


 


 


 Folic Acid


  (Folate)  1 mg  DAILY


 ORAL


   7/27/19 09:00


 8/23/19 08:59  8/21/19 08:59


 


 


 Lorazepam


  (Ativan)  2 mg  Q6H  PRN


 ORAL


 anxiety, agitation  8/18/19 13:45


 8/25/19 13:44   


 


 


 Magnesium Oxide


  (Mag-Ox 400mg)  400 mg  THREE TIMES A  DAY


 ORAL


   7/30/19 13:00


 8/29/19 12:59  8/21/19 17:17


 


 


 Multivitamins


  (Multivitamins)  1 tab  DAILY


 ORAL


   7/27/19 09:00


 8/23/19 08:59  8/21/19 09:00


 


 


 Risperidone


  (RisperDAL)  2 mg  BEDTIME


 ORAL


   8/9/19 21:00


 9/2/19 20:59  8/21/19 20:02


 


 


 Tamsulosin HCl


  (Flomax)  0.4 mg  BID


 ORAL


   7/27/19 09:00


 8/22/19 19:00  8/21/19 17:17


 


 


 Thiamine HCl


  (Vitamin B1)  100 mg  DAILY


 ORAL


   8/2/19 09:00


 9/1/19 08:59  8/21/19 08:59


 


 


 Vancomycin HCl


  (Firvanq)  250 mg  FOUR TIMES A  DAY


 ORAL


   8/15/19 13:00


 8/22/19 12:59  8/21/19 20:02


 








Height (Feet):  5


Height (Inches):  10.00


Weight (Pounds):  183


Objective


 exam stable





last renal u/s negative for Israel Juarez MD Aug 22, 2019 08:49

## 2019-08-23 VITALS — DIASTOLIC BLOOD PRESSURE: 79 MMHG | SYSTOLIC BLOOD PRESSURE: 133 MMHG

## 2019-08-23 VITALS — SYSTOLIC BLOOD PRESSURE: 134 MMHG | DIASTOLIC BLOOD PRESSURE: 74 MMHG

## 2019-08-23 VITALS — SYSTOLIC BLOOD PRESSURE: 118 MMHG | DIASTOLIC BLOOD PRESSURE: 74 MMHG

## 2019-08-23 VITALS — SYSTOLIC BLOOD PRESSURE: 115 MMHG | DIASTOLIC BLOOD PRESSURE: 67 MMHG

## 2019-08-23 VITALS — SYSTOLIC BLOOD PRESSURE: 120 MMHG | DIASTOLIC BLOOD PRESSURE: 70 MMHG

## 2019-08-23 VITALS — DIASTOLIC BLOOD PRESSURE: 78 MMHG | SYSTOLIC BLOOD PRESSURE: 151 MMHG

## 2019-08-23 LAB
ADD MANUAL DIFF: NO
ALBUMIN SERPL-MCNC: 3.2 G/DL (ref 3.4–5)
ALBUMIN/GLOB SERPL: 0.7 {RATIO} (ref 1–2.7)
ALP SERPL-CCNC: 76 U/L (ref 46–116)
ALT SERPL-CCNC: 61 U/L (ref 12–78)
ANION GAP SERPL CALC-SCNC: 7 MMOL/L (ref 5–15)
AST SERPL-CCNC: 65 U/L (ref 15–37)
BASOPHILS NFR BLD AUTO: 1.9 % (ref 0–2)
BILIRUB SERPL-MCNC: 0.6 MG/DL (ref 0.2–1)
BUN SERPL-MCNC: 21 MG/DL (ref 7–18)
CALCIUM SERPL-MCNC: 9.2 MG/DL (ref 8.5–10.1)
CHLORIDE SERPL-SCNC: 106 MMOL/L (ref 98–107)
CO2 SERPL-SCNC: 25 MMOL/L (ref 21–32)
CREAT SERPL-MCNC: 1.4 MG/DL (ref 0.55–1.3)
EOSINOPHIL NFR BLD AUTO: 3.9 % (ref 0–3)
ERYTHROCYTE [DISTWIDTH] IN BLOOD BY AUTOMATED COUNT: 13.9 % (ref 11.6–14.8)
GLOBULIN SER-MCNC: 4.6 G/DL
HCT VFR BLD CALC: 40.4 % (ref 42–52)
HGB BLD-MCNC: 13.3 G/DL (ref 14.2–18)
LYMPHOCYTES NFR BLD AUTO: 22.4 % (ref 20–45)
MCV RBC AUTO: 89 FL (ref 80–99)
MONOCYTES NFR BLD AUTO: 8 % (ref 1–10)
NEUTROPHILS NFR BLD AUTO: 63.9 % (ref 45–75)
PLATELET # BLD: 211 K/UL (ref 150–450)
POTASSIUM SERPL-SCNC: 4.3 MMOL/L (ref 3.5–5.1)
RBC # BLD AUTO: 4.56 M/UL (ref 4.7–6.1)
SODIUM SERPL-SCNC: 138 MMOL/L (ref 136–145)
WBC # BLD AUTO: 8.9 K/UL (ref 4.8–10.8)

## 2019-08-23 RX ADMIN — MAGNESIUM OXIDE TAB 400 MG (241.3 MG ELEMENTAL MG) SCH MG: 400 (241.3 MG) TAB at 09:45

## 2019-08-23 RX ADMIN — VANCOMYCIN HYDROCHLORIDE SCH MG: 500 INJECTION, POWDER, LYOPHILIZED, FOR SOLUTION INTRAVENOUS at 14:01

## 2019-08-23 RX ADMIN — VANCOMYCIN HYDROCHLORIDE SCH MG: 500 INJECTION, POWDER, LYOPHILIZED, FOR SOLUTION INTRAVENOUS at 18:27

## 2019-08-23 RX ADMIN — MAGNESIUM OXIDE TAB 400 MG (241.3 MG ELEMENTAL MG) SCH MG: 400 (241.3 MG) TAB at 18:27

## 2019-08-23 RX ADMIN — Medication SCH MG: at 09:45

## 2019-08-23 RX ADMIN — VANCOMYCIN HYDROCHLORIDE SCH MG: 500 INJECTION, POWDER, LYOPHILIZED, FOR SOLUTION INTRAVENOUS at 09:45

## 2019-08-23 RX ADMIN — MAGNESIUM OXIDE TAB 400 MG (241.3 MG ELEMENTAL MG) SCH MG: 400 (241.3 MG) TAB at 14:00

## 2019-08-23 RX ADMIN — VANCOMYCIN HYDROCHLORIDE SCH MG: 500 INJECTION, POWDER, LYOPHILIZED, FOR SOLUTION INTRAVENOUS at 20:50

## 2019-08-23 NOTE — PROGRESS NOTE
DATE:  08/19/2019

INTERNAL MEDICINE PROGRESS NOTE



Late entry for 08/19/2019.



SUBJECTIVE:  I discussed with Dr. Fletcher.  The patient has no new

complaints.  Still with some episodes of diarrhea.



OBJECTIVE:

VITAL SIGNS:  Blood pressure 120/99, pulse 87, respirations 18, temperature

99.1.

LUNGS:  Clear.

CARDIAC:  Regular.

ABDOMEN:  Soft.

EXTREMITIES:  No edema.



IMPRESSION:

1. Nephrolithiasis.

2. Bladder outlet obstruction.

3. Hypertension.

4. Resolved bradycardia.

5. Recovered renal failure.

6. Staghorn renal calculus.



PLAN:

1. Awaiting transfer to a higher level of care for urologic

intervention.

2. Maintain adequate hydration.

3. Monitor volume status and cardiorenal parameters.









  ______________________________________________

  Peter Bolaños M.D.





DRJuanito CARLTON

D:  08/22/2019 23:41

T:  08/23/2019 00:03

JOB#:  [id number]

CC:

## 2019-08-23 NOTE — UROLOGY PROGRESS NOTE
Assessment/Plan


Status:  stable, progressing


Assessment/Plan:


1. Staghorn renal calculus.


2. Bladder calculus.


3. Mild hydronephrosis, likely chronic.


4. Chronic kidney disease.


5. Acute kidney injury.


6. Hematuria.


7. Pyuria, poss UTI.


8. Urinary retention.


9. Neurogenic bladder.


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





monitor clinically


iperre out and voiding


monitor renal fxn, labile but improved


s/p IV abx course, currently on PO Vanco


cont flomax BID


proscar added


pt with very significant and complex stone burden


will likely need multiple complicated urologic procedures with close follow up


best course of action would be higher lever of care at tertiary care facility





Subjective


Allergies:  


Coded Allergies:  


     No Known Allergies (Unverified , 7/23/19)


Subjective


all noted, voiding, looks comfortable





Objective





Last 24 Hour Vital Signs








  Date Time  Temp Pulse Resp B/P (MAP) Pulse Ox O2 Delivery O2 Flow Rate FiO2


 


8/23/19 04:00 98.1 62 20 118/74 (89) 95   


 


8/23/19 00:00 98.0 69 18 133/79 (97) 99   


 


8/22/19 21:00      Room Air  


 


8/22/19 20:00 97.8 75 18 153/79 (103) 99   





  75      


 


8/22/19 16:00 98.9 84 16 132/79 (96) 97   


 


8/22/19 11:02  86  112/75    


 


8/22/19 09:00      Room Air  

















Intake and Output  


 


 8/22/19 8/23/19





 19:00 07:00


 


Intake Total 900 ml 


 


Balance 900 ml 


 


  


 


Intake Oral 900 ml 


 


# Voids 3 4


 


# Bowel Movements 6 











Microbiology








 Date/Time


Source Procedure


Growth Status


 


 


 7/23/19 14:50


Nasal Nares MRSA Culture - Final


Staphylococcus Aureus - [place] Complete


 


 7/25/19 04:45


Stool Clostridium difficile Toxin Assay - Final Complete


 


 8/8/19 23:00


Urine,Clean Catch Urine Culture - Final


NO GROWTH AFTER 48 HOURS Complete


 


 7/23/19 14:50


Rectum VRE Culture - Final


NO VANCOMYCIN RESISTANT ENTEROCOCCUS ... Complete








Current Medications








 Medications


  (Trade)  Dose


 Ordered  Sig/Lul


 Route


 PRN Reason  Start Time


 Stop Time Status Last Admin


Dose Admin


 


 Amlodipine


 Besylate


  (Norvasc)  10 mg  DAILY


 ORAL


   7/27/19 09:00


 8/26/19 08:59  8/22/19 11:02


 


 


 Aspirin


  (Ecotrin)  81 mg  DAILY


 ORAL


   7/27/19 09:00


 8/23/19 08:59  8/22/19 11:03


 


 


 Cholestyramine


 Resin


  (Questran)  4 gm  TIDPRN  PRN


 ORAL


 Diarrhea  8/15/19 12:15


 9/14/19 12:14  8/21/19 14:15


 


 


 Escitalopram


 Oxalate


  (Lexapro)  10 mg  DAILY


 ORAL


   8/2/19 09:00


 9/1/19 08:59  8/22/19 10:58


 


 


 Finasteride


  (Proscar)  5 mg  DAILY


 ORAL


   8/12/19 09:00


 9/11/19 08:59  8/22/19 11:03


 


 


 Folic Acid


  (Folate)  1 mg  DAILY


 ORAL


   7/27/19 09:00


 8/23/19 08:59  8/22/19 11:03


 


 


 Lorazepam


  (Ativan)  2 mg  Q6H  PRN


 ORAL


 anxiety, agitation  8/18/19 13:45


 8/25/19 13:44   


 


 


 Magnesium Oxide


  (Mag-Ox 400mg)  400 mg  THREE TIMES A  DAY


 ORAL


   7/30/19 13:00


 8/29/19 12:59  8/22/19 17:45


 


 


 Multivitamins


  (Multivitamins)  1 tab  DAILY


 ORAL


   7/27/19 09:00


 8/23/19 08:59  8/22/19 11:03


 


 


 Risperidone


  (RisperDAL)  2 mg  BEDTIME


 ORAL


   8/9/19 21:00


 9/2/19 20:59  8/22/19 20:47


 


 


 Thiamine HCl


  (Vitamin B1)  100 mg  DAILY


 ORAL


   8/2/19 09:00


 9/1/19 08:59  8/22/19 11:03


 


 


 Vancomycin HCl


  (Firvanq)  250 mg  FOUR TIMES A  DAY


 ORAL


   8/22/19 14:00


 8/25/19 13:59  8/22/19 20:47


 








Height (Feet):  5


Height (Inches):  10.00


Weight (Pounds):  183


Objective


 exam stable





last renal u/s negative for Israel Juarez MD Aug 23, 2019 07:58

## 2019-08-23 NOTE — PROGRESS NOTE
DATE:  08/20/2019

INTERNAL MEDICINE PROGRESS NOTE



Late entry for 08/20/2019.



Status unchanged.  Discharge planning in progress.  Vitals are stable.

Intake good.  Still with a massive diarrhea requiring antidiarrheal

agents.  Orders reviewed and updated.









  ______________________________________________

  Peter Bolaños M.D.





DR:  LISANDRO

D:  08/22/2019 23:47

T:  08/22/2019 23:53

JOB#:  [id number]

CC:

## 2019-08-24 VITALS — DIASTOLIC BLOOD PRESSURE: 94 MMHG | SYSTOLIC BLOOD PRESSURE: 157 MMHG

## 2019-08-24 VITALS — DIASTOLIC BLOOD PRESSURE: 70 MMHG | SYSTOLIC BLOOD PRESSURE: 107 MMHG

## 2019-08-24 VITALS — DIASTOLIC BLOOD PRESSURE: 69 MMHG | SYSTOLIC BLOOD PRESSURE: 109 MMHG

## 2019-08-24 VITALS — SYSTOLIC BLOOD PRESSURE: 106 MMHG | DIASTOLIC BLOOD PRESSURE: 78 MMHG

## 2019-08-24 VITALS — DIASTOLIC BLOOD PRESSURE: 60 MMHG | SYSTOLIC BLOOD PRESSURE: 108 MMHG

## 2019-08-24 VITALS — DIASTOLIC BLOOD PRESSURE: 69 MMHG | SYSTOLIC BLOOD PRESSURE: 110 MMHG

## 2019-08-24 RX ADMIN — VANCOMYCIN HYDROCHLORIDE SCH MG: 500 INJECTION, POWDER, LYOPHILIZED, FOR SOLUTION INTRAVENOUS at 20:36

## 2019-08-24 RX ADMIN — MAGNESIUM OXIDE TAB 400 MG (241.3 MG ELEMENTAL MG) SCH MG: 400 (241.3 MG) TAB at 17:20

## 2019-08-24 RX ADMIN — MAGNESIUM OXIDE TAB 400 MG (241.3 MG ELEMENTAL MG) SCH MG: 400 (241.3 MG) TAB at 09:27

## 2019-08-24 RX ADMIN — VANCOMYCIN HYDROCHLORIDE SCH MG: 500 INJECTION, POWDER, LYOPHILIZED, FOR SOLUTION INTRAVENOUS at 09:28

## 2019-08-24 RX ADMIN — TAMSULOSIN HYDROCHLORIDE SCH MG: 0.4 CAPSULE ORAL at 17:20

## 2019-08-24 RX ADMIN — MAGNESIUM OXIDE TAB 400 MG (241.3 MG ELEMENTAL MG) SCH MG: 400 (241.3 MG) TAB at 13:20

## 2019-08-24 RX ADMIN — VANCOMYCIN HYDROCHLORIDE SCH MG: 500 INJECTION, POWDER, LYOPHILIZED, FOR SOLUTION INTRAVENOUS at 13:20

## 2019-08-24 RX ADMIN — Medication SCH MG: at 09:27

## 2019-08-24 RX ADMIN — VANCOMYCIN HYDROCHLORIDE SCH MG: 500 INJECTION, POWDER, LYOPHILIZED, FOR SOLUTION INTRAVENOUS at 17:20

## 2019-08-24 NOTE — UROLOGY PROGRESS NOTE
Assessment/Plan


Status:  stable, progressing


Assessment/Plan:


1. Staghorn renal calculus.


2. Bladder calculus.


3. Mild hydronephrosis, likely chronic.


4. Chronic kidney disease.


5. Acute kidney injury.


6. Hematuria.


7. Pyuria, poss UTI.


8. Urinary retention.


9. Neurogenic bladder.


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





monitor clinically


pierre out and voiding


monitor renal fxn, labile


s/p IV abx course, currently on PO Vanco


resume flomax BID


proscar added


pt with very significant and complex stone burden


will likely need multiple complicated urologic procedures with close follow up


best course of action would be higher lever of care at tertiary care facility





Subjective


Allergies:  


Coded Allergies:  


     No Known Allergies (Unverified , 7/23/19)


Subjective


all noted, voiding, looks comfortable





Objective





Last 24 Hour Vital Signs








  Date Time  Temp Pulse Resp B/P (MAP) Pulse Ox O2 Delivery O2 Flow Rate FiO2


 


8/24/19 04:00 96.9 85 19 157/94 (115) 98   


 


8/24/19 00:00 97.9 80 19 107/70 (82) 96   


 


8/23/19 21:00      Room Air  


 


8/23/19 20:00 98.0 82 19 151/78 (102) 95   


 


8/23/19 16:00 98.2 83 16 134/74 (94) 95   


 


8/23/19 12:00 98.3 76 18 120/70 (87) 100   


 


8/23/19 09:45  70  115/67    

















Intake and Output  


 


 8/23/19 8/24/19





 19:00 07:00


 


Intake Total 1600 ml 2450 ml


 


Balance 1600 ml 2450 ml


 


  


 


Intake Oral 1600 ml 1600 ml


 


Other  850 ml


 


# Voids  4


 


# Bowel Movements 3 3











Microbiology








 Date/Time


Source Procedure


Growth Status


 


 


 7/23/19 14:50


Nasal Nares MRSA Culture - Final


Staphylococcus Aureus - Mrsa Complete


 


 7/25/19 04:45


Stool Clostridium difficile Toxin Assay - Final Complete


 


 8/8/19 23:00


Urine,Clean Catch Urine Culture - Final


NO GROWTH AFTER 48 HOURS Complete


 


 7/23/19 14:50


Rectum VRE Culture - Final


NO VANCOMYCIN RESISTANT ENTEROCOCCUS ... Complete








Current Medications








 Medications


  (Trade)  Dose


 Ordered  Sig/Lul


 Route


 PRN Reason  Start Time


 Stop Time Status Last Admin


Dose Admin


 


 Amlodipine


 Besylate


  (Norvasc)  10 mg  DAILY


 ORAL


   7/27/19 09:00


 8/26/19 08:59  8/23/19 09:45


 


 


 Cholestyramine


 Resin


  (Questran)  4 gm  TIDPRN  PRN


 ORAL


 Diarrhea  8/15/19 12:15


 9/14/19 12:14  8/21/19 14:15


 


 


 Escitalopram


 Oxalate


  (Lexapro)  10 mg  DAILY


 ORAL


   8/2/19 09:00


 9/1/19 08:59  8/23/19 09:44


 


 


 Finasteride


  (Proscar)  5 mg  DAILY


 ORAL


   8/12/19 09:00


 9/11/19 08:59  8/23/19 09:44


 


 


 Lorazepam


  (Ativan)  2 mg  Q6H  PRN


 ORAL


 anxiety, agitation  8/18/19 13:45


 8/25/19 13:44   


 


 


 Magnesium Oxide


  (Mag-Ox 400mg)  400 mg  THREE TIMES A  DAY


 ORAL


   7/30/19 13:00


 8/29/19 12:59  8/23/19 18:27


 


 


 Risperidone


  (RisperDAL)  2 mg  BEDTIME


 ORAL


   8/9/19 21:00


 9/2/19 20:59  8/23/19 20:50


 


 


 Thiamine HCl


  (Vitamin B1)  100 mg  DAILY


 ORAL


   8/2/19 09:00


 9/1/19 08:59  8/23/19 09:45


 


 


 Vancomycin HCl


  (Firvanq)  250 mg  FOUR TIMES A  DAY


 ORAL


   8/22/19 14:00


 8/25/19 13:59  8/23/19 20:50


 





Laboratory Tests


8/23/19 09:35: 


White Blood Count 8.9, Red Blood Count 4.56L, Hemoglobin 13.3L, Hematocrit 40.4L

, Mean Corpuscular Volume 89, Mean Corpuscular Hemoglobin 29.2, Mean 

Corpuscular Hemoglobin Concent 32.9, Red Cell Distribution Width 13.9, Platelet 

Count 211, Mean Platelet Volume 6.0L, Neutrophils (%) (Auto) 63.9, Lymphocytes (

%) (Auto) 22.4, Monocytes (%) (Auto) 8.0, Eosinophils (%) (Auto) 3.9H, 

Basophils (%) (Auto) 1.9


8/23/19 09:40: 


Sodium Level 138, Potassium Level 4.3, Chloride Level 106, Carbon Dioxide Level 

25, Anion Gap 7, Blood Urea Nitrogen 21H, Creatinine 1.4H, Estimat Glomerular 

Filtration Rate 50.9, Glucose Level 95, Uric Acid 7.3H, Calcium Level 9.2, 

Magnesium Level 2.0, Total Bilirubin 0.6, Aspartate Amino Transf (AST/SGOT) 65H

, Alanine Aminotransferase (ALT/SGPT) 61, Alkaline Phosphatase 76, Total 

Protein 7.8, Albumin 3.2L, Globulin 4.6, Albumin/Globulin Ratio 0.7L


Height (Feet):  5


Height (Inches):  10.00


Weight (Pounds):  183


Objective


 exam stable





last renal u/s negative for hydro











Israel Pink MD Aug 24, 2019 09:10

## 2019-08-25 VITALS — SYSTOLIC BLOOD PRESSURE: 133 MMHG | DIASTOLIC BLOOD PRESSURE: 81 MMHG

## 2019-08-25 VITALS — DIASTOLIC BLOOD PRESSURE: 78 MMHG | SYSTOLIC BLOOD PRESSURE: 130 MMHG

## 2019-08-25 VITALS — SYSTOLIC BLOOD PRESSURE: 119 MMHG | DIASTOLIC BLOOD PRESSURE: 70 MMHG

## 2019-08-25 VITALS — SYSTOLIC BLOOD PRESSURE: 149 MMHG | DIASTOLIC BLOOD PRESSURE: 66 MMHG

## 2019-08-25 VITALS — SYSTOLIC BLOOD PRESSURE: 145 MMHG | DIASTOLIC BLOOD PRESSURE: 72 MMHG

## 2019-08-25 VITALS — SYSTOLIC BLOOD PRESSURE: 137 MMHG | DIASTOLIC BLOOD PRESSURE: 81 MMHG

## 2019-08-25 RX ADMIN — Medication SCH MG: at 08:43

## 2019-08-25 RX ADMIN — MAGNESIUM OXIDE TAB 400 MG (241.3 MG ELEMENTAL MG) SCH MG: 400 (241.3 MG) TAB at 12:33

## 2019-08-25 RX ADMIN — MAGNESIUM OXIDE TAB 400 MG (241.3 MG ELEMENTAL MG) SCH MG: 400 (241.3 MG) TAB at 09:00

## 2019-08-25 RX ADMIN — TAMSULOSIN HYDROCHLORIDE SCH MG: 0.4 CAPSULE ORAL at 17:22

## 2019-08-25 RX ADMIN — TAMSULOSIN HYDROCHLORIDE SCH MG: 0.4 CAPSULE ORAL at 09:00

## 2019-08-25 RX ADMIN — MAGNESIUM OXIDE TAB 400 MG (241.3 MG ELEMENTAL MG) SCH MG: 400 (241.3 MG) TAB at 17:22

## 2019-08-25 RX ADMIN — VANCOMYCIN HYDROCHLORIDE SCH MG: 500 INJECTION, POWDER, LYOPHILIZED, FOR SOLUTION INTRAVENOUS at 12:33

## 2019-08-25 NOTE — PROGRESS NOTE
DATE:  08/24/2019

INTERNAL MEDICINE PROGRESS NOTE



SUBJECTIVE:  The patient's condition unchanged.  He remains on therapy for

diarrhea due to C. diff.  Yesterday, laboratories were notable for

worsening renal function, off IV fluids.  The patient is being encouraged

to take in oral fluids.  In his way, he is still awaiting transfer to a

higher level of care for definitive urologic intervention.  Repeat

laboratory studies in one to two days to reassess renal function following

completion of oral antimicrobials for C. diff.









  ______________________________________________

  Peter Bolaños M.D.





DR:  FREDI

D:  08/25/2019 13:29

T:  08/26/2019 02:47

JOB#:  [id number]

CC:

## 2019-08-25 NOTE — UROLOGY PROGRESS NOTE
Assessment/Plan


Status:  stable, progressing


Assessment/Plan:


1. Staghorn renal calculus.


2. Bladder calculus.


3. Mild hydronephrosis, likely chronic.


4. Chronic kidney disease.


5. Acute kidney injury.


6. Hematuria.


7. Pyuria, poss UTI.


8. Urinary retention.


9. Neurogenic bladder.


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





monitor clinically


pierre out and voiding


monitor renal fxn, labile


s/p IV abx course, currently on PO Vanco


resume flomax BID


proscar added


pt with very significant and complex stone burden


will likely need multiple complicated urologic procedures with close follow up


best course of action would be higher lever of care at tertiary care facility





Subjective


Allergies:  


Coded Allergies:  


     No Known Allergies (Unverified , 7/23/19)


Subjective


all noted, voiding, looks comfortable





Objective





Last 24 Hour Vital Signs








  Date Time  Temp Pulse Resp B/P (MAP) Pulse Ox O2 Delivery O2 Flow Rate FiO2


 


8/25/19 04:00 96.9 74 19 145/72 (96) 95   


 


8/25/19 00:00 96.9 69 19 149/68 (95) 96   


 


8/24/19 21:00      Room Air  


 


8/24/19 20:00 97.9 67 19 108/60 (76) 96   


 


8/24/19 16:00 98.6 79 19 110/69 (83) 96   


 


8/24/19 12:00 98.3 88 16 109/69 (82) 97   


 


8/24/19 09:27  71  106/78    

















Intake and Output  


 


 8/24/19 8/25/19





 19:00 07:00


 


Intake Total 900 ml 2500 ml


 


Balance 900 ml 2500 ml


 


  


 


Intake Oral  1600 ml


 


Other 900 ml 900 ml


 


# Voids  4


 


# Bowel Movements  3











Microbiology








 Date/Time


Source Procedure


Growth Status


 


 


 7/23/19 14:50


Nasal Nares MRSA Culture - Final


Staphylococcus Aureus - Mrsa Complete


 


 7/25/19 04:45


Stool Clostridium difficile Toxin Assay - Final Complete


 


 8/8/19 23:00


Urine,Clean Catch Urine Culture - Final


NO GROWTH AFTER 48 HOURS Complete


 


 7/23/19 14:50


Rectum VRE Culture - Final


NO VANCOMYCIN RESISTANT ENTEROCOCCUS ... Complete








Current Medications








 Medications


  (Trade)  Dose


 Ordered  Sig/Lul


 Route


 PRN Reason  Start Time


 Stop Time Status Last Admin


Dose Admin


 


 Amlodipine


 Besylate


  (Norvasc)  10 mg  DAILY


 ORAL


   7/27/19 09:00


 8/26/19 08:59  8/24/19 09:27


 


 


 Cholestyramine


 Resin


  (Questran)  4 gm  TIDPRN  PRN


 ORAL


 Diarrhea  8/15/19 12:15


 9/14/19 12:14  8/21/19 14:15


 


 


 Escitalopram


 Oxalate


  (Lexapro)  10 mg  DAILY


 ORAL


   8/2/19 09:00


 9/1/19 08:59  8/24/19 09:27


 


 


 Finasteride


  (Proscar)  5 mg  DAILY


 ORAL


   8/12/19 09:00


 9/11/19 08:59  8/25/19 08:43


 


 


 Lorazepam


  (Ativan)  2 mg  Q6H  PRN


 ORAL


 anxiety, agitation  8/18/19 13:45


 8/25/19 13:44   


 


 


 Magnesium Oxide


  (Mag-Ox 400mg)  400 mg  THREE TIMES A  DAY


 ORAL


   7/30/19 13:00


 8/29/19 12:59  8/24/19 17:20


 


 


 Risperidone


  (RisperDAL)  2 mg  BEDTIME


 ORAL


   8/9/19 21:00


 9/2/19 20:59  8/24/19 20:36


 


 


 Tamsulosin HCl


  (Flomax)  0.4 mg  BID


 ORAL


   8/24/19 18:00


 9/23/19 17:59  8/24/19 17:20


 


 


 Thiamine HCl


  (Vitamin B1)  100 mg  DAILY


 ORAL


   8/2/19 09:00


 9/1/19 08:59  8/25/19 08:43


 


 


 Vancomycin HCl


  (Firvanq)  250 mg  FOUR TIMES A  DAY


 ORAL


   8/22/19 14:00


 8/25/19 13:59  8/24/19 20:36


 








Height (Feet):  5


Height (Inches):  10.00


Weight (Pounds):  183


Objective


 exam stable





last renal u/s negative for hydro











Israel Pink MD Aug 25, 2019 09:24

## 2019-08-25 NOTE — PROGRESS NOTE
DATE:  08/22/2019

INTERNAL MEDICINE PROGRESS NOTE



SUBJECTIVE:  The patient is seen and evaluated.  Chart reviewed.  Discussed

with staff and consultants.



Vitals are stable.  Exam is unchanged.



PLAN:

1. Plan of care in place.

2. Awaiting transfer to higher level of care as previously noted.









  ______________________________________________

  Peter Bolaños M.D.





DR:  FREDI

D:  08/25/2019 13:34

T:  08/26/2019 03:15

JOB#:  [id number]

CC:

## 2019-08-25 NOTE — PROGRESS NOTE
DATE:  08/21/2019

INTERNAL MEDICINE PROGRESS NOTE



SUBJECTIVE:  Case reviewed with the urologist.  The patient is having loose

stools and is on oral therapy for C. difficile as well as _____ agents.

Oral intake is adequate, but at times not enough to meet losses.



PHYSICAL EXAMINATION:  Vitals are stable.  Exam is unchanged.



IMPRESSION:

1. Needs higher level of care for management of severe nephrolithiasis

with bladder outlet obstruction.

2. Staghorn calculus and multiple other stones.

3. Also needs completion of oral antimicrobials for C. difficile and

continued observation of metabolic parameters and adjustment of IV to meet

GI losses.









  ______________________________________________

  Peter Bolaños M.D.





DR:  FREDI

D:  08/25/2019 13:33

T:  08/25/2019 15:16

JOB#:  [id number]

CC:

## 2019-08-25 NOTE — PROGRESS NOTE
DATE:  08/23/2019

INTERNAL MEDICINE PROGRESS NOTE



SUBJECTIVE:  The patient was seen and evaluated.  Case discussed with

staff.  No new distress.  Occasional loose stools.  Intake is fair.



PHYSICAL EXAMINATION:

VITAL SIGNS:  Blood pressure 120/70, pulse 76, and respirations 18.

LUNGS:  Clear.

ABDOMEN:  Soft, nontender.

CARDIAC:  Regular.  No new murmur.

EXTREMITIES:  No edema.



LABORATORY DATA:  Notable for rising BUN and creatinine 21/1.4.  Potassium

4.3.  White count 8.9 and hemoglobin 13.3.



IMPRESSION:

1. Obstructive uropathy.

2. Staghorn renal calculus.

3. Nephrolithiasis.

4. Urolithiasis.

5. Bladder outlet obstruction.

6. Acute on chronic renal failure.

7. C. difficile colitis.



PLAN:

1. Complete antimicrobials.

2. Restart IV fluids if renal function continues to deteriorate.

3. Await transfer to a higher level of care for definitive urologic

intervention.









  ______________________________________________

  Peter Bolaños M.D.





DR:  FREDI

D:  08/25/2019 13:26

T:  08/26/2019 02:28

JOB#:  [id number]

CC:

## 2019-08-26 VITALS — DIASTOLIC BLOOD PRESSURE: 67 MMHG | SYSTOLIC BLOOD PRESSURE: 105 MMHG

## 2019-08-26 VITALS — DIASTOLIC BLOOD PRESSURE: 73 MMHG | SYSTOLIC BLOOD PRESSURE: 121 MMHG

## 2019-08-26 VITALS — DIASTOLIC BLOOD PRESSURE: 86 MMHG | SYSTOLIC BLOOD PRESSURE: 145 MMHG

## 2019-08-26 VITALS — SYSTOLIC BLOOD PRESSURE: 137 MMHG | DIASTOLIC BLOOD PRESSURE: 78 MMHG

## 2019-08-26 VITALS — SYSTOLIC BLOOD PRESSURE: 143 MMHG | DIASTOLIC BLOOD PRESSURE: 81 MMHG

## 2019-08-26 VITALS — DIASTOLIC BLOOD PRESSURE: 73 MMHG | SYSTOLIC BLOOD PRESSURE: 129 MMHG

## 2019-08-26 LAB
ADD MANUAL DIFF: NO
ANION GAP SERPL CALC-SCNC: 9 MMOL/L (ref 5–15)
BASOPHILS NFR BLD AUTO: 2.5 % (ref 0–2)
BUN SERPL-MCNC: 19 MG/DL (ref 7–18)
CALCIUM SERPL-MCNC: 8.9 MG/DL (ref 8.5–10.1)
CHLORIDE SERPL-SCNC: 107 MMOL/L (ref 98–107)
CO2 SERPL-SCNC: 25 MMOL/L (ref 21–32)
CREAT SERPL-MCNC: 1.2 MG/DL (ref 0.55–1.3)
EOSINOPHIL NFR BLD AUTO: 5.1 % (ref 0–3)
ERYTHROCYTE [DISTWIDTH] IN BLOOD BY AUTOMATED COUNT: 14.4 % (ref 11.6–14.8)
HCT VFR BLD CALC: 41.5 % (ref 42–52)
HGB BLD-MCNC: 13.5 G/DL (ref 14.2–18)
LYMPHOCYTES NFR BLD AUTO: 30.1 % (ref 20–45)
MCV RBC AUTO: 90 FL (ref 80–99)
MONOCYTES NFR BLD AUTO: 12.7 % (ref 1–10)
NEUTROPHILS NFR BLD AUTO: 49.6 % (ref 45–75)
PLATELET # BLD: 206 K/UL (ref 150–450)
POTASSIUM SERPL-SCNC: 4.2 MMOL/L (ref 3.5–5.1)
RBC # BLD AUTO: 4.62 M/UL (ref 4.7–6.1)
SODIUM SERPL-SCNC: 141 MMOL/L (ref 136–145)
WBC # BLD AUTO: 6.5 K/UL (ref 4.8–10.8)

## 2019-08-26 RX ADMIN — TAMSULOSIN HYDROCHLORIDE SCH MG: 0.4 CAPSULE ORAL at 08:43

## 2019-08-26 RX ADMIN — Medication SCH MG: at 08:44

## 2019-08-26 RX ADMIN — TAMSULOSIN HYDROCHLORIDE SCH MG: 0.4 CAPSULE ORAL at 17:04

## 2019-08-26 RX ADMIN — MAGNESIUM OXIDE TAB 400 MG (241.3 MG ELEMENTAL MG) SCH MG: 400 (241.3 MG) TAB at 08:44

## 2019-08-26 RX ADMIN — MAGNESIUM OXIDE TAB 400 MG (241.3 MG ELEMENTAL MG) SCH MG: 400 (241.3 MG) TAB at 13:35

## 2019-08-26 RX ADMIN — MAGNESIUM OXIDE TAB 400 MG (241.3 MG ELEMENTAL MG) SCH MG: 400 (241.3 MG) TAB at 17:04

## 2019-08-26 NOTE — UROLOGY PROGRESS NOTE
Assessment/Plan


Status:  stable, progressing


Assessment/Plan:


1. Staghorn renal calculus.


2. Bladder calculus.


3. Mild hydronephrosis, likely chronic.


4. Chronic kidney disease.


5. Acute kidney injury.


6. Hematuria.


7. Pyuria, poss UTI.


8. Urinary retention.


9. Neurogenic bladder.


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





monitor clinically


pierre out and voiding


monitor renal fxn, labile


s/p abx


flomax BID resumed


cont proscar


pt with very significant and complex stone burden


will likely need multiple complicated urologic procedures with close follow up


best course of action would be higher lever of care at [facility] facility





Subjective


Allergies:  


Coded Allergies:  


     No Known Allergies (Unverified , 7/23/19)


Subjective


all noted, voiding, looks comfortable





Objective





Last 24 Hour Vital Signs








  Date Time  Temp Pulse Resp B/P (MAP) Pulse Ox O2 Delivery O2 Flow Rate FiO2


 


8/26/19 04:00 97.0 84 19 105/67 (80) 97   


 


8/26/19 00:00 98.2 63 19 145/86 (105) 95   


 


8/25/19 21:00      Room Air  


 


8/25/19 20:00 98.1 67 19 133/81 (98) 95   


 


8/25/19 17:43      Room Air  


 


8/25/19 16:00 98.0 78 19 137/81 (99) 98   


 


8/25/19 12:00 97.9 74 19 130/78 (95) 98   


 


8/25/19 09:00      Room Air  


 


8/25/19 09:00  86  119/70    

















Intake and Output  


 


 8/25/19 8/26/19





 19:00 07:00


 


Intake Total 1200 ml 2100 ml


 


Balance 1200 ml 2100 ml


 


  


 


Intake Oral 1200 ml 1200 ml


 


Other  900 ml


 


# Voids 6 2


 


# Bowel Movements 2 1











Microbiology








 Date/Time


Source Procedure


Growth Status


 


 


 7/23/19 14:50


Nasal Nares MRSA Culture - Final


Staphylococcus Aureus - Mrsa Complete


 


 7/25/19 04:45


Stool Clostridium difficile Toxin Assay - Final Complete


 


 8/8/19 23:00


Urine,Clean Catch Urine Culture - Final


NO GROWTH AFTER 48 HOURS Complete


 


 7/23/19 14:50


Rectum VRE Culture - Final


NO VANCOMYCIN RESISTANT ENTEROCOCCUS ... Complete








Current Medications








 Medications


  (Trade)  Dose


 Ordered  Sig/Lul


 Route


 PRN Reason  Start Time


 Stop Time Status Last Admin


Dose Admin


 


 Amlodipine


 Besylate


  (Norvasc)  10 mg  DAILY


 ORAL


   7/27/19 09:00


 8/26/19 08:59  8/25/19 09:00


 


 


 Cholestyramine


 Resin


  (Questran)  4 gm  TIDPRN  PRN


 ORAL


 Diarrhea  8/15/19 12:15


 9/14/19 12:14  8/21/19 14:15


 


 


 Escitalopram


 Oxalate


  (Lexapro)  10 mg  DAILY


 ORAL


   8/2/19 09:00


 9/1/19 08:59  8/25/19 09:00


 


 


 Finasteride


  (Proscar)  5 mg  DAILY


 ORAL


   8/12/19 09:00


 9/11/19 08:59  8/25/19 08:43


 


 


 Magnesium Oxide


  (Mag-Ox 400mg)  400 mg  THREE TIMES A  DAY


 ORAL


   7/30/19 13:00


 8/29/19 12:59  8/25/19 17:22


 


 


 Risperidone


  (RisperDAL)  2 mg  BEDTIME


 ORAL


   8/9/19 21:00


 9/2/19 20:59  8/25/19 20:14


 


 


 Tamsulosin HCl


  (Flomax)  0.4 mg  BID


 ORAL


   8/24/19 18:00


 9/23/19 17:59  8/25/19 17:22


 


 


 Thiamine HCl


  (Vitamin B1)  100 mg  DAILY


 ORAL


   8/2/19 09:00


 9/1/19 08:59  8/25/19 08:43


 





Laboratory Tests


8/26/19 05:37: 


White Blood Count 6.5, Red Blood Count 4.62L, Hemoglobin 13.5L, Hematocrit 41.5L

, Mean Corpuscular Volume 90, Mean Corpuscular Hemoglobin 29.2, Mean 

Corpuscular Hemoglobin Concent 32.5, Red Cell Distribution Width 14.4, Platelet 

Count 206, Mean Platelet Volume 6.7, Neutrophils (%) (Auto) 49.6, Lymphocytes (%

) (Auto) 30.1, Monocytes (%) (Auto) 12.7H, Eosinophils (%) (Auto) 5.1H, 

Basophils (%) (Auto) 2.5H, Sodium Level 141, Potassium Level 4.2, Chloride 

Level 107, Carbon Dioxide Level 25, Anion Gap 9, Blood Urea Nitrogen 19H, 

Creatinine 1.2, Estimat Glomerular Filtration Rate > 60, Glucose Level 93, 

Calcium Level 8.9, Magnesium Level 2.0


Height (Feet):  5


Height (Inches):  10.00


Weight (Pounds):  183


Objective


 exam stable





last renal u/s negative for hydro











Israel Pink MD Aug 26, 2019 08:03

## 2019-08-27 VITALS — DIASTOLIC BLOOD PRESSURE: 84 MMHG | SYSTOLIC BLOOD PRESSURE: 131 MMHG

## 2019-08-27 VITALS — SYSTOLIC BLOOD PRESSURE: 140 MMHG | DIASTOLIC BLOOD PRESSURE: 87 MMHG

## 2019-08-27 VITALS — SYSTOLIC BLOOD PRESSURE: 128 MMHG | DIASTOLIC BLOOD PRESSURE: 78 MMHG

## 2019-08-27 VITALS — DIASTOLIC BLOOD PRESSURE: 75 MMHG | SYSTOLIC BLOOD PRESSURE: 139 MMHG

## 2019-08-27 VITALS — DIASTOLIC BLOOD PRESSURE: 94 MMHG | SYSTOLIC BLOOD PRESSURE: 154 MMHG

## 2019-08-27 VITALS — DIASTOLIC BLOOD PRESSURE: 81 MMHG | SYSTOLIC BLOOD PRESSURE: 132 MMHG

## 2019-08-27 RX ADMIN — MAGNESIUM OXIDE TAB 400 MG (241.3 MG ELEMENTAL MG) SCH MG: 400 (241.3 MG) TAB at 12:17

## 2019-08-27 RX ADMIN — TAMSULOSIN HYDROCHLORIDE SCH MG: 0.4 CAPSULE ORAL at 08:37

## 2019-08-27 RX ADMIN — CHOLESTYRAMINE PRN GM: 4 POWDER, FOR SUSPENSION ORAL at 23:00

## 2019-08-27 RX ADMIN — MAGNESIUM OXIDE TAB 400 MG (241.3 MG ELEMENTAL MG) SCH MG: 400 (241.3 MG) TAB at 08:37

## 2019-08-27 RX ADMIN — Medication SCH MG: at 08:37

## 2019-08-27 RX ADMIN — MAGNESIUM OXIDE TAB 400 MG (241.3 MG ELEMENTAL MG) SCH MG: 400 (241.3 MG) TAB at 17:00

## 2019-08-27 RX ADMIN — TAMSULOSIN HYDROCHLORIDE SCH MG: 0.4 CAPSULE ORAL at 17:00

## 2019-08-27 NOTE — PROGRESS NOTE
DATE:  08/26/2019

INTERNAL MEDICINE PROGRESS NOTE



SUBJECTIVE:  Discharge planning efforts continue.   The patient continues

to have adequate urine output.  Diarrhea has decreased.  He is off

antimicrobials.



OBJECTIVE:

VITAL SIGNS:  Stable.

LUNGS:  Clear.

ABDOMEN:  Soft.  There is no CVA tenderness.

EXTREMITIES:  Without edema.



LABORATORY DATA:  White count 6.5 and hemoglobin 13.5.  Potassium 4.2, BUN

19, and creatinine 1.2.



IMPRESSION:  His urologic interventions as previously noted.



PLAN:  Continue current therapy, _____ pending transfer to higher level of

care.









  ______________________________________________

  Peter Bolaños M.D.





DR:  BEBA

D:  08/27/2019 02:34

T:  08/27/2019 02:58

JOB#:  [id number]

CC:

## 2019-08-27 NOTE — UROLOGY PROGRESS NOTE
Assessment/Plan


Status:  stable, progressing


Assessment/Plan:


1. Staghorn renal calculus.


2. Bladder calculus.


3. Mild hydronephrosis, likely chronic.


4. Chronic kidney disease.


5. Acute kidney injury.


6. Hematuria.


7. Pyuria, poss UTI.


8. Urinary retention.


9. Neurogenic bladder.


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





monitor clinically


pierre out and voiding


monitor renal fxn, labile


s/p abx


flomax BID resumed


cont proscar


pt with very significant and complex stone burden


will likely need multiple complicated urologic procedures with close follow up


best course of action would be higher lever of care at [facility] facility





Subjective


Allergies:  


Coded Allergies:  


     No Known Allergies (Unverified , 7/23/19)


Subjective


all noted, voiding, looks comfortable





Objective





Last 24 Hour Vital Signs








  Date Time  Temp Pulse Resp B/P (MAP) Pulse Ox O2 Delivery O2 Flow Rate FiO2


 


8/27/19 16:00 97.9 77 17 139/75 (96) 96   


 


8/27/19 12:00 98.8 82 18 128/78 (95) 100   


 


8/27/19 09:00      Room Air  


 


8/27/19 08:00 98.9 96 17 131/84 (100) 98   


 


8/27/19 04:00 97.6 86 17 140/87 (104) 97   


 


8/27/19 00:00 97.5 77 17 154/94 (114) 97   


 


8/26/19 23:00      Room Air  


 


8/26/19 20:00 97.5 78 17 129/73 (91) 97   

















Intake and Output  


 


 8/26/19 8/27/19





 19:00 07:00


 


Intake Total 900 ml 


 


Balance 900 ml 


 


  


 


Intake Oral 900 ml 


 


# Voids 5 2


 


# Bowel Movements 2 2











Microbiology








 Date/Time


Source Procedure


Growth Status


 


 


 7/23/19 14:50


Nasal Nares MRSA Culture - Final


Staphylococcus Aureus - Mrsa Complete


 


 7/25/19 04:45


Stool Clostridium difficile Toxin Assay - Final Complete


 


 8/8/19 23:00


Urine,Clean Catch Urine Culture - Final


NO GROWTH AFTER 48 HOURS Complete


 


 7/23/19 14:50


Rectum VRE Culture - Final


NO VANCOMYCIN RESISTANT ENTEROCOCCUS ... Complete








Current Medications








 Medications


  (Trade)  Dose


 Ordered  Sig/Lul


 Route


 PRN Reason  Start Time


 Stop Time Status Last Admin


Dose Admin


 


 Cholestyramine


 Resin


  (Questran)  4 gm  TIDPRN  PRN


 ORAL


 Diarrhea  8/15/19 12:15


 9/14/19 12:14  8/21/19 14:15


 


 


 Escitalopram


 Oxalate


  (Lexapro)  10 mg  DAILY


 ORAL


   8/2/19 09:00


 9/1/19 08:59  8/27/19 08:37


 


 


 Finasteride


  (Proscar)  5 mg  DAILY


 ORAL


   8/12/19 09:00


 9/11/19 08:59  8/27/19 08:37


 


 


 Magnesium Oxide


  (Mag-Ox 400mg)  400 mg  THREE TIMES A  DAY


 ORAL


   7/30/19 13:00


 8/29/19 12:59  8/27/19 17:00


 


 


 Risperidone


  (RisperDAL)  2 mg  BEDTIME


 ORAL


   8/9/19 21:00


 9/2/19 20:59  8/26/19 20:46


 


 


 Tamsulosin HCl


  (Flomax)  0.4 mg  BID


 ORAL


   8/24/19 18:00


 9/23/19 17:59  8/27/19 17:00


 


 


 Thiamine HCl


  (Vitamin B1)  100 mg  DAILY


 ORAL


   8/2/19 09:00


 9/1/19 08:59  8/27/19 08:37


 








Height (Feet):  5


Height (Inches):  10.00


Weight (Pounds):  183


Objective


 exam stable





last renal u/s negative for hydro











Israel Pink MD Aug 27, 2019 17:35

## 2019-08-28 VITALS — DIASTOLIC BLOOD PRESSURE: 71 MMHG | SYSTOLIC BLOOD PRESSURE: 136 MMHG

## 2019-08-28 VITALS — SYSTOLIC BLOOD PRESSURE: 150 MMHG | DIASTOLIC BLOOD PRESSURE: 67 MMHG

## 2019-08-28 VITALS — SYSTOLIC BLOOD PRESSURE: 129 MMHG | DIASTOLIC BLOOD PRESSURE: 75 MMHG

## 2019-08-28 VITALS — DIASTOLIC BLOOD PRESSURE: 82 MMHG | SYSTOLIC BLOOD PRESSURE: 125 MMHG

## 2019-08-28 VITALS — SYSTOLIC BLOOD PRESSURE: 121 MMHG | DIASTOLIC BLOOD PRESSURE: 71 MMHG

## 2019-08-28 VITALS — SYSTOLIC BLOOD PRESSURE: 139 MMHG | DIASTOLIC BLOOD PRESSURE: 84 MMHG

## 2019-08-28 RX ADMIN — MAGNESIUM OXIDE TAB 400 MG (241.3 MG ELEMENTAL MG) SCH MG: 400 (241.3 MG) TAB at 18:05

## 2019-08-28 RX ADMIN — LOPERAMIDE HYDROCHLORIDE PRN MG: 2 CAPSULE ORAL at 20:08

## 2019-08-28 RX ADMIN — Medication SCH MG: at 08:06

## 2019-08-28 RX ADMIN — MAGNESIUM OXIDE TAB 400 MG (241.3 MG ELEMENTAL MG) SCH MG: 400 (241.3 MG) TAB at 12:55

## 2019-08-28 RX ADMIN — TAMSULOSIN HYDROCHLORIDE SCH MG: 0.4 CAPSULE ORAL at 08:06

## 2019-08-28 RX ADMIN — TAMSULOSIN HYDROCHLORIDE SCH MG: 0.4 CAPSULE ORAL at 18:05

## 2019-08-28 RX ADMIN — MAGNESIUM OXIDE TAB 400 MG (241.3 MG ELEMENTAL MG) SCH MG: 400 (241.3 MG) TAB at 08:06

## 2019-08-28 NOTE — PROGRESS NOTE
DATE:  08/27/2019

INTERNAL MEDICINE PROGRESS NOTE



SUBJECTIVE:  The patient is voiding well.  He had 2 stools today.  Over the

past 4 days, he has had 2 to 4 stools daily.



OBJECTIVE:

VITAL SIGNS:  His vitals are stable.

LUNGS:  Clear.

CARDIAC:  Regular.

ABDOMEN:  Soft.  No CVA tenderness.

EXTREMITIES:  No edema.



The patient is off IV fluids.



LABORATORY DATA:  Labs from yesterday reflex stabilize renal function.



IMPRESSION AND PLAN:  He is stable for outpatient management.  He is no

longer on antimicrobials.  He is not having significant gastrointestinal

losses in the form of stool.  He will need definitive urologic procedures

at a tertiary care center, however ____ can be scheduled as an outpatient

in the near future.









  ______________________________________________

  Peter Bolaños M.D.





DR:  MARA

D:  08/28/2019 02:07

T:  08/28/2019 02:44

JOB#:  [id number]

CC:

## 2019-08-28 NOTE — UROLOGY PROGRESS NOTE
Assessment/Plan


Status:  stable, progressing


Assessment/Plan:


1. Staghorn renal calculus.


2. Bladder calculus.


3. Mild hydronephrosis, likely chronic.


4. Chronic kidney disease.


5. Acute kidney injury.


6. Hematuria.


7. Pyuria, poss UTI.


8. Urinary retention.


9. Neurogenic bladder.


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





monitor clinically


pierre out and voiding


monitor renal fxn, labile


s/p abx


flomax BID resumed


cont proscar


pt with very significant and complex stone burden


will likely need multiple complicated urologic procedures with close follow up


best course of action would be higher lever of care at [facility] facility





Subjective


Allergies:  


Coded Allergies:  


     No Known Allergies (Unverified , 7/23/19)


Subjective


all noted, voiding, looks comfortable





Objective





Last 24 Hour Vital Signs








  Date Time  Temp Pulse Resp B/P (MAP) Pulse Ox O2 Delivery O2 Flow Rate FiO2


 


8/28/19 20:36      Room Air  


 


8/28/19 20:14 98.1 75 17 121/71 (88) 98   


 


8/28/19 16:00 98.0 86 17 125/82 (96) 96   


 


8/28/19 12:00 98.1 83 17 136/71 (92) 94   


 


8/28/19 09:00      Room Air  


 


8/28/19 08:07  72 18 150/67 (94) 97   


 


8/28/19 04:00 98.3 61 16 139/84 (102) 98   


 


8/28/19 00:00 98.6 77 18 129/75 (93) 97   


 


8/27/19 22:37      Room Air  

















Intake and Output  


 


 8/27/19 8/28/19





 19:00 07:00


 


Intake Total 1300 ml 120 ml


 


Output Total  1 ml


 


Balance 1300 ml 119 ml


 


  


 


Intake Oral 1300 ml 120 ml


 


Output Urine Total  1 ml


 


# Voids 5 2


 


# Bowel Movements 2 











Microbiology








 Date/Time


Source Procedure


Growth Status


 


 


 7/23/19 14:50


Nasal Nares MRSA Culture - Final


Staphylococcus Aureus - Mrsa Complete


 


 7/25/19 04:45


Stool Clostridium difficile Toxin Assay - Final Complete


 


 8/8/19 23:00


Urine,Clean Catch Urine Culture - Final


NO GROWTH AFTER 48 HOURS Complete


 


 7/23/19 14:50


Rectum VRE Culture - Final


NO VANCOMYCIN RESISTANT ENTEROCOCCUS ... Complete








Current Medications








 Medications


  (Trade)  Dose


 Ordered  Sig/Lul


 Route


 PRN Reason  Start Time


 Stop Time Status Last Admin


Dose Admin


 


 Cholestyramine


 Resin


  (Questran)  4 gm  TID


 ORAL


   8/29/19 09:00


 9/28/19 08:59   


 


 


 Escitalopram


 Oxalate


  (Lexapro)  10 mg  DAILY


 ORAL


   8/2/19 09:00


 9/1/19 08:59  8/28/19 08:06


 


 


 Finasteride


  (Proscar)  5 mg  DAILY


 ORAL


   8/12/19 09:00


 9/11/19 08:59  8/28/19 08:06


 


 


 Loperamide HCl


  (Imodium)  2 mg  Q4H  PRN


 ORAL


 Diarrhea  8/28/19 19:00


 9/27/19 18:59  8/28/19 20:08


 


 


 Magnesium Oxide


  (Mag-Ox 400mg)  400 mg  THREE TIMES A  DAY


 ORAL


   7/30/19 13:00


 8/29/19 12:59  8/28/19 18:05


 


 


 Risperidone


  (RisperDAL)  2 mg  BEDTIME


 ORAL


   8/9/19 21:00


 9/2/19 20:59  8/28/19 20:07


 


 


 Tamsulosin HCl


  (Flomax)  0.4 mg  BID


 ORAL


   8/24/19 18:00


 9/23/19 17:59  8/28/19 18:05


 


 


 Thiamine HCl


  (Vitamin B1)  100 mg  DAILY


 ORAL


   8/2/19 09:00


 9/1/19 08:59  8/28/19 08:06


 








Height (Feet):  5


Height (Inches):  10.00


Weight (Pounds):  182


Objective


 exam stable





last renal u/s negative for Israel Juarez MD Aug 28, 2019 20:59

## 2019-08-29 VITALS — DIASTOLIC BLOOD PRESSURE: 75 MMHG | SYSTOLIC BLOOD PRESSURE: 130 MMHG

## 2019-08-29 VITALS — DIASTOLIC BLOOD PRESSURE: 72 MMHG | SYSTOLIC BLOOD PRESSURE: 118 MMHG

## 2019-08-29 VITALS — DIASTOLIC BLOOD PRESSURE: 73 MMHG | SYSTOLIC BLOOD PRESSURE: 125 MMHG

## 2019-08-29 VITALS — SYSTOLIC BLOOD PRESSURE: 140 MMHG | DIASTOLIC BLOOD PRESSURE: 69 MMHG

## 2019-08-29 VITALS — SYSTOLIC BLOOD PRESSURE: 119 MMHG | DIASTOLIC BLOOD PRESSURE: 79 MMHG

## 2019-08-29 RX ADMIN — LOPERAMIDE HYDROCHLORIDE PRN MG: 2 CAPSULE ORAL at 17:05

## 2019-08-29 RX ADMIN — CHOLESTYRAMINE SCH GM: 4 POWDER, FOR SUSPENSION ORAL at 13:13

## 2019-08-29 RX ADMIN — CHOLESTYRAMINE SCH GM: 4 POWDER, FOR SUSPENSION ORAL at 17:05

## 2019-08-29 RX ADMIN — MAGNESIUM OXIDE TAB 400 MG (241.3 MG ELEMENTAL MG) SCH MG: 400 (241.3 MG) TAB at 08:06

## 2019-08-29 RX ADMIN — TAMSULOSIN HYDROCHLORIDE SCH MG: 0.4 CAPSULE ORAL at 08:06

## 2019-08-29 RX ADMIN — TAMSULOSIN HYDROCHLORIDE SCH MG: 0.4 CAPSULE ORAL at 17:05

## 2019-08-29 RX ADMIN — CHOLESTYRAMINE SCH GM: 4 POWDER, FOR SUSPENSION ORAL at 08:06

## 2019-08-29 RX ADMIN — Medication SCH MG: at 08:06

## 2019-08-29 NOTE — INFECTIOUS DISEASES PROG NOTE
Assessment/Plan


Assessment/Plan


A;


1. C. difficile colitis.  He has completed his therapy.


2. MRSA nasal colonization.


3. Hypertension.


4. BPH


4. Nephrolithiasis





PLAN:


1. Continue off antibiotics.


2. No need for isolation for C. difficile colitis.





Subjective


ROS Limited/Unobtainable:  No


Constitutional:  Reports: no symptoms


Respiratory:  Reports: no symptoms


Cardiovascular:  Reports: no symptoms


Gastrointestinal/Abdominal:  Reports: diarrhea


Musculoskeletal:  Reports: pain, other - chronic


Allergies:  


Coded Allergies:  


     No Known Allergies (Unverified , 7/23/19)





Objective


Vital Signs





Last 24 Hour Vital Signs








  Date Time  Temp Pulse Resp B/P (MAP) Pulse Ox O2 Delivery O2 Flow Rate FiO2


 


8/29/19 09:00      Room Air  


 


8/29/19 03:40 98.3 81 17 130/75 (93) 94   


 


8/29/19 00:14 98.7 80 17 125/73 (90) 98   


 


8/28/19 20:36      Room Air  


 


8/28/19 20:14 98.1 75 17 121/71 (88) 98   


 


8/28/19 16:00 98.0 86 17 125/82 (96) 96   








Height (Feet):  5


Height (Inches):  10.00


Weight (Pounds):  182


General Appearance:  no acute distress


HEENT:  other


Respiratory/Chest:  lungs clear


Cardiovascular:  normal rate


Abdomen:  soft, non tender


Genitourinary:  normal external genitalia


Extremities:  no edema


Neurologic/Psychiatric:  alert, responsive





Current Medications








 Medications


  (Trade)  Dose


 Ordered  Sig/Lul


 Route


 PRN Reason  Start Time


 Stop Time Status Last Admin


Dose Admin


 


 Cholestyramine


 Resin


  (Questran)  4 gm  TID


 ORAL


   8/29/19 09:00


 9/28/19 08:59  8/29/19 08:06


 


 


 Escitalopram


 Oxalate


  (Lexapro)  10 mg  DAILY


 ORAL


   8/2/19 09:00


 9/1/19 08:59  8/29/19 08:06


 


 


 Finasteride


  (Proscar)  5 mg  DAILY


 ORAL


   8/12/19 09:00


 9/11/19 08:59  8/29/19 08:06


 


 


 Loperamide HCl


  (Imodium)  2 mg  Q4H  PRN


 ORAL


 Diarrhea  8/28/19 19:00


 9/27/19 18:59  8/28/19 20:08


 


 


 Magnesium Oxide


  (Mag-Ox 400mg)  400 mg  THREE TIMES A  DAY


 ORAL


   7/30/19 13:00


 8/29/19 12:59  8/29/19 08:06


 


 


 Risperidone


  (RisperDAL)  2 mg  BEDTIME


 ORAL


   8/9/19 21:00


 9/2/19 20:59  8/28/19 20:07


 


 


 Tamsulosin HCl


  (Flomax)  0.4 mg  BID


 ORAL


   8/24/19 18:00


 9/23/19 17:59  8/29/19 08:06


 


 


 Thiamine HCl


  (Vitamin B1)  100 mg  DAILY


 ORAL


   8/2/19 09:00


 9/1/19 08:59  8/29/19 08:06


 

















Edgardo Maldonado MD Aug 29, 2019 12:09

## 2019-08-29 NOTE — UROLOGY PROGRESS NOTE
Assessment/Plan


Status:  stable, progressing


Assessment/Plan:


1. Staghorn renal calculus.


2. Bladder calculus.


3. Mild hydronephrosis, likely chronic.


4. Chronic kidney disease.


5. Acute kidney injury.


6. Hematuria.


7. Pyuria, poss UTI.


8. Urinary retention.


9. Neurogenic bladder.


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





monitor clinically


pierre out and voiding


monitor renal fxn, labile


s/p abx


flomax BID resumed


cont proscar


pt with very significant and complex stone burden


will likely need multiple complicated urologic procedures with close follow up


best course of action would be higher lever of care at tertiary [facility] facility





Subjective


Allergies:  


Coded Allergies:  


     No Known Allergies (Unverified , 7/23/19)


Subjective


all noted, voiding, looks comfortable





Objective





Last 24 Hour Vital Signs








  Date Time  Temp Pulse Resp B/P (MAP) Pulse Ox O2 Delivery O2 Flow Rate FiO2


 


8/29/19 03:40 98.3 81 17 130/75 (93) 94   


 


8/29/19 00:14 98.7 80 17 125/73 (90) 98   


 


8/28/19 20:36      Room Air  


 


8/28/19 20:14 98.1 75 17 121/71 (88) 98   


 


8/28/19 16:00 98.0 86 17 125/82 (96) 96   


 


8/28/19 12:00 98.1 83 17 136/71 (92) 94   


 


8/28/19 09:00      Room Air  


 


8/28/19 08:07  72 18 150/67 (94) 97   

















Intake and Output  


 


 8/28/19 8/29/19





 18:59 06:59


 


Intake Total 1100 ml 1000 ml


 


Balance 1100 ml 1000 ml


 


  


 


Intake Oral 1100 ml 1000 ml


 


# Voids 5 4


 


# Bowel Movements 2 1











Microbiology








 Date/Time


Source Procedure


Growth Status


 


 


 7/23/19 14:50


Nasal Nares MRSA Culture - Final


Staphylococcus Aureus - Mrsa Complete


 


 7/25/19 04:45


Stool Clostridium difficile Toxin Assay - Final Complete


 


 8/8/19 23:00


Urine,Clean Catch Urine Culture - Final


NO GROWTH AFTER 48 HOURS Complete


 


 7/23/19 14:50


Rectum VRE Culture - Final


NO VANCOMYCIN RESISTANT ENTEROCOCCUS ... Complete








Current Medications








 Medications


  (Trade)  Dose


 Ordered  Sig/Llu


 Route


 PRN Reason  Start Time


 Stop Time Status Last Admin


Dose Admin


 


 Cholestyramine


 Resin


  (Questran)  4 gm  TID


 ORAL


   8/29/19 09:00


 9/28/19 08:59   


 


 


 Escitalopram


 Oxalate


  (Lexapro)  10 mg  DAILY


 ORAL


   8/2/19 09:00


 9/1/19 08:59  8/28/19 08:06


 


 


 Finasteride


  (Proscar)  5 mg  DAILY


 ORAL


   8/12/19 09:00


 9/11/19 08:59  8/28/19 08:06


 


 


 Loperamide HCl


  (Imodium)  2 mg  Q4H  PRN


 ORAL


 Diarrhea  8/28/19 19:00


 9/27/19 18:59  8/28/19 20:08


 


 


 Magnesium Oxide


  (Mag-Ox 400mg)  400 mg  THREE TIMES A  DAY


 ORAL


   7/30/19 13:00


 8/29/19 12:59  8/28/19 18:05


 


 


 Risperidone


  (RisperDAL)  2 mg  BEDTIME


 ORAL


   8/9/19 21:00


 9/2/19 20:59  8/28/19 20:07


 


 


 Tamsulosin HCl


  (Flomax)  0.4 mg  BID


 ORAL


   8/24/19 18:00


 9/23/19 17:59  8/28/19 18:05


 


 


 Thiamine HCl


  (Vitamin B1)  100 mg  DAILY


 ORAL


   8/2/19 09:00


 9/1/19 08:59  8/28/19 08:06


 








Height (Feet):  5


Height (Inches):  10.00


Weight (Pounds):  182


Objective


 exam stable





last renal u/s negative for hydro











Israel Pink MD Aug 29, 2019 07:29

## 2019-08-29 NOTE — PROGRESS NOTE
DATE:  08/28/2019

INTERNAL MEDICINE PROGRESS NOTE



SUBJECTIVE:  The patient is seen ambulating around the [place]illips independently.

He is requesting more food and juice to drink.  He is able to get out of

bed alone, ambulate with his walker from his room to the nursing station

and back several times without assistance.



The patient continues to have 2 to 3 episodes of loose stools daily.  He

was seen in Infectious Disease consultation today.  It was felt that he

does not require any additional treatment for C. difficile and has

completed adequate therapy and does not need isolation either.



PHYSICAL EXAMINATION:  Without change.  Vitals are reviewed.



IMPRESSION:  Renal function has stabilized.  Oral intake is stable.

Independent function is adequate.  Fall risk is minimal.  MRSA is

colonized only in the nares; this means that it is not active and 

not contagious.  The C. difficile positive toxin has been

treated and isolation is no longer necessary nor additional

antimicrobials.  The patient has significant urologic nephrolithiasis with

involvement of the bladder and some output obstruction; however, his renal

function is stable.  He is voiding independently and there is no urgency

to any urologic intervention presently.  The patient requires an

appointment with his primary care physician, which should be given by his

insurance company.  Phone calls have been made on multiple occasions to

[employer], specifically Mikaela at #[phone] was contacted who

refuses to arrange appointment with the primary care physician or

urologist.  The patient has an active health plan and should be able to

receive an appointment with his primary care physician.  It is unclear to

me why this is being blocked by the health plan.  In addition, multiple

attempts have been made to obtain [facility]  contact for

appropriate referral to a nursing home care facility and this again has not

been followed up by the patient's health plan.



In conclusion, I find the refusal of his health plan to provide the care

that he has been contracted for unacceptable and since the patient has

been medically stable for the last week at least, this is being a

detriment to his further recovery by prolonging his ability to reach a

primary care physician.



I will be instructing the social service and the case management team at

this hospital to refer this case to be the  for the

[facility] as I feel [facility] and [facility] as well have failed

to provide the services that they have been bora to perform for

this patient.









  ______________________________________________

  Peter BRIGIDA Bolaños





DR:  CHEVY

D:  08/29/2019 00:23

T:  08/29/2019 00:34

JOB#:  [id number]

CC:



JIHAN

## 2019-08-30 VITALS — SYSTOLIC BLOOD PRESSURE: 129 MMHG | DIASTOLIC BLOOD PRESSURE: 84 MMHG

## 2019-08-30 VITALS — SYSTOLIC BLOOD PRESSURE: 121 MMHG | DIASTOLIC BLOOD PRESSURE: 83 MMHG

## 2019-08-30 VITALS — SYSTOLIC BLOOD PRESSURE: 149 MMHG | DIASTOLIC BLOOD PRESSURE: 75 MMHG

## 2019-08-30 VITALS — DIASTOLIC BLOOD PRESSURE: 81 MMHG | SYSTOLIC BLOOD PRESSURE: 127 MMHG

## 2019-08-30 VITALS — DIASTOLIC BLOOD PRESSURE: 68 MMHG | SYSTOLIC BLOOD PRESSURE: 128 MMHG

## 2019-08-30 RX ADMIN — TAMSULOSIN HYDROCHLORIDE SCH MG: 0.4 CAPSULE ORAL at 08:36

## 2019-08-30 RX ADMIN — Medication SCH MG: at 08:35

## 2019-08-30 RX ADMIN — CHOLESTYRAMINE SCH GM: 4 POWDER, FOR SUSPENSION ORAL at 08:35

## 2019-08-30 RX ADMIN — LOPERAMIDE HYDROCHLORIDE PRN MG: 2 CAPSULE ORAL at 12:22

## 2019-08-30 RX ADMIN — TAMSULOSIN HYDROCHLORIDE SCH MG: 0.4 CAPSULE ORAL at 18:08

## 2019-08-30 NOTE — DIAGNOSTIC IMAGING REPORT
Indication: TB screen

 

Comparison:  None

 

A single view chest radiograph was obtained.

 

Findings:

 

Cardiomediastinal appearance is within normal limits for age. The lungs are clear.

Pulmonary vascularity is appropriate. The diaphragmatic contour is smooth and

costophrenic angles are sharp. No pleural effusions are identified. The bones are

unremarkable.

 

Impression: Negative exam

## 2019-08-30 NOTE — PROGRESS NOTE
DATE:  08/29/2019

SUBJECTIVE:  Condition remains largely unchanged.  Discharge planning

remains in progress.  Vitals are stable.  Exam unchanged.  We continued to

have difficulty with the patient's insurance company, failing to respond

adequately to request for the discharge disposition and followup needs.

The patient will remain in-house until these issues have been

appropriately resolved.









  ______________________________________________

  Peter Bolaños M.D.





DR:  STEVEN

D:  08/30/2019 02:28

T:  08/30/2019 03:14

JOB#:  [id number]

CC:

## 2019-08-30 NOTE — INFECTIOUS DISEASES PROG NOTE
Assessment/Plan


Assessment/Plan


A;


1. C. difficile colitis.  He has completed his therapy.


2. MRSA nasal colonization.


3. Hypertension.


4. BPH


4. Nephrolithiasis





PLAN:


1. Continue off antibiotics.


2. No need for isolation for C. difficile colitis.





Subjective


ROS Limited/Unobtainable:  Yes


Constitutional:  Denies: fever


Allergies:  


Coded Allergies:  


     No Known Allergies (Unverified , 7/23/19)





Objective


Vital Signs





Last 24 Hour Vital Signs








  Date Time  Temp Pulse Resp B/P (MAP) Pulse Ox O2 Delivery O2 Flow Rate FiO2


 


8/30/19 09:00      Room Air  


 


8/30/19 08:00 98.8 82 17 121/83 (96) 97   


 


8/30/19 04:00 98.2 57 16 128/68 (88) 96   


 


8/30/19 00:00 98.4 66 18 149/75 (99) 96   


 


8/29/19 20:34      Room Air  


 


8/29/19 20:00 98.4 67 20 140/69 (92) 97   


 


8/29/19 15:54 98.9 86 17 119/79 (92) 98   


 


8/29/19 12:00 98.9 80 18 118/72 (87) 99   








Height (Feet):  5


Height (Inches):  10.00


Weight (Pounds):  182


General Appearance:  no acute distress


HEENT:  mucous membranes moist


Respiratory/Chest:  lungs clear


Cardiovascular:  normal rate


Abdomen:  soft, non tender


Extremities:  no edema


Neurologic/Psychiatric:  other - sleeping





Current Medications








 Medications


  (Trade)  Dose


 Ordered  Sig/Lul


 Route


 PRN Reason  Start Time


 Stop Time Status Last Admin


Dose Admin


 


 Cholestyramine


 Resin


  (Questran)  4 gm  TID


 ORAL


   8/29/19 09:00


 9/28/19 08:59  8/30/19 08:35


 


 


 Escitalopram


 Oxalate


  (Lexapro)  10 mg  DAILY


 ORAL


   8/2/19 09:00


 9/1/19 08:59  8/30/19 08:35


 


 


 Finasteride


  (Proscar)  5 mg  DAILY


 ORAL


   8/12/19 09:00


 9/11/19 08:59  8/30/19 08:36


 


 


 Loperamide HCl


  (Imodium)  2 mg  Q4H  PRN


 ORAL


 Diarrhea  8/28/19 19:00


 9/27/19 18:59  8/29/19 17:05


 


 


 Risperidone


  (RisperDAL)  2 mg  BEDTIME


 ORAL


   8/9/19 21:00


 9/2/19 20:59  8/29/19 20:37


 


 


 Tamsulosin HCl


  (Flomax)  0.4 mg  BID


 ORAL


   8/24/19 18:00


 9/23/19 17:59  8/30/19 08:36


 


 


 Thiamine HCl


  (Vitamin B1)  100 mg  DAILY


 ORAL


   8/2/19 09:00


 9/1/19 08:59  8/30/19 08:35


 

















Edgardo Maldonado MD Aug 30, 2019 11:05

## 2019-09-02 ENCOUNTER — HOSPITAL ENCOUNTER (INPATIENT)
Dept: HOSPITAL 72 - EMR | Age: 64
LOS: 44 days | Discharge: NURSING, REHAB OR LONG-TERM CARE | DRG: 421 | End: 2019-10-16
Payer: MEDICAID

## 2019-09-02 VITALS — DIASTOLIC BLOOD PRESSURE: 70 MMHG | SYSTOLIC BLOOD PRESSURE: 130 MMHG

## 2019-09-02 VITALS — SYSTOLIC BLOOD PRESSURE: 144 MMHG | DIASTOLIC BLOOD PRESSURE: 72 MMHG

## 2019-09-02 VITALS — SYSTOLIC BLOOD PRESSURE: 142 MMHG | DIASTOLIC BLOOD PRESSURE: 71 MMHG

## 2019-09-02 VITALS — DIASTOLIC BLOOD PRESSURE: 79 MMHG | SYSTOLIC BLOOD PRESSURE: 153 MMHG

## 2019-09-02 VITALS — HEIGHT: 70 IN | BODY MASS INDEX: 26.34 KG/M2 | WEIGHT: 184 LBS

## 2019-09-02 DIAGNOSIS — N40.0: ICD-10-CM

## 2019-09-02 DIAGNOSIS — N18.3: ICD-10-CM

## 2019-09-02 DIAGNOSIS — I12.9: ICD-10-CM

## 2019-09-02 DIAGNOSIS — F10.21: ICD-10-CM

## 2019-09-02 DIAGNOSIS — F03.91: ICD-10-CM

## 2019-09-02 DIAGNOSIS — G93.40: ICD-10-CM

## 2019-09-02 DIAGNOSIS — N31.9: ICD-10-CM

## 2019-09-02 DIAGNOSIS — R62.7: Primary | ICD-10-CM

## 2019-09-02 DIAGNOSIS — R00.1: ICD-10-CM

## 2019-09-02 DIAGNOSIS — N13.2: ICD-10-CM

## 2019-09-02 LAB
ADD MANUAL DIFF: NO
ALBUMIN SERPL-MCNC: 3.1 G/DL (ref 3.4–5)
ALBUMIN/GLOB SERPL: 0.8 {RATIO} (ref 1–2.7)
ALP SERPL-CCNC: 72 U/L (ref 46–116)
ALT SERPL-CCNC: 78 U/L (ref 12–78)
ANION GAP SERPL CALC-SCNC: 12 MMOL/L (ref 5–15)
APPEARANCE UR: (no result)
APTT PPP: (no result) S
AST SERPL-CCNC: 60 U/L (ref 15–37)
BASOPHILS NFR BLD AUTO: 1.5 % (ref 0–2)
BILIRUB SERPL-MCNC: 0.9 MG/DL (ref 0.2–1)
BUN SERPL-MCNC: 26 MG/DL (ref 7–18)
CALCIUM SERPL-MCNC: 8.9 MG/DL (ref 8.5–10.1)
CHLORIDE SERPL-SCNC: 109 MMOL/L (ref 98–107)
CO2 SERPL-SCNC: 23 MMOL/L (ref 21–32)
CREAT SERPL-MCNC: 1.4 MG/DL (ref 0.55–1.3)
EOSINOPHIL NFR BLD AUTO: 3.1 % (ref 0–3)
ERYTHROCYTE [DISTWIDTH] IN BLOOD BY AUTOMATED COUNT: 13.5 % (ref 11.6–14.8)
GLOBULIN SER-MCNC: 4.1 G/DL
GLUCOSE UR STRIP-MCNC: NEGATIVE MG/DL
HCT VFR BLD CALC: 31.9 % (ref 42–52)
HGB BLD-MCNC: 11.4 G/DL (ref 14.2–18)
KETONES UR QL STRIP: NEGATIVE
LEUKOCYTE ESTERASE UR QL STRIP: (no result)
LYMPHOCYTES NFR BLD AUTO: 12.6 % (ref 20–45)
MCV RBC AUTO: 84 FL (ref 80–99)
MONOCYTES NFR BLD AUTO: 8.4 % (ref 1–10)
NEUTROPHILS NFR BLD AUTO: 74.3 % (ref 45–75)
NITRITE UR QL STRIP: POSITIVE
PH UR STRIP: 6 [PH] (ref 4.5–8)
PLATELET # BLD: 169 K/UL (ref 150–450)
POTASSIUM SERPL-SCNC: 4 MMOL/L (ref 3.5–5.1)
PROT UR QL STRIP: (no result)
RBC # BLD AUTO: 3.79 M/UL (ref 4.7–6.1)
SODIUM SERPL-SCNC: 144 MMOL/L (ref 136–145)
SP GR UR STRIP: 1.01 (ref 1–1.03)
UROBILINOGEN UR-MCNC: NORMAL MG/DL (ref 0–1)
WBC # BLD AUTO: 10 K/UL (ref 4.8–10.8)

## 2019-09-02 PROCEDURE — 80048 BASIC METABOLIC PNL TOTAL CA: CPT

## 2019-09-02 PROCEDURE — 84439 ASSAY OF FREE THYROXINE: CPT

## 2019-09-02 PROCEDURE — 87081 CULTURE SCREEN ONLY: CPT

## 2019-09-02 PROCEDURE — 85025 COMPLETE CBC W/AUTO DIFF WBC: CPT

## 2019-09-02 PROCEDURE — 94664 DEMO&/EVAL PT USE INHALER: CPT

## 2019-09-02 PROCEDURE — 96372 THER/PROPH/DIAG INJ SC/IM: CPT

## 2019-09-02 PROCEDURE — 87181 SC STD AGAR DILUTION PER AGT: CPT

## 2019-09-02 PROCEDURE — 84443 ASSAY THYROID STIM HORMONE: CPT

## 2019-09-02 PROCEDURE — 82962 GLUCOSE BLOOD TEST: CPT

## 2019-09-02 PROCEDURE — 87086 URINE CULTURE/COLONY COUNT: CPT

## 2019-09-02 PROCEDURE — 96361 HYDRATE IV INFUSION ADD-ON: CPT

## 2019-09-02 PROCEDURE — 36415 COLL VENOUS BLD VENIPUNCTURE: CPT

## 2019-09-02 PROCEDURE — 96360 HYDRATION IV INFUSION INIT: CPT

## 2019-09-02 PROCEDURE — 83735 ASSAY OF MAGNESIUM: CPT

## 2019-09-02 PROCEDURE — 83690 ASSAY OF LIPASE: CPT

## 2019-09-02 PROCEDURE — 99285 EMERGENCY DEPT VISIT HI MDM: CPT

## 2019-09-02 PROCEDURE — 93005 ELECTROCARDIOGRAM TRACING: CPT

## 2019-09-02 PROCEDURE — 81003 URINALYSIS AUTO W/O SCOPE: CPT

## 2019-09-02 PROCEDURE — 80053 COMPREHEN METABOLIC PANEL: CPT

## 2019-09-02 RX ADMIN — HEPARIN SODIUM SCH UNITS: 5000 INJECTION INTRAVENOUS; SUBCUTANEOUS at 21:39

## 2019-09-02 RX ADMIN — DOCUSATE SODIUM SCH MG: 100 CAPSULE, LIQUID FILLED ORAL at 21:00

## 2019-09-02 NOTE — NUR
ED Nurse Note:

RECEIVED PATIENT FROM ASHTYN GUALLPA. PATIENT SLEEPING COMFORTABLY WITH NAD. 
VSS. RESPIRATIONS EVEN AND UNLABORED.

## 2019-09-02 NOTE — NUR
TRANSFER TO FLOOR:

Patient transferred to [facility] as ordered, per MD DEAN.   Report given to 
SALONI HAMEED. BELONGINGS SENT WITH PATIENT. BELONGINGS LIST COMPLETED WITH 
RECEIVING RN. CRE VRE SWAB COLLECTED; SENT DOWN TO LAB.

## 2019-09-02 NOTE — NUR
ED Nurse Note:



Pt brought in by EMS for evaluation. [place] [facility] [place] [facility] and reports that he is 
incontinent and unable to care for himself thus the shelter refused to 
accomodate pt. AAO x4, follows commands but noted to be resltess and 
uncooperative. No respiratory distress.

## 2019-09-02 NOTE — NUR
NURSE NOTES:

Received report from Blayne Negron RN in ER. Patient arrived on the unit by konstantin. Patient is 
sleeping. On room air with no signs of respiratory distress. No c/o of pain at this time. IV 
in the right hand 24g intact and running fluids. Belongings list checked and belongings 
accounted for. Skin intact. Bed is locked and in the lowest position. Call light in reach. 
Orders put in by MD. Will follow plan of care as ordered. 



VS -BP:153/79  O2:95%  RR:18  HR:69  Temp:98.2

## 2019-09-02 NOTE — EMERGENCY ROOM REPORT
History of Present Illness


General


Chief Complaint:  General Complaint


Source:  Patient, Medical Record





Present Illness


HPI


64-year-old male presents with itchy but chronic in nature has been ongoing for 

2 days, he states is worse with feces alleviated when someone cleans him, no 

fever no chills no chest pain or shortness of breath, patient was sent from 

[facility], they drove him to this hospital they stated they 

could not take care of him he was smearing his feces all over the wall they 

state no one can change him severity is severe symptoms are constant.


Allergies:  


Coded Allergies:  


     No Known Allergies (Unverified , 7/23/19)





Patient History


Past Medical History:  see triage record


Reviewed Nursing Documentation:  PMH: Agreed; PSxH: Agreed





Nursing Documentation-PMH


Past Medical History:  No History, Except For


Hx Cardiac Problems:  Yes


Hx Hypertension:  Yes


Hx Cancer:  No


Hx Gastrointestinal Problems:  No


Hx Neurological Problems:  No





Review of Systems


All Other Systems:  negative except mentioned in HPI





Physical Exam





Vital Signs








  Date Time  Temp Pulse Resp B/P (MAP) Pulse Ox O2 Delivery O2 Flow Rate FiO2


 


9/2/19 15:14 97.2 72 20 142/71 (94) 94 Room Air  








Sp02 EP Interpretation:  reviewed, normal


General Appearance:  well appearing, no apparent distress, alert


Head:  normocephalic, atraumatic


Eyes:  bilateral eye PERRL, bilateral eye EOMI


ENT:  uvula midline, moist mucus membranes


Neck:  supple, thyroid normal, supple/symm/no masses


Respiratory:  lungs clear, no respiratory distress, no retraction, no accessory 

muscle use


Cardiovascular #1:  normal peripheral pulses, regular rate, rhythm, no edema, 

no gallop, no murmur


Gastrointestinal:  non tender, soft, no guarding, no rebound


Rectal:  other - Feces, diaper rash


Musculoskeletal:  normal inspection


Neurologic:  alert, oriented x3


Psychiatric:  mood/affect normal


Skin:  no rash, warm/dry, other - feces on the hand





Medical Decision Making


Diagnostic Impression:  


 Primary Impression:  


 Failure to thrive


 Qualified Codes:  R62.7 - Adult failure to thrive


 Additional Impression:  


 Gravely disabled


ER Course


64-year-old male presents with grave disability, failure to thrive.


Patient is unable to care for himself, will admit patient for placement as well 

as failure to thrive





Laboratory Tests








Test


  9/2/19


15:40


 


White Blood Count


  10.0 K/UL


(4.8-10.8)


 


Red Blood Count


  3.79 M/UL


(4.70-6.10)  L


 


Hemoglobin


  11.4 G/DL


(14.2-18.0)  L


 


Hematocrit


  31.9 %


(42.0-52.0)  L


 


Mean Corpuscular Volume 84 FL (80-99)  


 


Mean Corpuscular Hemoglobin


  30.0 PG


(27.0-31.0)


 


Mean Corpuscular Hemoglobin


Concent 35.6 G/DL


(32.0-36.0)


 


Red Cell Distribution Width


  13.5 %


(11.6-14.8)


 


Platelet Count


  169 K/UL


(150-450)


 


Mean Platelet Volume


  6.5 FL


(6.5-10.1)


 


Neutrophils (%) (Auto)


  74.3 %


(45.0-75.0)


 


Lymphocytes (%) (Auto)


  12.6 %


(20.0-45.0)  L


 


Monocytes (%) (Auto)


  8.4 %


(1.0-10.0)


 


Eosinophils (%) (Auto)


  3.1 %


(0.0-3.0)  H


 


Basophils (%) (Auto)


  1.5 %


(0.0-2.0)


 


Sodium Level


  144 MMOL/L


(136-145)


 


Potassium Level


  4.0 MMOL/L


(3.5-5.1)


 


Chloride Level


  109 MMOL/L


()  H


 


Carbon Dioxide Level


  23 MMOL/L


(21-32)


 


Anion Gap


  12 mmol/L


(5-15)


 


Blood Urea Nitrogen


  26 mg/dL


(7-18)  H


 


Creatinine


  1.4 MG/DL


(0.55-1.30)  H


 


Estimate Glomerular


Filtration Rate 50.9 mL/min


(>60)


 


Glucose Level


  108 MG/DL


()  H


 


Calcium Level


  8.9 MG/DL


(8.5-10.1)


 


Total Bilirubin


  0.9 MG/DL


(0.2-1.0)


 


Aspartate Amino Transferase


(AST) 60 U/L (15-37)


H


 


Alanine Aminotransferase (ALT)


  78 U/L (12-78)


 


 


Alkaline Phosphatase


  72 U/L


()


 


Total Protein


  7.2 G/DL


(6.4-8.2)


 


Albumin


  3.1 G/DL


(3.4-5.0)  L


 


Globulin 4.1 g/dL  


 


Albumin/Globulin Ratio


  0.8 (1.0-2.7)


L


 


Lipase


  446 U/L


()  H











Last Vital Signs








  Date Time  Temp Pulse Resp B/P (MAP) Pulse Ox O2 Delivery O2 Flow Rate FiO2


 


9/2/19 15:14 97.2 72 20 142/71 (94) 94 Room Air  








Disposition:  ADMITTED AS INPATIENT


Condition:  Stable











Radu Blas MD Sep 2, 2019 15:32

## 2019-09-03 VITALS — DIASTOLIC BLOOD PRESSURE: 85 MMHG | SYSTOLIC BLOOD PRESSURE: 151 MMHG

## 2019-09-03 VITALS — DIASTOLIC BLOOD PRESSURE: 80 MMHG | SYSTOLIC BLOOD PRESSURE: 152 MMHG

## 2019-09-03 VITALS — DIASTOLIC BLOOD PRESSURE: 94 MMHG | SYSTOLIC BLOOD PRESSURE: 147 MMHG

## 2019-09-03 VITALS — SYSTOLIC BLOOD PRESSURE: 130 MMHG | DIASTOLIC BLOOD PRESSURE: 73 MMHG

## 2019-09-03 VITALS — SYSTOLIC BLOOD PRESSURE: 142 MMHG | DIASTOLIC BLOOD PRESSURE: 84 MMHG

## 2019-09-03 VITALS — DIASTOLIC BLOOD PRESSURE: 90 MMHG | SYSTOLIC BLOOD PRESSURE: 154 MMHG

## 2019-09-03 LAB
ADD MANUAL DIFF: NO
ALBUMIN SERPL-MCNC: 3.2 G/DL (ref 3.4–5)
ALBUMIN/GLOB SERPL: 0.8 {RATIO} (ref 1–2.7)
ALP SERPL-CCNC: 71 U/L (ref 46–116)
ALT SERPL-CCNC: 74 U/L (ref 12–78)
ANION GAP SERPL CALC-SCNC: 13 MMOL/L (ref 5–15)
AST SERPL-CCNC: 66 U/L (ref 15–37)
BASOPHILS NFR BLD AUTO: 1.9 % (ref 0–2)
BILIRUB SERPL-MCNC: 0.8 MG/DL (ref 0.2–1)
BUN SERPL-MCNC: 25 MG/DL (ref 7–18)
CALCIUM SERPL-MCNC: 9 MG/DL (ref 8.5–10.1)
CHLORIDE SERPL-SCNC: 109 MMOL/L (ref 98–107)
CO2 SERPL-SCNC: 21 MMOL/L (ref 21–32)
CREAT SERPL-MCNC: 1.5 MG/DL (ref 0.55–1.3)
EOSINOPHIL NFR BLD AUTO: 3.8 % (ref 0–3)
ERYTHROCYTE [DISTWIDTH] IN BLOOD BY AUTOMATED COUNT: 14.4 % (ref 11.6–14.8)
GLOBULIN SER-MCNC: 4 G/DL
HCT VFR BLD CALC: 34.9 % (ref 42–52)
HGB BLD-MCNC: 11.3 G/DL (ref 14.2–18)
LYMPHOCYTES NFR BLD AUTO: 29.6 % (ref 20–45)
MCV RBC AUTO: 90 FL (ref 80–99)
MONOCYTES NFR BLD AUTO: 11 % (ref 1–10)
NEUTROPHILS NFR BLD AUTO: 53.8 % (ref 45–75)
PLATELET # BLD: 184 K/UL (ref 150–450)
POTASSIUM SERPL-SCNC: 4.3 MMOL/L (ref 3.5–5.1)
RBC # BLD AUTO: 3.86 M/UL (ref 4.7–6.1)
SODIUM SERPL-SCNC: 143 MMOL/L (ref 136–145)
WBC # BLD AUTO: 7.7 K/UL (ref 4.8–10.8)

## 2019-09-03 RX ADMIN — DOCUSATE SODIUM SCH MG: 100 CAPSULE, LIQUID FILLED ORAL at 08:58

## 2019-09-03 RX ADMIN — HEPARIN SODIUM SCH UNITS: 5000 INJECTION INTRAVENOUS; SUBCUTANEOUS at 20:20

## 2019-09-03 RX ADMIN — DOCUSATE SODIUM SCH MG: 100 CAPSULE, LIQUID FILLED ORAL at 20:08

## 2019-09-03 RX ADMIN — HEPARIN SODIUM SCH UNITS: 5000 INJECTION INTRAVENOUS; SUBCUTANEOUS at 09:02

## 2019-09-03 NOTE — NUR
CASE MANAGEMENT:REVIEW



64 YR OLD MALE BIBJIMMY FROM [facility]



CC: UNABLE TO CARE FOR HIMSELF



SI: FAILURE TO THRIVE. GRAVELY DISABLED

97.2   72  20  142/71  94% ON RA

H/H-11.4/31.9   BUN+26   CR+1.4   LIPASE+446



IS:1L  NS BOLUS

ATIVAN IM

HALDOL IM

BENADRYL IM

**: TO MED/SURG UNIT

## 2019-09-03 NOTE — NUR
NURSE NOTES:

Received report from SAGAR Antunez. Patient in bed sleeping, in stable condition. No IV access 
discovered upon assessment. Will attempt new IV access. Patient on room air with no signs of 
distress or SOB. Bed is locked and in lowest position with bed alarm activated. Call light 
in reach. Will continue to monitor.

## 2019-09-03 NOTE — NUR
NURSE NOTES:

received patient A/A/O, asleep with nonlabored breathing. HOB elevated for aspiration 
precaution. lethargic due to cocktail administration yesterday @ ED prior transfer to the 
unit. no acute resp distress noted. siderails are up x3. bed in the lowest position. and bed 
alarm on @ all times for safety purposes. will cont to monitor.

## 2019-09-03 NOTE — NUR
Social Service Note



SW spoke with Kimberly from [facility] [phone] regarding patient's behaviors at 
facility.  Kimberly states patient had multiple episodes of incontinence in the facility  
peeing in the hallways, and defecating all over his mattress in his room quite a few times. 
Patient was placed close to a bathroom and would not attempt to use the facility.  At one 
point patient wiped his feces on the jolly.  [facility] was unable to meet patient's needs and 
was sent back to the hospital.  As of Sept 1st patient's medi-taryn was terminated due to 
failure to complete redetermination forms.  [facility] EW aware and will assist with medi-taryn 
application.  At this time patient will require SNF placement.  If incontinence issues 
improve [facility] is willing to reconsider patient back in their program.  Will continue 
to monitor.

## 2019-09-03 NOTE — HISTORY AND PHYSICAL
History of Present Illness


General


Date patient seen:  Sep 3, 2019


Reason for Hospitalization:  General Complaint





Present Illness


HPI


Patient is a very poor historian, information obtained from chart review. 64 

year old male sent from [facility], they drove him to this 

hospital stated they could not take care of him as he was smearing his feces 

all over the wall and no one could stop him. He was discharged from [facility] on 8/30/19. He was admitted for acute metabolic encephalopathy, 

obstructive uropathy with staghorn calculus, hypovolemic shock, alex on ckd, c 

diff colitis and treated. He was seen by psychiatry who started him on Seroquel 

and determined he lacks capacity to make decisions. He was seen by nephrology, 

urology, ID as well. His acute renal failure resolved and creatinine reached 

baseline.  it was decided that even though patient has significant urologic 

nephrolithiasis with involvement of the bladder and output obstruction, however

, his renal function was stable.  He was voiding independently and there was no 

urgency to any urologic intervention during previous admission. [facility] At [facility] of [facility] it was 

reported he was following orders, was redirectable, had adequate independent 

function, minimal fall risk. 





Today even though he is a poor historian he denies any abdominal pain, chest 

pain, sob, palpitations, dizziness. 








Past medical and surgical history: hypertension, prostatic hypertrophy, 

hyperlipidemia, left hip fracture status post ORIF.


Family history: Doesn't know


Social history: As above. Denies etoh use now but previous dependence.


Allergies:  


Coded Allergies:  


     No Known Allergies (Unverified , 7/23/19)





Medication History


Scheduled


Amlodipine Besylate* (Amlodipine Besylate*), 10 MG ORAL DAILY, (Reported)


Aspirin (Aspirin EC), 325 MG ORAL DAILY, (Reported)


Atenolol (Tenormin), 25 MG ORAL DAILY, (Reported)


Atorvastatin Calcium* (Atorvastatin Calcium*), Unknown Dose ORAL BEDTIME, (

Reported)


Doxazosin Mesylate (Doxazosin Mesylate), 4 MG ORAL DAILY, (Reported)


Escitalopram Oxalate* (Lexapro*), 10 MG ORAL DAILY, (Reported)


Finasteride* (Proscar*), 5 MG ORAL DAILY, (Reported)


Folic Acid* (Folic Acid*), 1 MG ORAL DAILY, (Reported)


Lisinopril (Lisinopril*), 20 MG ORAL DAILY, (Reported)


Risperidone* (Risperdal*), 2 MG ORAL BEDTIME, (Reported)


Tamsulosin HCl (Flomax), 0.4 MG ORAL DAILY, (Reported)


Tamsulosin HCl (Flomax), 0.4 MG ORAL BID, (Reported)


Thiamine Hcl* (Vitamin B-1*), 100 MG ORAL DAILY, (Reported)


Thiamine Hcl* (Vitamin B-1*), 100 MG ORAL DAILY, (Reported)





Scheduled PRN


Loperamide Hcl (Anti-Diarrheal), 2 MG PO BID PRN for Diarrhea, (Reported)





Miscellaneous Medications


Clonidine Hcl (Clonidine Hcl), 0.1 MG PO, (Reported)





Patient History


Healthcare decision maker





Resuscitation status





Advanced Directive on File








Review of Systems


ROS Narrative


unable to obtain due to mental status





Physical Exam





Last 24 Hour Vital Signs








  Date Time  Temp Pulse Resp B/P (MAP) Pulse Ox O2 Delivery O2 Flow Rate FiO2


 


9/3/19 08:40 97.1 77 17 154/90 (111) 96   


 


9/3/19 08:28      Room Air  


 


9/3/19 04:00 98.1 63 18 151/85 (107) 95   


 


9/3/19 00:00 98.6 62 18 147/94 (111) 95   


 


9/2/19 21:11      Room Air  


 


9/2/19 20:40 98.2 69 18 153/79 (103) 95   


 


9/2/19 20:35 97.2 73 16 144/72 94 Room Air  


 


9/2/19 20:00 97.2 73 16 144/72 94 Room Air  


 


9/2/19 18:20 97.2 70 16 130/70 94 Room Air  


 


9/2/19 15:24 97.2 72 16 142/71 94 Room Air  


 


9/2/19 15:24  80 16   Room Air  


 


9/2/19 15:14 97.2 72 20 142/71 (94) 94 Room Air  

















Intake and Output  


 


 9/2/19 9/3/19





 19:00 07:00


 


Intake Total 100 ml 675 ml


 


Balance 100 ml 675 ml


 


  


 


Intake IV Total 100 ml 675 ml


 


# Bowel Movements  2











Laboratory Tests








Test


  9/2/19


15:40 9/2/19


23:44 9/3/19


05:00


 


White Blood Count


  10.0 K/UL


(4.8-10.8) 


  7.7 K/UL


(4.8-10.8)


 


Red Blood Count


  3.79 M/UL


(4.70-6.10)  L 


  3.86 M/UL


(4.70-6.10)  L


 


Hemoglobin


  11.4 G/DL


(14.2-18.0)  L 


  11.3 G/DL


(14.2-18.0)  L


 


Hematocrit


  31.9 %


(42.0-52.0)  L 


  34.9 %


(42.0-52.0)  L


 


Mean Corpuscular Volume 84 FL (80-99)    90 FL (80-99)  


 


Mean Corpuscular Hemoglobin


  30.0 PG


(27.0-31.0) 


  29.2 PG


(27.0-31.0)


 


Mean Corpuscular Hemoglobin


Concent 35.6 G/DL


(32.0-36.0) 


  32.4 G/DL


(32.0-36.0)


 


Red Cell Distribution Width


  13.5 %


(11.6-14.8) 


  14.4 %


(11.6-14.8)


 


Platelet Count


  169 K/UL


(150-450) 


  184 K/UL


(150-450)


 


Mean Platelet Volume


  6.5 FL


(6.5-10.1) 


  6.1 FL


(6.5-10.1)  L


 


Neutrophils (%) (Auto)


  74.3 %


(45.0-75.0) 


  53.8 %


(45.0-75.0)


 


Lymphocytes (%) (Auto)


  12.6 %


(20.0-45.0)  L 


  29.6 %


(20.0-45.0)


 


Monocytes (%) (Auto)


  8.4 %


(1.0-10.0) 


  11.0 %


(1.0-10.0)  H


 


Eosinophils (%) (Auto)


  3.1 %


(0.0-3.0)  H 


  3.8 %


(0.0-3.0)  H


 


Basophils (%) (Auto)


  1.5 %


(0.0-2.0) 


  1.9 %


(0.0-2.0)


 


Sodium Level


  144 MMOL/L


(136-145) 


  143 MMOL/L


(136-145)


 


Potassium Level


  4.0 MMOL/L


(3.5-5.1) 


  4.3 MMOL/L


(3.5-5.1)


 


Chloride Level


  109 MMOL/L


()  H 


  109 MMOL/L


()  H


 


Carbon Dioxide Level


  23 MMOL/L


(21-32) 


  21 MMOL/L


(21-32)


 


Anion Gap


  12 mmol/L


(5-15) 


  13 mmol/L


(5-15)


 


Blood Urea Nitrogen


  26 mg/dL


(7-18)  H 


  25 mg/dL


(7-18)  H


 


Creatinine


  1.4 MG/DL


(0.55-1.30)  H 


  1.5 MG/DL


(0.55-1.30)  H


 


Estimat Glomerular Filtration


Rate 50.9 mL/min


(>60) 


  47.0 mL/min


(>60)


 


Glucose Level


  108 MG/DL


()  H 


  103 MG/DL


()


 


Calcium Level


  8.9 MG/DL


(8.5-10.1) 


  9.0 MG/DL


(8.5-10.1)


 


Total Bilirubin


  0.9 MG/DL


(0.2-1.0) 


  0.8 MG/DL


(0.2-1.0)


 


Aspartate Amino Transf


(AST/SGOT) 60 U/L (15-37)


H 


  66 U/L (15-37)


H


 


Alanine Aminotransferase


(ALT/SGPT) 78 U/L (12-78)


  


  74 U/L (12-78)


 


 


Alkaline Phosphatase


  72 U/L


() 


  71 U/L


()


 


Total Protein


  7.2 G/DL


(6.4-8.2) 


  7.2 G/DL


(6.4-8.2)


 


Albumin


  3.1 G/DL


(3.4-5.0)  L 


  3.2 G/DL


(3.4-5.0)  L


 


Globulin 4.1 g/dL    4.0 g/dL  


 


Albumin/Globulin Ratio


  0.8 (1.0-2.7)


L 


  0.8 (1.0-2.7)


L


 


Lipase


  446 U/L


()  H 


  


 


 


Urine Color  Pale yellow   


 


Urine Appearance


  


  Slightly


cloudy 


 


 


Urine pH  6 (4.5-8.0)   


 


Urine Specific Gravity


  


  1.010


(1.005-1.035) 


 


 


Urine Protein


  


  1+ (NEGATIVE)


H 


 


 


Urine Glucose (UA)


  


  Negative


(NEGATIVE) 


 


 


Urine Ketones


  


  Negative


(NEGATIVE) 


 


 


Urine Blood


  


  4+ (NEGATIVE)


H 


 


 


Urine Nitrite


  


  Positive


(NEGATIVE)  H 


 


 


Urine Bilirubin


  


  Negative


(NEGATIVE) 


 


 


Urine Urobilinogen


  


  Normal MG/DL


(0.0-1.0) 


 


 


Urine Leukocyte Esterase


  


  3+ (NEGATIVE)


H 


 


 


Urine RBC


  


  5-10 /HPF (0 -


0)  H 


 


 


Urine WBC


  


  Tntc /HPF (0 -


0)  H 


 


 


Urine Squamous Epithelial


Cells 


  Many /LPF


(NONE/OCC)  H 


 


 


Urine Bacteria


  


  Moderate /HPF


(NONE)  H 


 


 


Magnesium Level


  


  


  2.0 MG/DL


(1.8-2.4)


 


Thyroid Stimulating Hormone


(TSH) 


  


  2.208 uiU/mL


(0.358-3.740)











Microbiology








 Date/Time


Source Procedure


Growth Status


 


 


 9/2/19 16:19


Rectum  Received








Height (Feet):  5


Height (Inches):  10.00


Weight (Pounds):  160


Medications





Current Medications








 Medications


  (Trade)  Dose


 Ordered  Sig/Lul


 Route


 PRN Reason  Start Time


 Stop Time Status Last Admin


Dose Admin


 


 Acetaminophen


  (Tylenol)  650 mg  Q4H  PRN


 ORAL


 Mild Pain (Pain Scale 1-3)  9/2/19 20:30


 10/2/19 20:29   


 


 


 Al Hydroxide/Mg


 Hydroxide


  (Mylanta II)  30 ml  Q6H  PRN


 ORAL


 dyspepsia  9/2/19 20:30


 10/2/19 20:29   


 


 


 Albuterol/


 Ipratropium


  (Albuterol/


 Ipratropium)  3 ml  Q6H  PRN


 HHN


 Shortness of Breath  9/2/19 20:30


 9/7/19 20:29   


 


 


 Bisacodyl


  (Dulcolax)  10 mg  HSPRN  PRN


 RECTAL


 Constipation  9/2/19 20:30


 10/2/19 20:29   


 


 


 Dextrose


  (Dextrose 50%)  25 ml  Q30M  PRN


 IV


 Hypoglycemia  9/2/19 20:30


 10/2/19 20:29   


 


 


 Dextrose


  (Dextrose 50%)  50 ml  Q30M  PRN


 IV


 Hypoglycemia  9/2/19 20:30


 10/2/19 20:29   


 


 


 Diphenhydramine


 HCl


  (Benadryl)  25 mg  Q6H  PRN


 ORAL


 Itching/Pruritis  9/2/19 20:30


 10/2/19 20:29   


 


 


 Docusate Sodium


  (Colace)  100 mg  EVERY 12  HOURS


 ORAL


   9/2/19 21:00


 10/2/19 20:59  9/3/19 08:58


 


 


 Heparin Sodium


  (Porcine)


  (Heparin 5000


 units/ml)  5,000 units  EVERY 12  HOURS


 SUBQ


   9/2/19 21:00


 10/2/19 20:59  9/3/19 09:02


 


 


 Lorazepam


  (Ativan 2mg/ml


 1ml)  1 mg  Q4H  PRN


 IV


 For Anxiety  9/2/19 20:30


 9/9/19 20:29   


 


 


 Magnesium


 Hydroxide


  (Mom)  30 ml  HSPRN  PRN


 ORAL


 Constipation  9/2/19 20:30


 10/2/19 20:29   


 


 


 Morphine Sulfate


  (Morphine


 Sulfate)  2 mg  Q6H  PRN


 IVP


 Moderate Pain (Pain Scale 4-6)  9/2/19 20:30


 9/9/19 20:29   


 


 


 Morphine Sulfate


  (Morphine


 Sulfate)  4 mg  Q6H  PRN


 IVP


 Severe Pain (Pain Scale 7-10)  9/2/19 20:30


 9/9/19 20:29   


 


 


 Ondansetron HCl


  (Zofran)  4 mg  Q6H  PRN


 IVP


 Nausea & Vomiting  9/2/19 20:30


 10/2/19 20:29   


 


 


 Sodium Chloride  1,000 ml @ 


 75 mls/hr  [id number]


 IVLG


   9/2/19 21:22


 10/2/19 21:21  9/3/19 10:42


 


 


 Temazepam


  (Restoril)  15 mg  HSPRN  PRN


 ORAL


 Insomnia  9/2/19 20:30


 9/9/19 20:29   


 








Objective Narrative


General Appearance:  alert, dishevelled covered in feces 


Head:  normocephalic, atraumatic


Eyes:  bilateral eye PERRL, bilateral eye EOMI


ENT:  uvula midline, moist mucus membranes


Neck:  supple, no jvd 


Respiratory:  lungs clear, no respiratory distress, no retraction, no accessory 

muscle use


Cardiovascular :  regular rate, rhythm, no edema, no gallop, no murmur


Gastrointestinal:  non tender, soft, no guarding, no rebound


Rectal: Feces, diaper rash


Musculoskeletal:  moves all extremities 


Neurologic:  alert, oriented to self, difficult exam but grossly non focal


Psychiatric:  mood/affect flat. annoyed 


Skin:  no ulcers, tattoos





Assessment/Plan


Problem List:  


(1) Acute encephalopathy


ICD Codes:  G93.40 - Encephalopathy, unspecified


SNOMED:  90492867, 915747919


(2) Failure to thrive


SNOMED:  11835618


Qualifiers:  


   Qualified Codes:  R62.7 - Adult failure to thrive


(3) Staghorn calculus


ICD Codes:  N20.0 - Calculus of kidney


SNOMED:  493220550


(4) Hydronephrosis


ICD Codes:  N13.30 - Unspecified hydronephrosis


SNOMED:  16461302


(5) Encounter for generalized patient complaints


ICD Codes:  Z00.8 - Encounter for other general examination


SNOMED:  710286384


Assessment/Plan:


64 year old male admitted from facility for acute encephalopathy and FTT





1. Acute encephalopathy. Doubt metabolic, based on reviewing his labs. His 

renal function is at baseline.


-Will need psychiatry consult with Dr. Meyers 


-PT


Case management for dc planning 





2. Obstructive uropathy with staghorn calculus. Renal function stable


3. CKD stage II-III, stable 


4. Failure to thrive. 





Time of this note may not reflect time of encounter. 


I spent 70 minutes on this encounter. 50% or more on counselling and care 

coordination.


Diagnosis


Axis I:


acute encephalopathy











Zack Nichols M.D. Sep 3, 2019 11:27

## 2019-09-03 NOTE — NUR
*-* INSURANCE *-*



CLINICALS AND REVIEWS HAVE BEEN FAXED TO:



[facility]

P: [phone]

F: [phone]

## 2019-09-04 VITALS — DIASTOLIC BLOOD PRESSURE: 80 MMHG | SYSTOLIC BLOOD PRESSURE: 143 MMHG

## 2019-09-04 VITALS — SYSTOLIC BLOOD PRESSURE: 150 MMHG | DIASTOLIC BLOOD PRESSURE: 80 MMHG

## 2019-09-04 VITALS — DIASTOLIC BLOOD PRESSURE: 69 MMHG | SYSTOLIC BLOOD PRESSURE: 140 MMHG

## 2019-09-04 VITALS — DIASTOLIC BLOOD PRESSURE: 83 MMHG | SYSTOLIC BLOOD PRESSURE: 140 MMHG

## 2019-09-04 VITALS — DIASTOLIC BLOOD PRESSURE: 95 MMHG | SYSTOLIC BLOOD PRESSURE: 138 MMHG

## 2019-09-04 LAB
ANION GAP SERPL CALC-SCNC: 12 MMOL/L (ref 5–15)
BUN SERPL-MCNC: 20 MG/DL (ref 7–18)
CALCIUM SERPL-MCNC: 8.6 MG/DL (ref 8.5–10.1)
CHLORIDE SERPL-SCNC: 110 MMOL/L (ref 98–107)
CO2 SERPL-SCNC: 21 MMOL/L (ref 21–32)
CREAT SERPL-MCNC: 1.4 MG/DL (ref 0.55–1.3)
POTASSIUM SERPL-SCNC: 3.9 MMOL/L (ref 3.5–5.1)
SODIUM SERPL-SCNC: 143 MMOL/L (ref 136–145)

## 2019-09-04 RX ADMIN — TAMSULOSIN HYDROCHLORIDE SCH MG: 0.4 CAPSULE ORAL at 17:53

## 2019-09-04 RX ADMIN — DOCUSATE SODIUM SCH MG: 100 CAPSULE, LIQUID FILLED ORAL at 09:00

## 2019-09-04 RX ADMIN — HEPARIN SODIUM SCH UNITS: 5000 INJECTION INTRAVENOUS; SUBCUTANEOUS at 09:00

## 2019-09-04 RX ADMIN — DOCUSATE SODIUM SCH MG: 100 CAPSULE, LIQUID FILLED ORAL at 20:35

## 2019-09-04 RX ADMIN — HEPARIN SODIUM SCH UNITS: 5000 INJECTION INTRAVENOUS; SUBCUTANEOUS at 20:35

## 2019-09-04 NOTE — NUR
NURSE NOTES:

Pt awake, A/O x 2-3, anxious. Denies pain, no SOB noted, call light within reach, fall 
precaution maintained. will continue to monitor.

## 2019-09-04 NOTE — CONSULTATION
DATE OF CONSULTATION:  09/04/2019

HISTORY OF PRESENT ILLNESS:  The patient is a 64-year-old male with history

of multiple medical comorbidities including psychotic disorder, dementia,

hypertension, prostatic hypertrophy, hyperlipidemia, left hip fracture who

has been admitted to the hospital for hearing all over the wall.

The patient was more confused during the evaluation.  The patient is a

poor historian, confused and disoriented, unable to provide any meaningful

information, not engaged during the evaluation, poor memory.



PAST PSYCHIATRIC HISTORY:  Significant for depression and anxiety.



PAST MEDICAL HISTORY:  Significant for hypertension, prostatic hypertrophy,

hyperlipidemia, left hip fracture.



ALLERGIES:  No known drug allergies.



SUBSTANCE ABUSE HISTORY:  There is no significant substance use disorder.



MENTAL STATUS EXAMINATION:  The patient is alert, oriented times self,

place, did not know the date.  Mood is anxious.  Affect is flat.  Thought

process, there is a paucity of thought content.  Thought content, no

suicidal or homicidal ideation.  The patient is delusional.  Insight and

judgment is poor.



ASSESSMENT:

Axis I  Dementia with behavior disturbance.

Acute metabolic encephalopathy.

Axis II  Deferred.

Axis III  As above.

Axis IV  Low

Axis V  20



PLAN:

1. We will start the patient on risperidone 2 mg at bedtime.

2. Ativan as needed.

3. The patient lacks capacity to make medical decisions.

4. Provide the patient with reality orientation.

5. Discussed the case with the nurse.









  ______________________________________________

  Morris Meyers M.D.





DR:  Oscar

D:  09/04/2019 14:24

T:  09/04/2019 19:25

JOB#:  [id number]

CC:



JIHAN

## 2019-09-04 NOTE — NUR
CASE MANAGEMENT:REVIEW



9/4/19

SI:FTT. GRAVELY DISABLED

97.2   61  16  140/83  96% ON RA

BUN+20   CR+1.4



IS: IVF@75/HR

HEPARIN SQ Q12

**: MED/SURG STATUS 4 [facility]

## 2019-09-04 NOTE — NUR
P.T Note:

P.T evaluation completed. Pt is baseline independent with bed mobility and transfers and 
supervision/independent with  gait/locomotion using the FWW. Pt's current functional status 
does not warrant skilled P.T services.  DC P.T services. Thank you for this referral.

## 2019-09-04 NOTE — GENERAL PROGRESS NOTE
Assessment/Plan


Problem List:  


(1) Acute encephalopathy


ICD Codes:  G93.40 - Encephalopathy, unspecified


SNOMED:  85240837, 224988000


(2) Failure to thrive


SNOMED:  17489817


Qualifiers:  


   Qualified Codes:  R62.7 - Adult failure to thrive


(3) Staghorn calculus


ICD Codes:  N20.0 - Calculus of kidney


SNOMED:  575332453


(4) Hydronephrosis


ICD Codes:  N13.30 - Unspecified hydronephrosis


SNOMED:  69847124


(5) Encounter for generalized patient complaints


ICD Codes:  Z00.8 - Encounter for other general examination


SNOMED:  551499274


Status:  stable


Assessment/Plan:


64 year old male admitted from facility for acute encephalopathy and FTT





1. Acute encephalopathy. Doubt metabolic, based on reviewing his labs. His 

renal function is at baseline.


-Will need psychiatry consult with Dr. Meyers 


-PT


Case management for dc planning 





2. Obstructive uropathy with staghorn calculus.BPH.  Renal function stable. 

Resume Tamsulosin 


3. CKD stage II-III, stable 


4. Failure to thrive. 


5. Dementia vs acute encephalopathy. Oriented to self only. Resume outpatient 

psych meds 


6.HTN. Amlodipine


7. Etoh dependence in the past. Thiamine and folate 





Time of this note may not reflect time of encounter. 


I spent 40 minutes on this encounter. 50% or more on counselling and care 

coordination.





Subjective


Date patient seen:  Sep 4, 2019


ROS Limited/Unobtainable:  Yes


Allergies:  


Coded Allergies:  


     No Known Allergies (Unverified , 7/23/19)


Subjective


seen at bedside. 


Alert to self only


Does not answer questions appropriately 


says has no complaints.





Objective





Last 24 Hour Vital Signs








  Date Time  Temp Pulse Resp B/P (MAP) Pulse Ox O2 Delivery O2 Flow Rate FiO2


 


9/4/19 08:00 98.4 73 16 140/69 (92) 98   


 


9/4/19 04:00 97.2 61 16 140/83 (102) 96   


 


9/4/19 00:00 97.5 57 16 143/80 (101) 96   


 


9/3/19 21:00      Room Air  


 


9/3/19 20:00 98.8 69 16 152/80 (104) 97   


 


9/3/19 16:00 97.8 68 16 142/84 (103) 94   


 


9/3/19 12:00 98.7 74 17 130/73 (92) 98   

















Intake and Output  


 


 9/3/19 9/4/19





 19:00 07:00


 


Intake Total 900 ml 75 ml


 


Balance 900 ml 75 ml


 


  


 


Intake IV Total 900 ml 75 ml








Laboratory Tests


9/4/19 06:57: 


Sodium Level 143, Potassium Level 3.9, Chloride Level 110H, Carbon Dioxide 

Level 21, Anion Gap 12, Blood Urea Nitrogen 20H, Creatinine 1.4H, Estimat 

Glomerular Filtration Rate 50.9, Glucose Level 92, Calcium Level 8.6


Height (Feet):  5


Height (Inches):  10.00


Weight (Pounds):  176


Objective


General Appearance:  alert, sitting in bed ,calm 


Head:  normocephalic, atraumatic


Eyes:  bilateral eye PERRL, bilateral eye EOMI


ENT:  uvula midline, moist mucus membranes


Neck:  supple, no jvd 


Respiratory:  lungs clear, no respiratory distress, no retraction, no accessory 

muscle use


Cardiovascular :  regular rate, rhythm, no edema, no gallop, no murmur


Gastrointestinal:  non tender, soft, no guarding, no rebound


Musculoskeletal:  moves all extremities 


Neurologic:  alert, oriented to self only


Psychiatric:  mood/affect flat. annoyed 


Skin:  no ulcers, tattoos











Zack Nichols M.D. Sep 4, 2019 10:37

## 2019-09-05 VITALS — DIASTOLIC BLOOD PRESSURE: 93 MMHG | SYSTOLIC BLOOD PRESSURE: 160 MMHG

## 2019-09-05 VITALS — DIASTOLIC BLOOD PRESSURE: 89 MMHG | SYSTOLIC BLOOD PRESSURE: 149 MMHG

## 2019-09-05 VITALS — SYSTOLIC BLOOD PRESSURE: 152 MMHG | DIASTOLIC BLOOD PRESSURE: 78 MMHG

## 2019-09-05 VITALS — SYSTOLIC BLOOD PRESSURE: 143 MMHG | DIASTOLIC BLOOD PRESSURE: 76 MMHG

## 2019-09-05 VITALS — SYSTOLIC BLOOD PRESSURE: 166 MMHG | DIASTOLIC BLOOD PRESSURE: 85 MMHG

## 2019-09-05 VITALS — SYSTOLIC BLOOD PRESSURE: 124 MMHG | DIASTOLIC BLOOD PRESSURE: 79 MMHG

## 2019-09-05 VITALS — SYSTOLIC BLOOD PRESSURE: 154 MMHG | DIASTOLIC BLOOD PRESSURE: 74 MMHG

## 2019-09-05 RX ADMIN — TAMSULOSIN HYDROCHLORIDE SCH MG: 0.4 CAPSULE ORAL at 17:12

## 2019-09-05 RX ADMIN — HEPARIN SODIUM SCH UNITS: 5000 INJECTION INTRAVENOUS; SUBCUTANEOUS at 20:46

## 2019-09-05 RX ADMIN — TAMSULOSIN HYDROCHLORIDE SCH MG: 0.4 CAPSULE ORAL at 08:53

## 2019-09-05 RX ADMIN — HEPARIN SODIUM SCH UNITS: 5000 INJECTION INTRAVENOUS; SUBCUTANEOUS at 08:56

## 2019-09-05 RX ADMIN — LORAZEPAM PRN MG: 1 TABLET ORAL at 12:46

## 2019-09-05 RX ADMIN — DOCUSATE SODIUM SCH MG: 100 CAPSULE, LIQUID FILLED ORAL at 08:54

## 2019-09-05 RX ADMIN — DOCUSATE SODIUM SCH MG: 100 CAPSULE, LIQUID FILLED ORAL at 20:45

## 2019-09-05 RX ADMIN — LORAZEPAM PRN MG: 1 TABLET ORAL at 04:30

## 2019-09-05 RX ADMIN — Medication SCH MG: at 08:53

## 2019-09-05 NOTE — GENERAL PROGRESS NOTE
Assessment/Plan


Problem List:  


(1) Acute encephalopathy


ICD Codes:  G93.40 - Encephalopathy, unspecified


SNOMED:  14186193, 115201917


(2) Failure to thrive


SNOMED:  21864081


Qualifiers:  


   Qualified Codes:  R62.7 - Adult failure to thrive


(3) Staghorn calculus


ICD Codes:  N20.0 - Calculus of kidney


SNOMED:  703619968


(4) Hydronephrosis


ICD Codes:  N13.30 - Unspecified hydronephrosis


SNOMED:  65492502


(5) Encounter for generalized patient complaints


ICD Codes:  Z00.8 - Encounter for other general examination


SNOMED:  493945267


(6) Dementia with behavioral disturbance


ICD Codes:  F03.91 - Unspecified dementia with behavioral disturbance


SNOMED:  7686229351961


Status:  stable


Assessment/Plan:


64 year old male admitted from facility for acute encephalopathy, Failure to 

thrive and dementia with behavioral disturbance





1. Acute encephalopathy/ dementia with behavioral disturbance. Doubt metabolic, 

based on reviewing his labs. His renal function is at baseline.


-Will need psychiatry consult with Dr. Meyers 


-PT, patient seen ambulating independently with walker 


Case management for dc planning 





2. Obstructive uropathy with staghorn calculus.BPH.  Renal function stable. 

Resume Tamsulosin 


3. CKD stage II-III, stable 


4. Failure to thrive. 


5. Dementia with behavioral disturbance. Oriented to self only. Resume 

outpatient psych meds. Risperidone 2mg at night per psychiatry and ativan prn 


6.HTN. Amlodipine


7. Etoh dependence in the past. Thiamine and folate 





Time of this note may not reflect time of encounter. 


I spent 40 minutes on this encounter. 50% or more on counselling and care 

coordination.





Subjective


Date patient seen:  Sep 5, 2019


ROS Limited/Unobtainable:  Yes


Allergies:  


Coded Allergies:  


     No Known Allergies (Unverified , 7/23/19)


Subjective


walking around [place] with walker, urinating everywhere


alert


oriented to self


wants to go outside for a smoke





Objective





Last 24 Hour Vital Signs








  Date Time  Temp Pulse Resp B/P (MAP) Pulse Ox O2 Delivery O2 Flow Rate FiO2


 


9/5/19 09:00      Room Air  


 


9/5/19 08:54  59  148/72    


 


9/5/19 08:00 96.3 55 18 160/93 (115) 96   


 


9/5/19 07:45  65 18  96 Room Air  21


 


9/5/19 04:00 98.9 68 20 124/79 (94) 96   


 


9/5/19 00:06 97.8  18 149/89 (109) 98   


 


9/4/19 20:08      Room Air  


 


9/4/19 19:38  78 18  94 Room Air  21


 


9/4/19 16:00 98.4 71 18 138/95 (109) 98   


 


9/4/19 12:00 97.8 65 18 150/80 (103) 98   

















Intake and Output  


 


 9/4/19 9/5/19





 18:59 06:59


 


Intake Total 600 ml 


 


Balance 600 ml 


 


  


 


Intake Oral 600 ml 


 


# Voids 4 


 


# Bowel Movements 3 








Height (Feet):  5


Height (Inches):  10.00


Weight (Pounds):  176


Objective


General Appearance:  alert, no distress 


Head:  normocephalic, atraumatic


Eyes:  bilateral eye PERRL, bilateral eye EOMI


ENT:  uvula midline, moist mucus membranes


Neck:  supple, no jvd 


Respiratory:  lungs clear, no respiratory distress, no retraction, no accessory 

muscle use


Cardiovascular :  regular rate, rhythm, no edema, no gallop, no murmur


Gastrointestinal:  non tender, soft, no guarding, no rebound


Musculoskeletal:  moves all extremities 


Neurologic:  alert, oriented to self only


Psychiatric:  mood/affect flat. annoyed 


Skin:  no ulcers, tattoos











Zack Nichols M.D. Sep 5, 2019 11:23

## 2019-09-05 NOTE — NUR
RD ASSESSMENT & RECOMMENDATIONS

SEE CARE ACTIVITY FOR COMPLETE ASSESSMENT



DAILY ESTIMATED NEEDS:

Needs based on Cardiac 80kg adj  

25-30  kcals/kg 

4810-8416  total kcals

1-1.2  g protein/kg

80-96  g total protein 

25-30  mL/kg

1836-4838  total fluid mLs



NUTRITION DIAGNOSIS:

Altered nutrition related lab values r/t clinical status as evidenced by

elev Creat(1.4), elev AST, elev lipase (446), elev BP (148/72).



CURRENT DIET:Regular     





PO DIET RECOMMENDATIONS:

LOW NA diet / soft easy chew  





ADDITIONAL RECOMMENDATIONS:

1) Obtain a standing weight as able / or calibrated bed scale wt 

2) Monitor lytes daily, replete as needed  

3) Snacks BID in b/w meals  

4) H/o C-diff, monitor hydration status 

     Rec Probiotics

## 2019-09-05 NOTE — NUR
nurse notes

received patient sleeping , easily arousable, no sign of distress, HL right FA, refused IVF 
per NOC shift , on fall and aspiration precaution observed ,both siderails up for safety, 
bed in low position,  plan of care was discussed verbalized understanding, 4P's in progress 
call light w/n reach, will continue to monitor patient condition

 reji barnes

## 2019-09-05 NOTE — NUR
CASE MANAGEMENT:REVIEW



9/5/19

SI:FTT. GRAVELY DISABLED

97.5   54  20  154/74  96% ON RA



IS: NORVASC PO QD

LEXAPRO PO QD

RISPERDAL PO QHS

FLOMAX PO BID

ATIVAN PO Q6HRS PRN

IVF@75/HR

HEPARIN SQ Q12

**: MED/SURG STATUS 4 EAST



PLAN:

PATIENT'S INSURANCE HAS TERMED AND HE DOESN'T HAVE ANY INCOME

APPLYING FOR MCAL TO BE RE-INSTATED

## 2019-09-05 NOTE — NUR
NURSE NOTES:

Patient reports he fell. Patient found sitting at side of bed. Patient reports he hit right 
side of head and right shoulder on bed. Patient is alert and awake. Vital signs stable. No 
bruising or laceration noted. Dr. Garrett's office called. Voicemail left and awaiting call 
back.

## 2019-09-05 NOTE — NUR
HAND-OFF: 

Report given to SALONI Hoffman accordigly

patient resting comfortably in bed, denies any pain or discomfort



saloni barnes.

## 2019-09-05 NOTE — PROGRESS NOTE
DATE:  09/05/2019

SUBJECTIVE:  The patient was asleep, arousable with verbal stimuli.  The

patient was more alert today, stated that he hit his head to the side of

the bed.  He was able to answer the questions.  He is more lucid,

compliant with medication.  Sleep was adequate.



MENTAL STATUS EXAMINATION:  The patient is alert and oriented to self and

place.  Mood is neutral.  Affect is constricted.  Thought processes is

linear.  Thought content, no suicidal or homicidal ideation.



ASSESSMENT:

1. Acute encephalopathy, improving.

2. Dementia.



PLAN:

1. We will continue the current medication.

2. Provide the patient with reality orientation.









  ______________________________________________

  Morris Meyers M.D.





DR:  Oscar

D:  09/05/2019 18:29

T:  09/05/2019 22:07

JOB#:  [id number]

CC:

## 2019-09-05 NOTE — NUR
SPEECH PATHOLOGY:

PATIENT AT BASELINE RELATIVE TO COGNITIVE/LINGUISTIC SKILLS.



ON THE SLUMS (PITO [employer]ERS[employer] MENTAL SCALE) PATIENT ACHIEVED A RAW SCORE OF 8 WHICH IS 
INDICATIVE OF A MODERATE/SEV COGNITIVE DISORDER.



PATIENTS COGNITIVE DEFICITS INDICATE THE NEED FOR A FULLY SUPPORTED LIVING 

ENVIRONMENT FOR HIS HEALTH/SAFETY



NO FURTHER SKILLED ST APPEARS TO BE NEEDED AT THIS TIME.



THANK YOU FOR THIS REFERRAL.

## 2019-09-06 VITALS — SYSTOLIC BLOOD PRESSURE: 158 MMHG | DIASTOLIC BLOOD PRESSURE: 69 MMHG

## 2019-09-06 VITALS — SYSTOLIC BLOOD PRESSURE: 110 MMHG | DIASTOLIC BLOOD PRESSURE: 55 MMHG

## 2019-09-06 VITALS — SYSTOLIC BLOOD PRESSURE: 149 MMHG | DIASTOLIC BLOOD PRESSURE: 90 MMHG

## 2019-09-06 VITALS — SYSTOLIC BLOOD PRESSURE: 146 MMHG | DIASTOLIC BLOOD PRESSURE: 83 MMHG

## 2019-09-06 VITALS — DIASTOLIC BLOOD PRESSURE: 88 MMHG | SYSTOLIC BLOOD PRESSURE: 152 MMHG

## 2019-09-06 RX ADMIN — TAMSULOSIN HYDROCHLORIDE SCH MG: 0.4 CAPSULE ORAL at 10:05

## 2019-09-06 RX ADMIN — DOCUSATE SODIUM SCH MG: 100 CAPSULE, LIQUID FILLED ORAL at 10:04

## 2019-09-06 RX ADMIN — Medication SCH MG: at 10:21

## 2019-09-06 RX ADMIN — DOCUSATE SODIUM SCH MG: 100 CAPSULE, LIQUID FILLED ORAL at 20:05

## 2019-09-06 RX ADMIN — HEPARIN SODIUM SCH UNITS: 5000 INJECTION INTRAVENOUS; SUBCUTANEOUS at 20:07

## 2019-09-06 RX ADMIN — HEPARIN SODIUM SCH UNITS: 5000 INJECTION INTRAVENOUS; SUBCUTANEOUS at 10:10

## 2019-09-06 RX ADMIN — TAMSULOSIN HYDROCHLORIDE SCH MG: 0.4 CAPSULE ORAL at 18:22

## 2019-09-06 RX ADMIN — LORAZEPAM PRN MG: 1 TABLET ORAL at 05:29

## 2019-09-06 NOTE — NUR
CASE MANAGEMENT:REVIEW



9/6/19

SI:FTT. GRAVELY DISABLED

97.3   51  16  146/83  98% ON RA



IS: NORVASC PO QD

LEXAPRO PO QD

RISPERDAL PO QHS

FLOMAX PO BID

ATIVAN PO Q6HRS PRN

IVF@75/HR

HEPARIN SQ Q12

**: MED/SURG STATUS 4 EAST



PLAN:

PATIENT'S INSURANCE HAS TERMED AND HE DOESN'T HAVE ANY INCOME

APPLYING FOR MCAL TO BE RE-INSTATED

## 2019-09-06 NOTE — PROGRESS NOTE
DATE:  09/06/2019

SUBJECTIVE:  The patient is doing well.  He is ambulating now.  The patient

is more alert and is able to answer the questions more appropriately.  The

patient is cooperative, manageable.



MENTAL STATUS EXAMINATION:  The patient is alert, oriented times self and

place.  Mood is neutral to anxious.  Affect is constricted, congruent with

mood.  Thought process is linear and goal oriented.  Thought content, no

suicidal or homicidal ideation.  Memory is impaired.



ASSESSMENT:  Stable.



PLAN:  We will continue current medications.









  ______________________________________________

  Morris Meyers M.D.





DR:  COURTNEY

D:  09/06/2019 19:08

T:  09/06/2019 21:04

JOB#:  [id number]

CC:

## 2019-09-06 NOTE — NUR
NURSE NOTES:

 Patient  resting ,patient has been cooperative .patient had 2 soft brown BM today,skin care 
given.bed alarm on call light within reach.

## 2019-09-06 NOTE — GENERAL PROGRESS NOTE
Assessment/Plan


Problem List:  


(1) Acute encephalopathy


ICD Codes:  G93.40 - Encephalopathy, unspecified


SNOMED:  77502406, 809643293


(2) Failure to thrive


SNOMED:  92084067


Qualifiers:  


   Qualified Codes:  R62.7 - Adult failure to thrive


(3) Staghorn calculus


ICD Codes:  N20.0 - Calculus of kidney


SNOMED:  346081481


(4) Hydronephrosis


ICD Codes:  N13.30 - Unspecified hydronephrosis


SNOMED:  25129714


(5) Encounter for generalized patient complaints


ICD Codes:  Z00.8 - Encounter for other general examination


SNOMED:  215648012


(6) Dementia with behavioral disturbance


ICD Codes:  F03.91 - Unspecified dementia with behavioral disturbance


SNOMED:  7114019674945


Status:  stable


Assessment/Plan:


64 year old male admitted from facility for acute encephalopathy, Failure to 

thrive and dementia with behavioral disturbance





1. Acute encephalopathy/ dementia with behavioral disturbance. Doubt metabolic, 

based on reviewing his labs. His renal function is at baseline.


-Will need psychiatry consult with Dr. Meyers 


-PT, patient seen ambulating independently with walker 


Case management for dc planning 





2. Obstructive uropathy with staghorn calculus.BPH.  Renal function stable. 

Resume Tamsulosin 


3. CKD stage II-III, stable 


4. Failure to thrive. 


5. Dementia with behavioral disturbance. Oriented to self only. Resume 

outpatient psych meds. Risperidone 2mg at night per psychiatry and ativan prn 


6.HTN. Amlodipine


7. Etoh dependence in the past. Thiamine and folate 





has no insurance,  involved. applied for medical 





Time of this note may not reflect time of encounter. 


I spent 40 minutes on this encounter. 50% or more on counselling and care 

coordination.





Subjective


Date patient seen:  Sep 6, 2019


ROS Limited/Unobtainable:  Yes


Allergies:  


Coded Allergies:  


     No Known Allergies (Unverified , 7/23/19)


Subjective


seen and examined at bedside


found him sleeping


calm





Objective





Last 24 Hour Vital Signs








  Date Time  Temp Pulse Resp B/P (MAP) Pulse Ox O2 Delivery O2 Flow Rate FiO2


 


9/6/19 12:43 97.7 55 16 152/88 (109) 97   


 


9/6/19 10:08  59  163/87    


 


9/6/19 09:00      Room Air  


 


9/6/19 08:59 97.2 52 16 158/69 (98) 96   


 


9/6/19 07:40  58 17  96 Room Air  21


 


9/6/19 04:00 97.3 51 16 146/83 (104) 98   


 


9/5/19 20:02      Room Air  


 


9/5/19 20:00 97.5 55 18 143/76 (98) 98   


 


9/5/19 19:53  68 18  97 Room Air  21


 


9/5/19 17:00    152/78 (102)    


 


9/5/19 16:00 97.8 68 23 166/85 (112) 96   

















Intake and Output  


 


 9/5/19 9/6/19





 18:59 06:59


 


Intake Total 1100 ml 236 ml


 


Balance 1100 ml 236 ml


 


  


 


Intake Oral 1100 ml 236 ml


 


# Voids 4 2


 


# Bowel Movements 4 








Height (Feet):  5


Height (Inches):  10.00


Weight (Pounds):  176


Objective


General Appearance:  alert, no distress 


Head:  normocephalic, atraumatic


Eyes:  bilateral eye PERRL, bilateral eye EOMI


ENT:  uvula midline, moist mucus membranes


Neck:  supple, no jvd 


Respiratory:  lungs clear, no respiratory distress, no retraction, no accessory 

muscle use


Cardiovascular :  regular rate, rhythm, no edema, no gallop, no murmur


Gastrointestinal:  non tender, soft, no guarding, no rebound


Musculoskeletal:  moves all extremities 


Neurologic:  alert, oriented to self only


Psychiatric:  mood/affect flat. annoyed 


Skin:  no ulcers, tattoos











Zack Nichols M.D. Sep 6, 2019 15:19

## 2019-09-06 NOTE — NUR
NURSE NOTES:

Patient eyes closed,alert when name called,respirations unlabored,call light within 
reach,bed height in lowest position.No stool noted at this time,bed is clean,will monitor.

## 2019-09-06 NOTE — NUR
NURSE NOTES:

Pt received in bed asleep, no c/o pain or signs of distress, able to make needs known, will 
continue to monitor.

## 2019-09-07 VITALS — SYSTOLIC BLOOD PRESSURE: 146 MMHG | DIASTOLIC BLOOD PRESSURE: 83 MMHG

## 2019-09-07 VITALS — SYSTOLIC BLOOD PRESSURE: 120 MMHG | DIASTOLIC BLOOD PRESSURE: 85 MMHG

## 2019-09-07 VITALS — SYSTOLIC BLOOD PRESSURE: 152 MMHG | DIASTOLIC BLOOD PRESSURE: 81 MMHG

## 2019-09-07 VITALS — SYSTOLIC BLOOD PRESSURE: 125 MMHG | DIASTOLIC BLOOD PRESSURE: 68 MMHG

## 2019-09-07 VITALS — DIASTOLIC BLOOD PRESSURE: 84 MMHG | SYSTOLIC BLOOD PRESSURE: 155 MMHG

## 2019-09-07 VITALS — SYSTOLIC BLOOD PRESSURE: 16 MMHG | DIASTOLIC BLOOD PRESSURE: 90 MMHG

## 2019-09-07 RX ADMIN — DOCUSATE SODIUM SCH MG: 100 CAPSULE, LIQUID FILLED ORAL at 20:17

## 2019-09-07 RX ADMIN — TAMSULOSIN HYDROCHLORIDE SCH MG: 0.4 CAPSULE ORAL at 18:11

## 2019-09-07 RX ADMIN — Medication SCH MG: at 08:33

## 2019-09-07 RX ADMIN — HEPARIN SODIUM SCH UNITS: 5000 INJECTION INTRAVENOUS; SUBCUTANEOUS at 20:18

## 2019-09-07 RX ADMIN — DOCUSATE SODIUM SCH MG: 100 CAPSULE, LIQUID FILLED ORAL at 09:00

## 2019-09-07 RX ADMIN — HEPARIN SODIUM SCH UNITS: 5000 INJECTION INTRAVENOUS; SUBCUTANEOUS at 08:31

## 2019-09-07 RX ADMIN — TAMSULOSIN HYDROCHLORIDE SCH MG: 0.4 CAPSULE ORAL at 08:27

## 2019-09-07 NOTE — NUR
NURSE NOTES:

Patient is alert and oriented,patient had a large soft brown  bowel movement,skin care 
given,patient ate break fast.bed  alarm  is on,callight within reach.

## 2019-09-07 NOTE — NUR
NURSE NOTES:

Pt received in bed, head of bed elevated, no c/o pain or signs of distress, only asking for 
something to eat, able to make needs known, will continue to monitor.

## 2019-09-07 NOTE — GENERAL PROGRESS NOTE
Assessment/Plan


Problem List:  


(1) Acute encephalopathy


ICD Codes:  G93.40 - Encephalopathy, unspecified


SNOMED:  52371911, 549553894


(2) Failure to thrive


SNOMED:  49781661


Qualifiers:  


   Qualified Codes:  R62.7 - Adult failure to thrive


(3) Staghorn calculus


ICD Codes:  N20.0 - Calculus of kidney


SNOMED:  676870997


(4) Hydronephrosis


ICD Codes:  N13.30 - Unspecified hydronephrosis


SNOMED:  66511179


(5) Encounter for generalized patient complaints


ICD Codes:  Z00.8 - Encounter for other general examination


SNOMED:  492743902


(6) Dementia with behavioral disturbance


ICD Codes:  F03.91 - Unspecified dementia with behavioral disturbance


SNOMED:  5906458079542


Status:  stable


Assessment/Plan:


64 year old male admitted from facility for acute encephalopathy, Failure to 

thrive and dementia with behavioral disturbance





1. Acute encephalopathy/ dementia with behavioral disturbance. Doubt metabolic, 

based on reviewing his labs. His renal function is at baseline.


-Will need psychiatry consult with Dr. Meyers 


-PT, patient seen ambulating independently with walker 


Case management for dc planning 





2. Obstructive uropathy with staghorn calculus.BPH.  Renal function stable. 

Resume Tamsulosin 


3. CKD stage II-III, stable 


4. Failure to thrive. 


5. Dementia with behavioral disturbance. Oriented to self only. Resume 

outpatient psych meds. Risperidone 2mg at night per psychiatry and ativan prn 


6.HTN. Amlodipine


7. Etoh dependence in the past. Thiamine and folate 





has no insurance,  involved. applied for medical 





Time of this note may not reflect time of encounter. 


I spent 40 minutes on this encounter. 50% or more on counselling and care 

coordination.





Subjective


Date patient seen:  Sep 7, 2019


ROS Limited/Unobtainable:  Yes


Allergies:  


Coded Allergies:  


     No Known Allergies (Unverified , 7/23/19)


Subjective


seen and examined sitting in chair outside room


calm





Objective





Last 24 Hour Vital Signs








  Date Time  Temp Pulse Resp B/P (MAP) Pulse Ox O2 Delivery O2 Flow Rate FiO2


 


9/7/19 12:00 97.0 73 16 146/83 (104) 97   


 


9/7/19 09:00      Room Air  


 


9/7/19 08:37 97.4 77 18 120/85 (97) 98   


 


9/7/19 08:27  77  120/85    


 


9/7/19 04:00 97.3 54 20 152/81 (104) 96   


 


9/7/19 00:00 98.0 60 19 125/68 (87) 96   


 


9/6/19 21:00      Room Air  


 


9/6/19 20:00 97.3 62 19 110/55 (73) 99   


 


9/6/19 19:36  60 18  95 Room Air  21


 


9/6/19 16:40 97.7 59 18 149/90 (109) 97   

















Intake and Output  


 


 9/6/19 9/7/19





 18:59 06:59


 


Intake Total 720 ml 360 ml


 


Output Total 3 ml 


 


Balance 717 ml 360 ml


 


  


 


Intake Oral 720 ml 360 ml


 


Output Urine Total 3 ml 


 


# Voids  3


 


# Bowel Movements 2 2








Height (Feet):  5


Height (Inches):  10.00


Weight (Pounds):  176


Objective


General Appearance:  alert, no distress 


Head:  normocephalic, atraumatic


Eyes:  bilateral eye PERRL, bilateral eye EOMI


ENT:  uvula midline, moist mucus membranes


Neck:  supple, no jvd 


Respiratory:  lungs clear, no respiratory distress, no retraction, no accessory 

muscle use


Cardiovascular :  regular rate, rhythm, no edema, no gallop, no murmur


Gastrointestinal:  non tender, soft, no guarding, no rebound


Musculoskeletal:  moves all extremities 


Neurologic:  alert, oriented to self only


Psychiatric:  mood/affect flat. annoyed 


Skin:  no ulcers, tattoos











Zack Nichols M.D. Sep 7, 2019 13:32

## 2019-09-08 VITALS — DIASTOLIC BLOOD PRESSURE: 91 MMHG | SYSTOLIC BLOOD PRESSURE: 155 MMHG

## 2019-09-08 VITALS — SYSTOLIC BLOOD PRESSURE: 152 MMHG | DIASTOLIC BLOOD PRESSURE: 84 MMHG

## 2019-09-08 VITALS — SYSTOLIC BLOOD PRESSURE: 155 MMHG | DIASTOLIC BLOOD PRESSURE: 81 MMHG

## 2019-09-08 VITALS — SYSTOLIC BLOOD PRESSURE: 148 MMHG | DIASTOLIC BLOOD PRESSURE: 83 MMHG

## 2019-09-08 VITALS — SYSTOLIC BLOOD PRESSURE: 151 MMHG | DIASTOLIC BLOOD PRESSURE: 80 MMHG

## 2019-09-08 RX ADMIN — HEPARIN SODIUM SCH UNITS: 5000 INJECTION INTRAVENOUS; SUBCUTANEOUS at 20:18

## 2019-09-08 RX ADMIN — HEPARIN SODIUM SCH UNITS: 5000 INJECTION INTRAVENOUS; SUBCUTANEOUS at 08:18

## 2019-09-08 RX ADMIN — TAMSULOSIN HYDROCHLORIDE SCH MG: 0.4 CAPSULE ORAL at 17:59

## 2019-09-08 RX ADMIN — DOCUSATE SODIUM SCH MG: 100 CAPSULE, LIQUID FILLED ORAL at 20:11

## 2019-09-08 RX ADMIN — DOCUSATE SODIUM SCH MG: 100 CAPSULE, LIQUID FILLED ORAL at 08:19

## 2019-09-08 RX ADMIN — Medication SCH MG: at 08:16

## 2019-09-08 RX ADMIN — TAMSULOSIN HYDROCHLORIDE SCH MG: 0.4 CAPSULE ORAL at 08:15

## 2019-09-08 NOTE — NUR
NURSE NOTES:

Pt received in bed awake, asking for food, able to make needs known, bed in lowest position, 
will continue to monitor.

## 2019-09-08 NOTE — NUR
NURSE NOTES:



Patient awake and alert,respirations unlabored.patient had a small Bm skin care 
given.,patient ate breakfast.Bed alarm is on,call light within reach.

## 2019-09-08 NOTE — NUR
NURSE NOTES

 Patient resting,sitting up in chair, patient has been incontinent of urine and 
stool,skincare give through out the day.

## 2019-09-08 NOTE — GENERAL PROGRESS NOTE
Assessment/Plan


Problem List:  


(1) Acute encephalopathy


ICD Codes:  G93.40 - Encephalopathy, unspecified


SNOMED:  90550235, 476786044


(2) Failure to thrive


SNOMED:  34372953


Qualifiers:  


   Qualified Codes:  R62.7 - Adult failure to thrive


(3) Staghorn calculus


ICD Codes:  N20.0 - Calculus of kidney


SNOMED:  899675744


(4) Hydronephrosis


ICD Codes:  N13.30 - Unspecified hydronephrosis


SNOMED:  91628916


(5) Encounter for generalized patient complaints


ICD Codes:  Z00.8 - Encounter for other general examination


SNOMED:  164472304


(6) Dementia with behavioral disturbance


ICD Codes:  F03.91 - Unspecified dementia with behavioral disturbance


SNOMED:  8857477901140


Status:  stable


Assessment/Plan:


64 year old male admitted from facility for acute encephalopathy, Failure to 

thrive and dementia with behavioral disturbance





1. Acute encephalopathy/ dementia with behavioral disturbance. Doubt metabolic, 

based on reviewing his labs. His renal function is at baseline.


-Will need psychiatry consult with Dr. Meyers 


-PT, patient seen ambulating independently with walker 


Case management for dc planning 





2. Obstructive uropathy with staghorn calculus.BPH.  Renal function stable. 

Resume Tamsulosin 


3. CKD stage II-III, stable 


4. Failure to thrive. 


5. Dementia with behavioral disturbance. Oriented to self only. Resume 

outpatient psych meds. Risperidone 2mg at night per psychiatry and ativan prn 


6.HTN. Amlodipine


7. Etoh dependence in the past. Thiamine and folate 





has no insurance,  involved. applied for medical 





Time of this note may not reflect time of encounter. 


I spent 40 minutes on this encounter. 50% or more on counselling and care 

coordination.





Subjective


Allergies:  


Coded Allergies:  


     No Known Allergies (Unverified , 7/23/19)


Subjective


seen and examined sitting in chair outside room


calm





Objective





Last 24 Hour Vital Signs








  Date Time  Temp Pulse Resp B/P (MAP) Pulse Ox O2 Delivery O2 Flow Rate FiO2


 


9/8/19 10:28      Room Air  


 


9/8/19 08:16  63  151/80    


 


9/8/19 08:01 96.6 63 18 151/80 (103) 98   


 


9/8/19 00:00 98.1 56 18 148/83 (104) 96   


 


9/7/19 21:15  59 18  96 Room Air  21


 


9/7/19 21:00      Room Air  


 


9/7/19 20:00 98.1 64 18 155/84 (107) 96   


 


9/7/19 16:00 97.8 76  16/90 (66) 99   


 


9/7/19 12:00 97.0 73 16 146/83 (104) 97   

















Intake and Output  


 


 9/7/19 9/8/19





 19:00 07:00


 


Intake Total 360 ml 1080 ml


 


Balance 360 ml 1080 ml


 


  


 


Intake Oral 360 ml 480 ml


 


IV Total  600 ml


 


# Voids 2 3








Height (Feet):  5


Height (Inches):  10.00


Weight (Pounds):  176


Objective


General Appearance:  alert, no distress 


Head:  normocephalic, atraumatic


Eyes:  bilateral eye PERRL, bilateral eye EOMI


ENT:  uvula midline, moist mucus membranes


Neck:  supple, no jvd 


Respiratory:  lungs clear, no respiratory distress, no retraction, no accessory 

muscle use


Cardiovascular :  regular rate, rhythm, no edema, no gallop, no murmur


Gastrointestinal:  non tender, soft, no guarding, no rebound


Musculoskeletal:  moves all extremities 


Neurologic:  alert, oriented to self only


Psychiatric:  mood/affect flat. annoyed 


Skin:  no ulcers, tattoos











Zack Nichols M.D. Sep 8, 2019 11:09

## 2019-09-09 VITALS — DIASTOLIC BLOOD PRESSURE: 81 MMHG | SYSTOLIC BLOOD PRESSURE: 135 MMHG

## 2019-09-09 VITALS — SYSTOLIC BLOOD PRESSURE: 145 MMHG | DIASTOLIC BLOOD PRESSURE: 79 MMHG

## 2019-09-09 VITALS — SYSTOLIC BLOOD PRESSURE: 135 MMHG | DIASTOLIC BLOOD PRESSURE: 81 MMHG

## 2019-09-09 VITALS — SYSTOLIC BLOOD PRESSURE: 132 MMHG | DIASTOLIC BLOOD PRESSURE: 83 MMHG

## 2019-09-09 VITALS — SYSTOLIC BLOOD PRESSURE: 129 MMHG | DIASTOLIC BLOOD PRESSURE: 70 MMHG

## 2019-09-09 RX ADMIN — HEPARIN SODIUM SCH UNITS: 5000 INJECTION INTRAVENOUS; SUBCUTANEOUS at 09:05

## 2019-09-09 RX ADMIN — DOCUSATE SODIUM SCH MG: 100 CAPSULE, LIQUID FILLED ORAL at 20:35

## 2019-09-09 RX ADMIN — TAMSULOSIN HYDROCHLORIDE SCH MG: 0.4 CAPSULE ORAL at 18:13

## 2019-09-09 RX ADMIN — DOCUSATE SODIUM SCH MG: 100 CAPSULE, LIQUID FILLED ORAL at 09:04

## 2019-09-09 RX ADMIN — LORAZEPAM PRN MG: 1 TABLET ORAL at 19:13

## 2019-09-09 RX ADMIN — HEPARIN SODIUM SCH UNITS: 5000 INJECTION INTRAVENOUS; SUBCUTANEOUS at 20:33

## 2019-09-09 RX ADMIN — TAMSULOSIN HYDROCHLORIDE SCH MG: 0.4 CAPSULE ORAL at 09:04

## 2019-09-09 RX ADMIN — Medication SCH MG: at 09:04

## 2019-09-09 NOTE — NUR
CASE MANAGEMENT:REVIEW



9/9/19

SI:FTT. GRAVELY DISABLED

97.8   70  19  132/83  98% ON RA



IS: NORVASC PO QD

LEXAPRO PO QD

RISPERDAL PO QHS

FLOMAX PO BID

ATIVAN PO Q6HRS PRN

IVF@75/HR

HEPARIN SQ Q12

**: MED/SURG STATUS 4 EAST



PLAN:

PATIENT'S INSURANCE HAS TERMED AND HE DOESN'T HAVE ANY INCOME

APPLYING FOR MCAL TO BE RE-INSTATED

## 2019-09-09 NOTE — NUR
NURSE NOTES:



HANDOFF RECEIVED FROM SALONI HENDERSON. PATIENT SITTING IN BED EATING BREAKFAST. IV SITE IS 
SALINE LOCKED, DRY AND INTACT. PATIENT ABLE TO MAKE NEEDS KNOWN. WILL CONTINUE TO MONITOR.

## 2019-09-09 NOTE — GENERAL PROGRESS NOTE
Assessment/Plan


Problem List:  


(1) Acute encephalopathy


ICD Codes:  G93.40 - Encephalopathy, unspecified


SNOMED:  21183960, 482768651


(2) Failure to thrive


SNOMED:  90270057


Qualifiers:  


   Qualified Codes:  R62.7 - Adult failure to thrive


(3) Staghorn calculus


ICD Codes:  N20.0 - Calculus of kidney


SNOMED:  209149270


(4) Hydronephrosis


ICD Codes:  N13.30 - Unspecified hydronephrosis


SNOMED:  69109585


(5) Encounter for generalized patient complaints


ICD Codes:  Z00.8 - Encounter for other general examination


SNOMED:  186857685


(6) Dementia with behavioral disturbance


ICD Codes:  F03.91 - Unspecified dementia with behavioral disturbance


SNOMED:  3578388143057


Status:  stable


Assessment/Plan:


64 year old male admitted from facility for acute encephalopathy, Failure to 

thrive and dementia with behavioral disturbance





1. Acute encephalopathy/ dementia with behavioral disturbance. Doubt metabolic, 

based on reviewing his labs. His renal function is at baseline.


-Will need psychiatry consult with Dr. Meyers 


-PT, patient seen ambulating independently with walker 


Case management for dc planning 





2. Obstructive uropathy with staghorn calculus.BPH.  Renal function stable. 

Resume Tamsulosin 


3. CKD stage II-III, stable 


4. Failure to thrive. 


5. Dementia with behavioral disturbance. Oriented to self only. Resume 

outpatient psych meds. Risperidone 2mg at night per psychiatry and ativan prn 


6.HTN. Amlodipine


7. Etoh dependence in the past. Thiamine and folate 





has no insurance,  involved. applied for medical 





Time of this note may not reflect time of encounter. 


I spent 40 minutes on this encounter. 50% or more on counselling and care 

coordination.





Subjective


Date patient seen:  Sep 9, 2019


ROS Limited/Unobtainable:  Yes


Allergies:  


Coded Allergies:  


     No Known Allergies (Unverified , 7/23/19)


Subjective


seen and examined sitting in chair outside room, calm


walking around with walker





Objective





Last 24 Hour Vital Signs








  Date Time  Temp Pulse Resp B/P (MAP) Pulse Ox O2 Delivery O2 Flow Rate FiO2


 


9/9/19 12:00 97.8 70 19 132/83 (99) 98   


 


9/9/19 09:04  63  135/81    


 


9/9/19 09:00      Room Air  


 


9/9/19 08:00 97.4 63 18 135/81 (99) 97   


 


9/9/19 00:00 97.0 60 19 145/79 (101) 95   


 


9/8/19 21:00      Room Air  


 


9/8/19 20:00 97.3 64 19 155/81 (105) 98   


 


9/8/19 16:00 98.2 88 19 155/91 (112) 98   

















Intake and Output  


 


 9/8/19 9/9/19





 19:00 07:00


 


Intake Total 840 ml 480 ml


 


Balance 840 ml 480 ml


 


  


 


Intake Oral 840 ml 480 ml


 


# Voids 10 5








Height (Feet):  5


Height (Inches):  10.00


Weight (Pounds):  176


Objective


General Appearance:  alert, no distress 


Head:  normocephalic, atraumatic


Eyes:  bilateral eye PERRL, bilateral eye EOMI


ENT:  uvula midline, moist mucus membranes


Neck:  supple, no jvd 


Respiratory:  lungs clear, no respiratory distress, no retraction, no accessory 

muscle use


Cardiovascular :  regular rate, rhythm, no edema, no gallop, no murmur


Gastrointestinal:  non tender, soft, no guarding, no rebound


Musculoskeletal:  moves all extremities 


Neurologic:  alert, oriented to self only


Psychiatric:  mood/affect flat. annoyed 


Skin:  no ulcers, tattoos











Zack Nichols M.D. Sep 9, 2019 13:41

## 2019-09-09 NOTE — NUR
NURSE NOTES:

Received patient awake ambulating in bed. Able to verbalize needs. No s/s of acute distress. 
Waiting for placement.

## 2019-09-09 NOTE — PROGRESS NOTE
DATE:  09/09/2019

SUBJECTIVE:  The patient is seen in bed, asleep, arousable with verbal

stimuli.  The patient is able to ambulate now, awaiting placement.  The

cognition is improved.  Able to answer the questions appropriately.



MENTAL STATUS EXAMINATION:  Alert and oriented times self, place, and

situation.  Mood is neutral.  Affect is flat.  Thought process is

concrete.  Thought content, no suicidal or homicidal ideations.



ASSESSMENT:  Acute encephalopathy, improved.



PLAN:

1. We will continue current treatment.

2. Provide the patient with reality orientation and supportive

therapy.









  ______________________________________________

  Morris Meyers M.D.





DR:  REJI

D:  09/09/2019 22:29

T:  09/09/2019 23:09

JOB#:  [id number]

CC:

## 2019-09-10 VITALS — SYSTOLIC BLOOD PRESSURE: 144 MMHG | DIASTOLIC BLOOD PRESSURE: 86 MMHG

## 2019-09-10 VITALS — DIASTOLIC BLOOD PRESSURE: 90 MMHG | SYSTOLIC BLOOD PRESSURE: 142 MMHG

## 2019-09-10 VITALS — DIASTOLIC BLOOD PRESSURE: 75 MMHG | SYSTOLIC BLOOD PRESSURE: 121 MMHG

## 2019-09-10 VITALS — DIASTOLIC BLOOD PRESSURE: 91 MMHG | SYSTOLIC BLOOD PRESSURE: 157 MMHG

## 2019-09-10 VITALS — DIASTOLIC BLOOD PRESSURE: 93 MMHG | SYSTOLIC BLOOD PRESSURE: 154 MMHG

## 2019-09-10 VITALS — SYSTOLIC BLOOD PRESSURE: 131 MMHG | DIASTOLIC BLOOD PRESSURE: 73 MMHG

## 2019-09-10 RX ADMIN — HEPARIN SODIUM SCH UNITS: 5000 INJECTION INTRAVENOUS; SUBCUTANEOUS at 20:11

## 2019-09-10 RX ADMIN — DOCUSATE SODIUM SCH MG: 100 CAPSULE, LIQUID FILLED ORAL at 09:56

## 2019-09-10 RX ADMIN — HEPARIN SODIUM SCH UNITS: 5000 INJECTION INTRAVENOUS; SUBCUTANEOUS at 09:59

## 2019-09-10 RX ADMIN — TAMSULOSIN HYDROCHLORIDE SCH MG: 0.4 CAPSULE ORAL at 09:56

## 2019-09-10 RX ADMIN — Medication SCH MG: at 09:56

## 2019-09-10 RX ADMIN — TAMSULOSIN HYDROCHLORIDE SCH MG: 0.4 CAPSULE ORAL at 17:06

## 2019-09-10 RX ADMIN — LORAZEPAM PRN MG: 1 TABLET ORAL at 12:33

## 2019-09-10 RX ADMIN — DOCUSATE SODIUM SCH MG: 100 CAPSULE, LIQUID FILLED ORAL at 20:10

## 2019-09-10 RX ADMIN — LORAZEPAM PRN MG: 1 TABLET ORAL at 04:19

## 2019-09-10 NOTE — NUR
NURSE NOTES:

Received a report from SALONI Moffett. [id number]. Able to make needs known. IV site is patent and 
intact. Bed in lowest position. Bed alarm is on. Call light within reach. Will continue to 
monitor.

## 2019-09-10 NOTE — NUR
Social Service Note



Patient not appropriate for Rec[facility]ative [facility] setting.  Patient is incontinent, periods of 
confusion with noted unspecified dementia with behavioral disturbances.  Per [facility] 
patient not appropriate to complete medi-taryn application at this time.  [facility] 
attempting to locate medi-t[facility] worker in [facility] where patient initiated application.  
Patient appears he will require SNF placement upon discharge.  Will continue to monitor and 
assist.

## 2019-09-10 NOTE — NUR
CASE MANAGEMENT:REVIEW



9/10/19

SI:FTT. GRAVELY DISABLED

98.1   68  18  154/93   98% on ra



IS: NORVASC PO QD

LEXAPRO PO QD

RISPERDAL PO QHS

FLOMAX PO BID

ATIVAN PO Q6HRS PRN

IVF@75/HR

HEPARIN SQ Q12

**: MED/SURG STATUS 4 EAST





PLAN:

PATIENT'S INSURANCE HAS TERMED AND HE DOESN'T HAVE ANY INCOME

APPLYING FOR MCAL TO BE RE-INSTATED

## 2019-09-10 NOTE — GENERAL PROGRESS NOTE
Assessment/Plan


Problem List:  


(1) Acute encephalopathy


ICD Codes:  G93.40 - Encephalopathy, unspecified


SNOMED:  83683020, 281935167


(2) Failure to thrive


SNOMED:  47218374


Qualifiers:  


   Qualified Codes:  R62.7 - Adult failure to thrive


(3) Staghorn calculus


ICD Codes:  N20.0 - Calculus of kidney


SNOMED:  164383791


(4) Hydronephrosis


ICD Codes:  N13.30 - Unspecified hydronephrosis


SNOMED:  02922429


(5) Encounter for generalized patient complaints


ICD Codes:  Z00.8 - Encounter for other general examination


SNOMED:  833834763


(6) Dementia with behavioral disturbance


ICD Codes:  F03.91 - Unspecified dementia with behavioral disturbance


SNOMED:  5073640236877


Status:  stable


Assessment/Plan:


64 year old male admitted from facility for acute encephalopathy, Failure to 

thrive and dementia with behavioral disturbance





1. Acute encephalopathy/ dementia with behavioral disturbance. Doubt metabolic, 

based on reviewing his labs. His renal function is at baseline.


-Will need psychiatry consult with Dr. Meyers 


-PT, patient seen ambulating independently with walker 


Case management for dc planning 





2. Obstructive uropathy with staghorn calculus.BPH.  Renal function stable. 

Resume Tamsulosin 


3. CKD stage II-III, stable 


4. Failure to thrive. 


5. Dementia with behavioral disturbance. Oriented to self only. Resume 

outpatient psych meds. Risperidone 2mg at night per psychiatry and ativan prn 


6.HTN. Amlodipine


7. Etoh dependence in the past. Thiamine and folate 





has no insurance,  involved. applied for medical 





Time of this note may not reflect time of encounter. 


I spent 40 minutes on this encounter. 50% or more on counselling and care 

coordination.





Subjective


Date patient seen:  Sep 10, 2019


ROS Limited/Unobtainable:  Yes


Allergies:  


Coded Allergies:  


     No Known Allergies (Unverified , 7/23/19)


Subjective


seen and examined sitting in chair outside room, calm


walking around with walker





Objective





Last 24 Hour Vital Signs








  Date Time  Temp Pulse Resp B/P (MAP) Pulse Ox O2 Delivery O2 Flow Rate FiO2


 


9/10/19 12:00 98.1 68 18 154/93 (113) 98   


 


9/10/19 09:56  62  144/86    


 


9/10/19 08:42      Room Air  


 


9/10/19 08:20 97.2 62 18 144/86 (105) 98   


 


9/10/19 04:00 96.8 96 20 121/75 (90) 96   


 


9/10/19 00:00 97.6 61 17 142/90 (107) 96   


 


9/9/19 22:55      Room Air  


 


9/9/19 20:00 97.5 62 18 129/70 (89) 96   


 


9/9/19 16:00 97.4 63 18 135/81 (99) 97   

















Intake and Output  


 


 9/9/19 9/10/19





 19:00 07:00


 


Intake Total 880 ml 300 ml


 


Balance 880 ml 300 ml


 


  


 


Intake Oral 880 ml 300 ml


 


# Voids 6 3


 


# Bowel Movements 2 








Height (Feet):  5


Height (Inches):  10.00


Weight (Pounds):  176


Objective


General Appearance:  alert, no distress 


Head:  normocephalic, atraumatic


Eyes:  bilateral eye PERRL, bilateral eye EOMI


ENT:  uvula midline, moist mucus membranes


Neck:  supple, no jvd 


Respiratory:  lungs clear, no respiratory distress, no retraction, no accessory 

muscle use


Cardiovascular :  regular rate, rhythm, no edema, no gallop, no murmur


Gastrointestinal:  non tender, soft, no guarding, no rebound


Musculoskeletal:  moves all extremities 


Neurologic:  alert, oriented to self only


Psychiatric:  mood/affect flat. annoyed 


Skin:  no ulcers, tattoos











Zack Nichols M.D. Sep 10, 2019 13:59

## 2019-09-10 NOTE — NUR
nurse notes

received patient asleep , but easily arousable , no sign of distress, HL patent, on fall 
precaution , instructed patient to call for help, verbalized understanding, will continue to 
monitor patient condtion



reji barnes

## 2019-09-10 NOTE — NUR
RD ASSESSMENT & RECOMMENDATIONS

SEE CARE ACTIVITY FOR COMPLETE ASSESSMENT



DAILY ESTIMATED NEEDS:

Needs based on Cardiac 80kg adj  

25-30  kcals/kg 

5458-6204  total kcals

1-1.2  g protein/kg

80-96  g total protein 

25-30  mL/kg

6634-9540  total fluid mLs



NUTRITION DIAGNOSIS:

Altered nutrition related lab values r/t clinical status as evidenced by

elev Creat(1.4), elev AST, elev lipase (446), elev BP (154/93).





CURRENT DIET:Regular     



 

PO DIET RECOMMENDATIONS:

LOW NA diet / soft easy chew  



  



ADDITIONAL RECOMMENDATIONS:

1) Obtain a standing weight as able / or calibrated bed scale wt 

2) Monitor lytes daily, replete as needed  

3) Snacks BID in b/w meals  

4) H/o C-diff, rec probiotics 

    Hold COLACE with diarrhea

## 2019-09-11 VITALS — DIASTOLIC BLOOD PRESSURE: 79 MMHG | SYSTOLIC BLOOD PRESSURE: 129 MMHG

## 2019-09-11 VITALS — SYSTOLIC BLOOD PRESSURE: 121 MMHG | DIASTOLIC BLOOD PRESSURE: 74 MMHG

## 2019-09-11 VITALS — SYSTOLIC BLOOD PRESSURE: 154 MMHG | DIASTOLIC BLOOD PRESSURE: 80 MMHG

## 2019-09-11 VITALS — DIASTOLIC BLOOD PRESSURE: 65 MMHG | SYSTOLIC BLOOD PRESSURE: 141 MMHG

## 2019-09-11 VITALS — DIASTOLIC BLOOD PRESSURE: 73 MMHG | SYSTOLIC BLOOD PRESSURE: 135 MMHG

## 2019-09-11 VITALS — DIASTOLIC BLOOD PRESSURE: 77 MMHG | SYSTOLIC BLOOD PRESSURE: 125 MMHG

## 2019-09-11 RX ADMIN — HEPARIN SODIUM SCH UNITS: 5000 INJECTION INTRAVENOUS; SUBCUTANEOUS at 20:29

## 2019-09-11 RX ADMIN — TAMSULOSIN HYDROCHLORIDE SCH MG: 0.4 CAPSULE ORAL at 09:19

## 2019-09-11 RX ADMIN — Medication SCH MG: at 09:18

## 2019-09-11 RX ADMIN — DOCUSATE SODIUM SCH MG: 100 CAPSULE, LIQUID FILLED ORAL at 20:29

## 2019-09-11 RX ADMIN — TAMSULOSIN HYDROCHLORIDE SCH MG: 0.4 CAPSULE ORAL at 17:22

## 2019-09-11 RX ADMIN — HEPARIN SODIUM SCH UNITS: 5000 INJECTION INTRAVENOUS; SUBCUTANEOUS at 09:20

## 2019-09-11 RX ADMIN — DOCUSATE SODIUM SCH MG: 100 CAPSULE, LIQUID FILLED ORAL at 09:18

## 2019-09-11 NOTE — PROGRESS NOTE
DATE:  09/10/2019

SUBJECTIVE:  The patient is doing well.  No new event.  The patient is in

no acute distress, ambulating.  Sleep, appetite is adequate.



MENTAL STATUS EXAMINATION:  The patient is alert, oriented x3.  Mood is

neutral to anxious.  Affect is constricted.  Congruent mood.  Thought

process, linear and goal oriented.  Thought content, no suicidal or

homicidal ideations.



ASSESSMENT:  Stable.



PLAN:

1. We will continue current medications.

2. Provide the patient with reality orientation and supportive

therapy.









  ______________________________________________

  Morris Meyers M.D.





DR:  REJI

D:  09/10/2019 22:17

T:  09/11/2019 01:42

JOB#:  [id number]

CC:

## 2019-09-11 NOTE — NUR
CASE MANAGEMENT:REVIEW



9/11/19

SI:FTT. GRAVELY DISABLED

97.0  88  20  135/73   97% ON RA



IS: NORVASC PO QD

LEXAPRO PO QD

RISPERDAL PO QHS

FLOMAX PO BID

ATIVAN PO Q6HRS PRN

IVF@75/HR

HEPARIN SQ Q12

**: MED/SURG STATUS 4 EAST





PLAN:

PATIENT'S INSURANCE HAS TERMED AND HE DOESN'T HAVE ANY INCOME

APPLYING FOR MCAL TO BE RE-INSTATED

NEEDS SNF PLACEMENT BECAUSE OF INCONTINENCE OF URINE AND STOOL

## 2019-09-11 NOTE — GENERAL PROGRESS NOTE
Assessment/Plan


Problem List:  


(1) Acute encephalopathy


ICD Codes:  G93.40 - Encephalopathy, unspecified


SNOMED:  55132640, 767298281


(2) Failure to thrive


SNOMED:  92634840


Qualifiers:  


   Qualified Codes:  R62.7 - Adult failure to thrive


(3) Staghorn calculus


ICD Codes:  N20.0 - Calculus of kidney


SNOMED:  174569837


(4) Hydronephrosis


ICD Codes:  N13.30 - Unspecified hydronephrosis


SNOMED:  49334440


(5) Encounter for generalized patient complaints


ICD Codes:  Z00.8 - Encounter for other general examination


SNOMED:  235589188


(6) Dementia with behavioral disturbance


ICD Codes:  F03.91 - Unspecified dementia with behavioral disturbance


SNOMED:  7166869704373


Status:  stable


Assessment/Plan:


64 year old male admitted from facility for acute encephalopathy, Failure to 

thrive and dementia with behavioral disturbance





1. Acute encephalopathy/ dementia with behavioral disturbance. Doubt metabolic, 

based on reviewing his labs. His renal function is at baseline.


-Will need psychiatry consult with Dr. Meyers 


-PT, patient seen ambulating independently with walker 


Case management for dc planning 





2. Obstructive uropathy with staghorn calculus.BPH.  Renal function stable. 

Resume Tamsulosin 


3. CKD stage II-III, stable 


4. Failure to thrive. 


5. Dementia with behavioral disturbance. Oriented to self only. Resume 

outpatient psych meds. Risperidone 2mg at night per psychiatry and ativan prn 


6.HTN. Amlodipine


7. Etoh dependence in the past. Thiamine and folate 





has no insurance,  involved. applied for medical 





Time of this note may not reflect time of encounter. 


I spent 40 minutes on this encounter. 50% or more on counselling and care 

coordination.





Subjective


Allergies:  


Coded Allergies:  


     No Known Allergies (Unverified , 7/23/19)


Subjective


seen and examined sitting in chair outside room, calm walking around with 

walker. Behavior much better





Objective





Last 24 Hour Vital Signs








  Date Time  Temp Pulse Resp B/P (MAP) Pulse Ox O2 Delivery O2 Flow Rate FiO2


 


9/11/19 09:18  88  135/73    


 


9/11/19 08:00 97.0 88 20 135/73 (93) 97   


 


9/11/19 04:00 97.4 61 20 129/79 (96) 96   


 


9/11/19 00:00 97.5 69 18 121/74 (90) 96   


 


9/10/19 21:00      Room Air  


 


9/10/19 19:55 97.7 85 18 131/73 (92) 97   


 


9/10/19 16:00 97.9 68 18 157/91 (113) 98   


 


9/10/19 12:00 98.1 68 18 154/93 (113) 98   


 


9/10/19 09:56  62  144/86    

















Intake and Output  


 


 9/10/19 9/11/19





 19:00 07:00


 


Intake Total 1180 ml 


 


Output Total  200 ml


 


Balance 1180 ml -200 ml


 


  


 


Intake Oral 1180 ml 


 


Output Urine Total  200 ml


 


# Voids 9 1








Height (Feet):  5


Height (Inches):  10.00


Weight (Pounds):  177


Objective


General Appearance:  alert, no distress 


Head:  normocephalic, atraumatic


Eyes:  bilateral eye PERRL, bilateral eye EOMI


ENT:  uvula midline, moist mucus membranes


Neck:  supple, no jvd 


Respiratory:  lungs clear, no respiratory distress, no retraction, no accessory 

muscle use


Cardiovascular :  regular rate, rhythm, no edema, no gallop, no murmur


Gastrointestinal:  non tender, soft, no guarding, no rebound


Musculoskeletal:  moves all extremities 


Neurologic:  alert, oriented to self only


Psychiatric:  mood/affect flat. annoyed 


Skin:  no ulcers, tattoos











Zack Nichols M.D. Sep 11, 2019 09:34

## 2019-09-11 NOTE — NUR
NURSE NOTES:

Patient awake, alert x3, forgetful; siting and eating breakfast; on room air, no sing of 
distress and shortness of breath; IV RFA 22G flushes well; side rails up x2, breaks engaged, 
bed at lowest position; walker at the bed side and within reach; call light within reach; 
will keep monitoring.

## 2019-09-11 NOTE — NUR
NURSE NOTES:

Patient was in the bathroom at time of rounds. Walker near patient. Reminded pt to call 
staff for assistance. No complaint at this time. Pt just want some more snacks. Will 
continue to monitor.

## 2019-09-12 VITALS — SYSTOLIC BLOOD PRESSURE: 130 MMHG | DIASTOLIC BLOOD PRESSURE: 77 MMHG

## 2019-09-12 VITALS — DIASTOLIC BLOOD PRESSURE: 80 MMHG | SYSTOLIC BLOOD PRESSURE: 127 MMHG

## 2019-09-12 VITALS — SYSTOLIC BLOOD PRESSURE: 137 MMHG | DIASTOLIC BLOOD PRESSURE: 73 MMHG

## 2019-09-12 VITALS — DIASTOLIC BLOOD PRESSURE: 77 MMHG | SYSTOLIC BLOOD PRESSURE: 132 MMHG

## 2019-09-12 VITALS — DIASTOLIC BLOOD PRESSURE: 78 MMHG | SYSTOLIC BLOOD PRESSURE: 143 MMHG

## 2019-09-12 VITALS — DIASTOLIC BLOOD PRESSURE: 74 MMHG | SYSTOLIC BLOOD PRESSURE: 139 MMHG

## 2019-09-12 RX ADMIN — DOCUSATE SODIUM SCH MG: 100 CAPSULE, LIQUID FILLED ORAL at 20:45

## 2019-09-12 RX ADMIN — TAMSULOSIN HYDROCHLORIDE SCH MG: 0.4 CAPSULE ORAL at 09:23

## 2019-09-12 RX ADMIN — HEPARIN SODIUM SCH UNITS: 5000 INJECTION INTRAVENOUS; SUBCUTANEOUS at 20:46

## 2019-09-12 RX ADMIN — DOCUSATE SODIUM SCH MG: 100 CAPSULE, LIQUID FILLED ORAL at 09:23

## 2019-09-12 RX ADMIN — TAMSULOSIN HYDROCHLORIDE SCH MG: 0.4 CAPSULE ORAL at 17:15

## 2019-09-12 RX ADMIN — Medication SCH MG: at 09:23

## 2019-09-12 RX ADMIN — HEPARIN SODIUM SCH UNITS: 5000 INJECTION INTRAVENOUS; SUBCUTANEOUS at 09:24

## 2019-09-12 NOTE — NUR
NURSE NOTES:

Patient awake, sitting on the bed and eating his breakfast; on room air, no sign of distress 
and shortness of breath; no sing of chest pain; bed at lowest position, side rails up x2, 
breaks engaged; call light and walker within reach; will keep monitoring.

## 2019-09-12 NOTE — PROGRESS NOTE
DATE:  09/11/2019

SUBJECTIVE:  The patient is doing well.  No acute distress.  He has some

anxiety and participates in physical therapy.  Acute encephalopathy

improved.  He is redirectable.  Sleep and appetite is adequate.  He

continues to have poor memory and is forgetful.



MENTAL STATUS EXAMINATION:  The patient is alert, oriented x3.  Mood is

neutral to anxious.  Affect is constricted, congruent with mood.  Thought

process is concrete.  Thought content, no suicidal or homicidal

ideations.



ASSESSMENT:

1. Dementia.

2. Acute encephalopathy, improved.



PLAN:

1. We will continue risperidone.

2. Continue the Ativan p.r.n.

3. Provide the patient with reality orientation and supportive

therapy.









  ______________________________________________

  Morris Meyers M.D.





DR:  REJI

D:  09/11/2019 23:20

T:  09/12/2019 00:14

JOB#:  [id number]

CC:

## 2019-09-12 NOTE — NUR
CASE MANAGEMENT:REVIEW



9/12/19

SI:FTT. GRAVELY DISABLED

97.7   61  20  130/77  98% ON RA



IS: NORVASC PO QD

LEXAPRO PO QD

RISPERDAL PO QHS

FLOMAX PO BID

ATIVAN PO Q6HRS PRN

IVF@75/HR

HEPARIN SQ Q12

**: MED/SURG STATUS 4 EAST





PLAN:

PATIENT'S INSURANCE HAS TERMED AND HE DOESN'T HAVE ANY INCOME

APPLYING FOR [facility]AL TO BE RE-INSTATED

NEEDS SNF PLACEMENT BECAUSE OF INCONTINENCE OF URINE AND STOOL

## 2019-09-12 NOTE — PROGRESS NOTE
DATE:  09/12/2019

SUBJECTIVE:  The patient is able to answer the questions appropriately.  He

is still very forgetful and has episodes of confusion.  Compliant with

medications.  At times anxious.  No suicidal or homicidal ideation.



MENTAL STATUS EXAMINATION:  The patient is alert, oriented times self,

place, and the year.  He is in no acute distress.  Mood is neutral.

Affect is full range.  Thought process is concrete.  Thought content, no

suicidal or homicidal ideation.



ASSESSMENT:  Stable.



PLAN:

1. We will continue current medications.

2. Provide the patient with reality orientation and supportive

therapy.









  ______________________________________________

  Morris Meyers M.D.





DR:  PRATIMA

D:  09/12/2019 22:56

T:  09/12/2019 23:31

JOB#:  [id number]

CC:

## 2019-09-12 NOTE — GENERAL PROGRESS NOTE
Assessment/Plan


Problem List:  


(1) Acute encephalopathy


ICD Codes:  G93.40 - Encephalopathy, unspecified


SNOMED:  25078500, 669374594


(2) Failure to thrive


SNOMED:  06244402


Qualifiers:  


   Qualified Codes:  R62.7 - Adult failure to thrive


(3) Staghorn calculus


ICD Codes:  N20.0 - Calculus of kidney


SNOMED:  137170920


(4) Hydronephrosis


ICD Codes:  N13.30 - Unspecified hydronephrosis


SNOMED:  68541711


(5) Encounter for generalized patient complaints


ICD Codes:  Z00.8 - Encounter for other general examination


SNOMED:  897153566


(6) Dementia with behavioral disturbance


ICD Codes:  F03.91 - Unspecified dementia with behavioral disturbance


SNOMED:  2254261808736


Status:  stable


Assessment/Plan:


64 year old male admitted from facility for acute encephalopathy, Failure to 

thrive and dementia with behavioral disturbance





1. Acute encephalopathy/ dementia with behavioral disturbance. Doubt metabolic, 

based on reviewing his labs. His renal function is at baseline.


-Will need psychiatry consult with Dr. Meyers 


-PT, patient seen ambulating independently with walker 


Case management for dc planning 





2. Obstructive uropathy with staghorn calculus.BPH.  Renal function stable. 

Resume Tamsulosin 


3. CKD stage II-III, stable 


4. Failure to thrive. 


5. Dementia with behavioral disturbance. Oriented to self only. Resume 

outpatient psych meds. Risperidone 2mg at night per psychiatry and ativan prn 


6.HTN. Amlodipine


7. Etoh dependence in the past. Thiamine and folate 





has no insurance,  involved. applied for medical 





Time of this note may not reflect time of encounter. 


I spent 40 minutes on this encounter. 50% or more on counselling and care 

coordination.





Subjective


Allergies:  


Coded Allergies:  


     No Known Allergies (Unverified , 7/23/19)


Subjective


seen and examined sitting in chair outside room, calm walking around with 

walker. Behavior much better





Objective





Last 24 Hour Vital Signs








  Date Time  Temp Pulse Resp B/P (MAP) Pulse Ox O2 Delivery O2 Flow Rate FiO2


 


9/12/19 09:23  61  130/77    


 


9/12/19 08:00 97.7 61 20 130/77 (94) 98   


 


9/12/19 03:39 97.5 55 20 139/74 (95) 97   


 


9/12/19 00:00 98.2 61 20 137/73 (94) 96   


 


9/11/19 21:00      Room Air  


 


9/11/19 20:00 98.2 69 20 141/65 (90) 96   


 


9/11/19 16:00 98.0 20 20 125/77 (93) 96   


 


9/11/19 12:00 97.7 69 20 154/80 (104) 96   

















Intake and Output  


 


 9/11/19 9/12/19





 19:00 07:00


 


Intake Total 440 ml 


 


Balance 440 ml 


 


  


 


Intake Oral 440 ml 


 


# Voids 5 7


 


# Bowel Movements 2 








Height (Feet):  5


Height (Inches):  10.00


Weight (Pounds):  177


Objective


General Appearance:  alert, no distress 


Head:  normocephalic, atraumatic


Eyes:  bilateral eye PERRL, bilateral eye EOMI


ENT:  uvula midline, moist mucus membranes


Neck:  supple, no jvd 


Respiratory:  lungs clear, no respiratory distress, no retraction, no accessory 

muscle use


Cardiovascular :  regular rate, rhythm, no edema, no gallop, no murmur


Gastrointestinal:  non tender, soft, no guarding, no rebound


Musculoskeletal:  moves all extremities 


Neurologic:  alert, oriented to self only


Psychiatric:  mood/affect flat. annoyed 


Skin:  no ulcers, tattoos











Zack Nichols M.D. Sep 12, 2019 10:01

## 2019-09-12 NOTE — NUR
NURSE NOTES:

Received patient in bed, awake, alert, confused at times, ambulating with a walker, IV site 
is clean dry and intact, no acute distress noted. Call light is within reach, bed is locked, 
lowered, alarm is on. Will continue to monitor for safety and comfort.

## 2019-09-13 VITALS — SYSTOLIC BLOOD PRESSURE: 145 MMHG | DIASTOLIC BLOOD PRESSURE: 79 MMHG

## 2019-09-13 VITALS — DIASTOLIC BLOOD PRESSURE: 78 MMHG | SYSTOLIC BLOOD PRESSURE: 142 MMHG

## 2019-09-13 VITALS — DIASTOLIC BLOOD PRESSURE: 86 MMHG | SYSTOLIC BLOOD PRESSURE: 160 MMHG

## 2019-09-13 VITALS — DIASTOLIC BLOOD PRESSURE: 74 MMHG | SYSTOLIC BLOOD PRESSURE: 141 MMHG

## 2019-09-13 VITALS — SYSTOLIC BLOOD PRESSURE: 140 MMHG | DIASTOLIC BLOOD PRESSURE: 68 MMHG

## 2019-09-13 VITALS — SYSTOLIC BLOOD PRESSURE: 136 MMHG | DIASTOLIC BLOOD PRESSURE: 73 MMHG

## 2019-09-13 RX ADMIN — TAMSULOSIN HYDROCHLORIDE SCH MG: 0.4 CAPSULE ORAL at 08:40

## 2019-09-13 RX ADMIN — DOCUSATE SODIUM SCH MG: 100 CAPSULE, LIQUID FILLED ORAL at 08:55

## 2019-09-13 RX ADMIN — DOCUSATE SODIUM SCH MG: 100 CAPSULE, LIQUID FILLED ORAL at 20:14

## 2019-09-13 RX ADMIN — TAMSULOSIN HYDROCHLORIDE SCH MG: 0.4 CAPSULE ORAL at 17:05

## 2019-09-13 RX ADMIN — Medication SCH MG: at 08:40

## 2019-09-13 RX ADMIN — HEPARIN SODIUM SCH UNITS: 5000 INJECTION INTRAVENOUS; SUBCUTANEOUS at 20:17

## 2019-09-13 RX ADMIN — HEPARIN SODIUM SCH UNITS: 5000 INJECTION INTRAVENOUS; SUBCUTANEOUS at 08:42

## 2019-09-13 NOTE — NUR
NURSE NOTES:

Received patient in bed, awake, alert, oriented x2/3, confusion noted, patient can ambulate 
with a walker. VSS, afebrile, no acute distress noted. Call light is within reach, bed is 
locked, lowered and alarm is on. Will continue to monitor for comfort and safety.

## 2019-09-13 NOTE — NUR
CASE MANAGEMENT:REVIEW



9/13/19

SI:FTT. GRAVELY DISABLED

98.0   79  19  160/86  99% ON RA



IS: NORVASC PO QD

LEXAPRO PO QD

RISPERDAL PO QHS

FLOMAX PO BID

ATIVAN PO Q6HRS PRN

IVF@75/HR

HEPARIN SQ Q12

**: MED/SURG STATUS 4 EAST





PLAN:

PATIENT'S INSURANCE HAS TERMED AND HE DOESN'T HAVE ANY INCOME

APPLYING FOR [facility] TO BE RE-INSTATED

NEEDS SNF PLACEMENT BECAUSE OF INCONTINENCE OF URINE AND STOOL

## 2019-09-13 NOTE — GENERAL PROGRESS NOTE
Assessment/Plan


Problem List:  


(1) Acute encephalopathy


ICD Codes:  G93.40 - Encephalopathy, unspecified


SNOMED:  92240049, 580245462


(2) Failure to thrive


SNOMED:  94193754


Qualifiers:  


   Qualified Codes:  R62.7 - Adult failure to thrive


(3) Staghorn calculus


ICD Codes:  N20.0 - Calculus of kidney


SNOMED:  523300764


(4) Hydronephrosis


ICD Codes:  N13.30 - Unspecified hydronephrosis


SNOMED:  28025304


(5) Encounter for generalized patient complaints


ICD Codes:  Z00.8 - Encounter for other general examination


SNOMED:  675772220


(6) Dementia with behavioral disturbance


ICD Codes:  F03.91 - Unspecified dementia with behavioral disturbance


SNOMED:  9970595675156


Status:  stable


Assessment/Plan:


64 year old male admitted from facility for acute encephalopathy, Failure to 

thrive and dementia with behavioral disturbance





1. Acute encephalopathy/ dementia with behavioral disturbance. Doubt metabolic, 

based on reviewing his labs. His renal function is at baseline.


-Will need psychiatry consult with Dr. Meyers 


-PT, patient seen ambulating independently with walker 


Case management for dc planning 





2. Obstructive uropathy with staghorn calculus.BPH.  Renal function stable. 

Resume Tamsulosin 


3. CKD stage II-III, stable. Repeat labs 9/14 


4. Failure to thrive. 


5. Dementia with behavioral disturbance. Oriented to self only. Resume 

outpatient psych meds. Risperidone 2mg at night per psychiatry and ativan prn 


6.HTN. Amlodipine 10mg, resume home Atenolol


7. Etoh dependence in the past. Thiamine and folate 





has no insurance,  involved. applied for medical 





Time of this note may not reflect time of encounter. 


I spent 40 minutes on this encounter. 50% or more on counselling and care 

coordination.





Subjective


Date patient seen:  Sep 13, 2019


ROS Limited/Unobtainable:  Yes


Allergies:  


Coded Allergies:  


     No Known Allergies (Unverified , 7/23/19)


Subjective


seen and examined sitting in chair outside room, calm walking around with 

walker. Behavior much better. Needs better BP control





Objective





Last 24 Hour Vital Signs








  Date Time  Temp Pulse Resp B/P (MAP) Pulse Ox O2 Delivery O2 Flow Rate FiO2


 


9/13/19 09:00      Room Air  


 


9/13/19 08:40  79  160/86    


 


9/13/19 08:37 98.0 79 19 160/86 (110) 99   


 


9/13/19 04:00 98.2 60 19 140/68 (92)    


 


9/13/19 00:00 99.0 64 19 145/79 (101)    


 


9/12/19 21:17      Room Air  


 


9/12/19 20:00 99.1 69 19 143/78 (99)    


 


9/12/19 16:00 97.4 72 18 132/77 (95) 97   


 


9/12/19 12:00 97.8 68 19 127/80 (96) 98   

















Intake and Output  


 


 9/12/19 9/13/19





 18:59 06:59


 


Intake Total 1620 ml 


 


Balance 1620 ml 


 


  


 


Intake Oral 1620 ml 


 


# Voids 8 


 


# Bowel Movements 2 








Height (Feet):  5


Height (Inches):  10.00


Weight (Pounds):  177


Objective


General Appearance:  alert, no distress 


Head:  normocephalic, atraumatic


Eyes:  bilateral eye PERRL, bilateral eye EOMI


ENT:  uvula midline, moist mucus membranes


Neck:  supple, no jvd 


Respiratory:  lungs clear, no respiratory distress, no retraction, no accessory 

muscle use


Cardiovascular :  regular rate, rhythm, no edema, no gallop, no murmur


Gastrointestinal:  non tender, soft, no guarding, no rebound


Musculoskeletal:  moves all extremities 


Neurologic:  alert, oriented to self only


Psychiatric:  mood/affect flat. annoyed 


Skin:  no ulcers, tattoos











Zack Nichols M.D. Sep 13, 2019 09:27

## 2019-09-14 VITALS — SYSTOLIC BLOOD PRESSURE: 130 MMHG | DIASTOLIC BLOOD PRESSURE: 78 MMHG

## 2019-09-14 VITALS — DIASTOLIC BLOOD PRESSURE: 70 MMHG | SYSTOLIC BLOOD PRESSURE: 140 MMHG

## 2019-09-14 VITALS — DIASTOLIC BLOOD PRESSURE: 79 MMHG | SYSTOLIC BLOOD PRESSURE: 144 MMHG

## 2019-09-14 VITALS — DIASTOLIC BLOOD PRESSURE: 68 MMHG | SYSTOLIC BLOOD PRESSURE: 136 MMHG

## 2019-09-14 VITALS — SYSTOLIC BLOOD PRESSURE: 137 MMHG | DIASTOLIC BLOOD PRESSURE: 66 MMHG

## 2019-09-14 VITALS — SYSTOLIC BLOOD PRESSURE: 106 MMHG | DIASTOLIC BLOOD PRESSURE: 58 MMHG

## 2019-09-14 LAB
ANION GAP SERPL CALC-SCNC: 12 MMOL/L (ref 5–15)
BUN SERPL-MCNC: 18 MG/DL (ref 7–18)
CALCIUM SERPL-MCNC: 8.9 MG/DL (ref 8.5–10.1)
CHLORIDE SERPL-SCNC: 108 MMOL/L (ref 98–107)
CO2 SERPL-SCNC: 24 MMOL/L (ref 21–32)
CREAT SERPL-MCNC: 1.3 MG/DL (ref 0.55–1.3)
POTASSIUM SERPL-SCNC: 4.1 MMOL/L (ref 3.5–5.1)
SODIUM SERPL-SCNC: 144 MMOL/L (ref 136–145)

## 2019-09-14 RX ADMIN — Medication SCH MG: at 08:36

## 2019-09-14 RX ADMIN — HEPARIN SODIUM SCH UNITS: 5000 INJECTION INTRAVENOUS; SUBCUTANEOUS at 21:20

## 2019-09-14 RX ADMIN — HEPARIN SODIUM SCH UNITS: 5000 INJECTION INTRAVENOUS; SUBCUTANEOUS at 08:37

## 2019-09-14 RX ADMIN — DOCUSATE SODIUM SCH MG: 100 CAPSULE, LIQUID FILLED ORAL at 08:38

## 2019-09-14 RX ADMIN — ATENOLOL SCH MG: 25 TABLET ORAL at 08:36

## 2019-09-14 RX ADMIN — DOCUSATE SODIUM SCH MG: 100 CAPSULE, LIQUID FILLED ORAL at 21:19

## 2019-09-14 RX ADMIN — TAMSULOSIN HYDROCHLORIDE SCH MG: 0.4 CAPSULE ORAL at 08:36

## 2019-09-14 RX ADMIN — TAMSULOSIN HYDROCHLORIDE SCH MG: 0.4 CAPSULE ORAL at 17:44

## 2019-09-14 NOTE — PROGRESS NOTE
DATE:  09/13/2019

SUBJECTIVE:  The patient's mental condition is unchanged.  He is forgetful,

has episodes of anxiety independently.  No suicidal or homicidal

ideation.



MENTAL STATUS EXAMINATION:  Alert and oriented x2.  Mood is anxious.

Affect is flat.  Thought process is concrete.  Thought content, no

suicidal or homicidal ideation.  Cognition is impaired.  Insight and

judgment is impaired.



ASSESSMENT:

1. Cognitive impairment.

2. Anxiety disorder.



PLAN:

1. We will continue current medications.

2. Provide the patient with reality orientation and supportive

therapy.









  ______________________________________________

  Morris Meyers M.D. DR:  REJI

D:  09/13/2019 21:55

T:  09/14/2019 01:45

JOB#:  [id number]

CC:



JIHAN

## 2019-09-14 NOTE — NUR
NURSE NOTES:

IV was removed per patient. Pt refused to have new IV. Pt is not on any IV fluids or 
antibiotics

## 2019-09-14 NOTE — NUR
NURSE NOTES: RECEIVED PATIENT LYING IN BED, AWAKE, ALERT/ORIENTED TO PERSON/PLACE, DENIES 
PAIN, NO SIGNS AND SYMPTOMS OF ACUTE CARDIO RESPIRATORY DISTRESS/SHORTNESS OF BREATH, DENIES 
CHEST PAIN, NO PERIPHERAL EDEMA NOTED. NO IV ACCESS, PATIENT REFUSE INSERTION. NO REPORT OF 
GI DISCOMFORT, NO N/V/D, ROOM SMELLING OF URINE, OFFERED BED BATH/SHOWER, PATIENT REFUSED, 
WILL CONTINUE TO OFFER THROUGHOUT THE NIGHT. SIDE RAILS UP X2 FOR MOBILITY, FWW AT BEDSIDE. 
DISCHARGE PLANNING ONGOING. BED IN LOWEST POSITION FOR SAFETY, ALARM ACTIVATED FOR 
PREVENTIVE MEASURES. ENCOURAGED PATIENT TO UTILIZE CALL LIGHT FOR ASSISTANCE. PROVIDED PM 
SNACK/FLUIDS. NAD.

## 2019-09-14 NOTE — GENERAL PROGRESS NOTE
Assessment/Plan


Problem List:  


(1) Staghorn calculus


ICD Codes:  N20.0 - Calculus of kidney


SNOMED:  209682919


(2) Dementia with behavioral disturbance


ICD Codes:  F03.91 - Unspecified dementia with behavioral disturbance


SNOMED:  4773332435285


(3) Acute encephalopathy


ICD Codes:  G93.40 - Encephalopathy, unspecified


SNOMED:  15733759, 320356983


(4) Failure to thrive


SNOMED:  69070467


Qualifiers:  


   Qualified Codes:  R62.7 - Adult failure to thrive


(5) Gravely disabled


SNOMED:  613040211


(6) Encounter for generalized patient complaints


ICD Codes:  Z00.8 - Encounter for other general examination


SNOMED:  385901679


Status:  stable


Assessment/Plan:


64 year old male admitted from facility for acute encephalopathy, Failure to 

thrive and dementia with behavioral disturbance





1. Acute encephalopathy/ dementia with behavioral disturbance. Doubt metabolic, 

based on reviewing his labs. His renal function is at baseline.


-Per psych, continue home meds for now. 


-PT, patient seen ambulating independently with walker 


-Case management for dc planning 





2. Obstructive uropathy with staghorn calculus.BPH.  Renal function stable. 

Resume Tamsulosin 


3. CKD stage II-III, stable. Repeat labs 9/14 -> showed improvement. 


4. Failure to thrive. 


5. Dementia with behavioral disturbance. Oriented to self only. Resume 

outpatient psych meds. Risperidone 2mg at night per psychiatry and ativan prn 


6.HTN. Amlodipine 10mg, resume home Atenolol


7. Etoh dependence in the past. Thiamine and folate 





has no insurance,  involved. applied for medical 





Time of this note may not reflect time of encounter. 


I spent 40 minutes on this encounter. 50% or more on counselling and care 

coordination.





Subjective


Date patient seen:  Sep 14, 2019


Time patient seen:  08:30


ROS Limited/Unobtainable:  No


Constitutional:  Denies: no symptoms, chills, diaphoresis, fever, malaise, 

weakness, other


HEENT:  Denies: no symptoms, eye pain, blurred vision, tearing, double vision, 

ear pain, ear discharge, nose pain, nose congestion, throat pain, throat 

swelling, mouth pain, mouth swelling, other


Cardiovascular:  Denies: no symptoms, chest pain, edema, irregular heart rate, 

lightheadedness, palpitations, syncope, other


Respiratory:  Denies: no symptoms, cough, orthopnea, shortness of breath, SOB 

with excertion, SOB at rest, sputum, stridor, wheezing, other


Gastrointestinal/Abdominal:  Denies: no symptoms, abdomen distended, abdominal 

pain, black stools, tarry stools, blood in stool, constipated, diarrhea, 

difficulty swallowing, nausea, poor appetite, poor fluid intake, rectal bleeding

, vomiting, other


Genitourinary:  Denies: no symptoms, burning, discharge, frequency, flank pain, 

hematuria, incontinence, pain, urgency, other


Neurologic/Psychiatric:  Denies: no symptoms, anxiety, depressed, emotional 

problems, headache, numbness, paresthesia, pre-existing deficit, seizure, 

tingling, tremors, weakness, other


Endocrine:  Denies: no symptoms, excessive sweating, flushing, intolerance to 

cold, intolerance to heat, increased hunger, increased thirst, increased urine, 

unexplained weight gain, unexplained weight loss, other


Hematologic/Lymphatic:  Denies: no symptoms, anemia, easy bleeding, easy 

bruising, other


Allergies:  


Coded Allergies:  


     No Known Allergies (Unverified , 7/23/19)





Objective





Last 24 Hour Vital Signs








  Date Time  Temp Pulse Resp B/P (MAP) Pulse Ox O2 Delivery O2 Flow Rate FiO2


 


9/14/19 09:00      Room Air  


 


9/14/19 08:36  93  130/78    


 


9/14/19 08:35  93  130/78    


 


9/14/19 08:00 98.5 93 18 130/78 (95) 99   


 


9/14/19 04:00 98.9 70 19 144/79 (100)    


 


9/14/19 00:00 96.9 62 19 140/70 (93)    


 


9/13/19 21:19      Room Air  


 


9/13/19 20:00 98.8 60 19 141/74 (96)    


 


9/13/19 16:00 97.9 69 18 136/73 (94) 99   

















Intake and Output  


 


 9/13/19 9/14/19





 18:59 06:59


 


Intake Total 1570 ml 


 


Balance 1570 ml 


 


  


 


Intake Oral 1570 ml 


 


# Bowel Movements 2 








Laboratory Tests


9/14/19 08:50: 


Sodium Level 144, Potassium Level 4.1, Chloride Level 108H, Carbon Dioxide 

Level 24, Anion Gap 12, Blood Urea Nitrogen 18, Creatinine 1.3, Estimat 

Glomerular Filtration Rate 55.4, Glucose Level 114H, Calcium Level 8.9


Height (Feet):  5


Height (Inches):  10.00


Weight (Pounds):  177


General Appearance:  WD/WN, no apparent distress, alert


EENT:  PERRL/EOMI


Neck:  supple


Cardiovascular:  normal rate, regular rhythm, no gallop/murmur


Respiratory/Chest:  lungs clear, normal breath sounds, no respiratory distress, 

no accessory muscle use


Abdomen:  normal bowel sounds, non tender, soft


Extremities:  normal range of motion


Edema:  no edema noted Arm (L), no edema noted Arm (R), no edema noted Leg (L), 

no edema noted Leg (R), no edema noted Pedal (L), no edema noted Pedal (R), no 

edema noted Generalized


Neurologic:  CNs II-XII grossly normal, no motor/sensory deficits, alert, 

responsive


Skin:  normal pigmentation











John Melendez M.D. Sep 14, 2019 12:14

## 2019-09-14 NOTE — NUR
NURSE NOTES:

Received pt in bed, sleeping. Room air. No s/s of pain/distress. IV on R FA 22g intact and 
patent, with saline lock. Side rails x 2. Bed in the lowest and locked. Call light within 
reach. Will continue to monitor

## 2019-09-14 NOTE — NUR
CASE MANAGEMENT:REVIEW



9/14/2019



SI:FTT. GRAVELY DISABLED

T 98.8 HR 52 RR 16 B/P 137/66 SATS 97% ON RA 

  



IS: NORVASC PO QD

LEXAPRO PO QD

RISPERDAL PO QHS

FLOMAX PO BID

ATIVAN PO Q6HRS PRN

IVF@75/HR

HEPARIN SQ Q12



**: MED/SURG STATUS 4 EAST

## 2019-09-15 VITALS — SYSTOLIC BLOOD PRESSURE: 136 MMHG | DIASTOLIC BLOOD PRESSURE: 71 MMHG

## 2019-09-15 VITALS — DIASTOLIC BLOOD PRESSURE: 69 MMHG | SYSTOLIC BLOOD PRESSURE: 129 MMHG

## 2019-09-15 VITALS — DIASTOLIC BLOOD PRESSURE: 89 MMHG | SYSTOLIC BLOOD PRESSURE: 161 MMHG

## 2019-09-15 VITALS — SYSTOLIC BLOOD PRESSURE: 128 MMHG | DIASTOLIC BLOOD PRESSURE: 77 MMHG

## 2019-09-15 VITALS — DIASTOLIC BLOOD PRESSURE: 71 MMHG | SYSTOLIC BLOOD PRESSURE: 136 MMHG

## 2019-09-15 VITALS — DIASTOLIC BLOOD PRESSURE: 68 MMHG | SYSTOLIC BLOOD PRESSURE: 133 MMHG

## 2019-09-15 RX ADMIN — TAMSULOSIN HYDROCHLORIDE SCH MG: 0.4 CAPSULE ORAL at 17:22

## 2019-09-15 RX ADMIN — ATENOLOL SCH MG: 25 TABLET ORAL at 08:53

## 2019-09-15 RX ADMIN — HEPARIN SODIUM SCH UNITS: 5000 INJECTION INTRAVENOUS; SUBCUTANEOUS at 20:49

## 2019-09-15 RX ADMIN — TAMSULOSIN HYDROCHLORIDE SCH MG: 0.4 CAPSULE ORAL at 08:51

## 2019-09-15 RX ADMIN — DOCUSATE SODIUM SCH MG: 100 CAPSULE, LIQUID FILLED ORAL at 20:47

## 2019-09-15 RX ADMIN — HEPARIN SODIUM SCH UNITS: 5000 INJECTION INTRAVENOUS; SUBCUTANEOUS at 08:53

## 2019-09-15 RX ADMIN — DOCUSATE SODIUM SCH MG: 100 CAPSULE, LIQUID FILLED ORAL at 09:00

## 2019-09-15 RX ADMIN — Medication SCH MG: at 08:51

## 2019-09-15 NOTE — NUR
NURSE NOTES:

Received pt in bed, sleeping. No s/s of distress/pain. No IV site and patient refuses for to 
have new IV inserted. Side rails x 2. Bed in the lowest and locked. Call light within reach. 
Will continue to monitor

## 2019-09-15 NOTE — GENERAL PROGRESS NOTE
Assessment/Plan


Problem List:  


(1) Staghorn calculus


ICD Codes:  N20.0 - Calculus of kidney


SNOMED:  874141479


(2) Dementia with behavioral disturbance


ICD Codes:  F03.91 - Unspecified dementia with behavioral disturbance


SNOMED:  8559588708734


(3) Acute encephalopathy


ICD Codes:  G93.40 - Encephalopathy, unspecified


SNOMED:  98427959, 261945996


(4) Failure to thrive


SNOMED:  96485514


Qualifiers:  


   Qualified Codes:  R62.7 - Adult failure to thrive


(5) Gravely disabled


SNOMED:  325299527


(6) Encounter for generalized patient complaints


ICD Codes:  Z00.8 - Encounter for other general examination


SNOMED:  281118909


Status:  stable


Assessment/Plan:


64 year old male admitted from facility for acute encephalopathy, Failure to 

thrive and dementia with behavioral disturbance





1. Acute encephalopathy/ dementia with behavioral disturbance. Doubt metabolic, 

based on reviewing his labs. His renal function is at baseline.


-Per psych, continue home meds for now. 


-PT, patient seen ambulating independently with walker 


-Case management for dc planning 





2. Obstructive uropathy with staghorn calculus.BPH.  Renal function stable. 

Resume Tamsulosin 


3. CKD stage II-III, stable. Repeat labs 9/14 -> improved. 


4. Failure to thrive. 


5. Dementia with behavioral disturbance. Oriented to self only. Resume 

outpatient psych meds. Risperidone 2mg at night per psychiatry and ativan prn 


6.HTN. Amlodipine 10mg, resume home Atenolol


7. Etoh dependence in the past. Thiamine and folate 





has no insurance,  involved. applied for medical 





Time of this note may not reflect time of encounter. 


I spent 26 minutes on this encounter. 50% or more on counselling and care 

coordination.





Subjective


Date patient seen:  Sep 15, 2019


Time patient seen:  09:20


ROS Limited/Unobtainable:  Yes


Constitutional:  Denies: no symptoms, chills, diaphoresis, fever, malaise, 

weakness, other


HEENT:  Denies: no symptoms, eye pain, blurred vision, tearing, double vision, 

ear pain, ear discharge, nose pain, nose congestion, throat pain, throat 

swelling, mouth pain, mouth swelling, other


Cardiovascular:  Denies: no symptoms, chest pain, edema, irregular heart rate, 

lightheadedness, palpitations, syncope, other


Respiratory:  Denies: no symptoms, cough, orthopnea, shortness of breath, SOB 

with excertion, SOB at rest, sputum, stridor, wheezing, other


Gastrointestinal/Abdominal:  Denies: no symptoms, abdomen distended, abdominal 

pain, black stools, tarry stools, blood in stool, constipated, diarrhea, 

difficulty swallowing, nausea, poor appetite, poor fluid intake, rectal bleeding

, vomiting, other


Genitourinary:  Denies: no symptoms, burning, discharge, frequency, flank pain, 

hematuria, incontinence, pain, urgency, other


Neurologic/Psychiatric:  Denies: no symptoms, anxiety, depressed, emotional 

problems, headache, numbness, paresthesia, pre-existing deficit, seizure, 

tingling, tremors, weakness, other


Endocrine:  Denies: no symptoms, excessive sweating, flushing, intolerance to 

cold, intolerance to heat, increased hunger, increased thirst, increased urine, 

unexplained weight gain, unexplained weight loss, other


Hematologic/Lymphatic:  Denies: no symptoms, anemia, easy bleeding, easy 

bruising, other


Allergies:  


Coded Allergies:  


     No Known Allergies (Unverified , 7/23/19)


Subjective


Denies any pain, discomfort. Resting comfortably in bed. Hungry. Wants to eat 

lunch sooner





Objective





Last 24 Hour Vital Signs








  Date Time  Temp Pulse Resp B/P (MAP) Pulse Ox O2 Delivery O2 Flow Rate FiO2


 


9/15/19 12:00 98.0 57 21 161/89 (113) 98   


 


9/15/19 09:00      Room Air  


 


9/15/19 08:53  58  128/77    


 


9/15/19 08:51  58  128/77    


 


9/15/19 08:00 97.9 58 20 128/77 (94) 98   


 


9/15/19 04:00 97.8 51 19 133/68 (89) 96   


 


9/15/19 00:00 98.9 60 19 129/69 (89) 96   


 


9/14/19 21:00      Room Air  


 


9/14/19 20:00 99.0 51 19 106/58 (74) 95   


 


9/14/19 16:00 98.8 59 18 136/68 (90) 97   

















Intake and Output  


 


 9/14/19 9/15/19





 19:00 07:00


 


Intake Total 600 ml 600 ml


 


Balance 600 ml 600 ml


 


  


 


Intake Oral 600 ml 600 ml


 


# Voids 4 7


 


# Bowel Movements 2 2








Height (Feet):  5


Height (Inches):  10.00


Weight (Pounds):  177


General Appearance:  WD/WN, no apparent distress, alert


EENT:  PERRL/EOMI


Neck:  supple


Cardiovascular:  normal rate, regular rhythm, no gallop/murmur


Respiratory/Chest:  lungs clear, normal breath sounds, no respiratory distress


Abdomen:  normal bowel sounds, non tender, soft











John Melendez M.D. Sep 15, 2019 12:22

## 2019-09-15 NOTE — NUR
Received patient from SALONI Inman, in stable condition, AOx2, able to make needs known to a 
degree, L eye blindness, denies pain at this time, no IV site, patient refuses to have one 
inserted. Patient laying in bed, watching tv, bed low&locked, side rails upx2, call light 
within reach, will continue to monitor and reassess.

## 2019-09-15 NOTE — PROGRESS NOTE
DATE:  09/15/2019

SUBJECTIVE:  The patient is doing well. The patient is

forgetful.



MENTAL STATUS EXAMINATION:  The patient is alert and oriented times self

and place.  Mood is dysphoric.  Affect is constricted.  Congruent with

mood.  Thought process, linear and goal-oriented.  Thought content, no

suicidal or homicidal ideation.



ASSESSMENT:  Stable.



PLAN:

1. We will continue current medications.

2. Provide the patient with reality orientation and supportive

therapy.









  ______________________________________________

  Morris Meyers M.D.





DR:  GIANNA

D:  09/15/2019 00:57

T:  09/15/2019 04:51

JOB#:  [id number]

CC:



JIHAN

## 2019-09-16 VITALS — DIASTOLIC BLOOD PRESSURE: 70 MMHG | SYSTOLIC BLOOD PRESSURE: 130 MMHG

## 2019-09-16 VITALS — SYSTOLIC BLOOD PRESSURE: 140 MMHG | DIASTOLIC BLOOD PRESSURE: 79 MMHG

## 2019-09-16 VITALS — DIASTOLIC BLOOD PRESSURE: 71 MMHG | SYSTOLIC BLOOD PRESSURE: 150 MMHG

## 2019-09-16 VITALS — DIASTOLIC BLOOD PRESSURE: 75 MMHG | SYSTOLIC BLOOD PRESSURE: 142 MMHG

## 2019-09-16 VITALS — SYSTOLIC BLOOD PRESSURE: 132 MMHG | DIASTOLIC BLOOD PRESSURE: 82 MMHG

## 2019-09-16 VITALS — DIASTOLIC BLOOD PRESSURE: 72 MMHG | SYSTOLIC BLOOD PRESSURE: 132 MMHG

## 2019-09-16 RX ADMIN — HEPARIN SODIUM SCH UNITS: 5000 INJECTION INTRAVENOUS; SUBCUTANEOUS at 09:07

## 2019-09-16 RX ADMIN — ATENOLOL SCH MG: 25 TABLET ORAL at 09:06

## 2019-09-16 RX ADMIN — TAMSULOSIN HYDROCHLORIDE SCH MG: 0.4 CAPSULE ORAL at 17:32

## 2019-09-16 RX ADMIN — DOCUSATE SODIUM SCH MG: 100 CAPSULE, LIQUID FILLED ORAL at 20:05

## 2019-09-16 RX ADMIN — TAMSULOSIN HYDROCHLORIDE SCH MG: 0.4 CAPSULE ORAL at 09:06

## 2019-09-16 RX ADMIN — HEPARIN SODIUM SCH UNITS: 5000 INJECTION INTRAVENOUS; SUBCUTANEOUS at 20:06

## 2019-09-16 RX ADMIN — DOCUSATE SODIUM SCH MG: 100 CAPSULE, LIQUID FILLED ORAL at 09:00

## 2019-09-16 RX ADMIN — Medication SCH MG: at 09:06

## 2019-09-16 NOTE — GENERAL PROGRESS NOTE
Assessment/Plan


Problem List:  


(1) Acute encephalopathy


ICD Codes:  G93.40 - Encephalopathy, unspecified


SNOMED:  12548045, 859034040


(2) Failure to thrive


SNOMED:  76753989


Qualifiers:  


   Qualified Codes:  R62.7 - Adult failure to thrive


(3) Staghorn calculus


ICD Codes:  N20.0 - Calculus of kidney


SNOMED:  036322544


(4) Hydronephrosis


ICD Codes:  N13.30 - Unspecified hydronephrosis


SNOMED:  28476577


(5) Encounter for generalized patient complaints


ICD Codes:  Z00.8 - Encounter for other general examination


SNOMED:  765194851


(6) Dementia with behavioral disturbance


ICD Codes:  F03.91 - Unspecified dementia with behavioral disturbance


SNOMED:  5756930955166


Status:  stable


Assessment/Plan:


64 year old male admitted from facility for acute encephalopathy, Failure to 

thrive and dementia with behavioral disturbance





1. Acute encephalopathy/ dementia with behavioral disturbance. Doubt metabolic, 

based on reviewing his labs. His renal function is at baseline.


-Will need psychiatry consult with Dr. Meyers 


-PT, patient seen ambulating independently with walker 


Case management for dc planning 





2. Obstructive uropathy with staghorn calculus.BPH.  Renal function stable. 

Resume Tamsulosin 


3. CKD stage II-III, stable. Repeat labs 9/14 


4. Failure to thrive. 


5. Dementia with behavioral disturbance. Oriented to self only. Resume 

outpatient psych meds. Risperidone 2mg at night per psychiatry and ativan prn 


6.HTN. Amlodipine 10mg, resume home Atenolol


7. Etoh dependence in the past. Thiamine and folate 





has no insurance,  involved. applied for medical 





Time of this note may not reflect time of encounter. 


I spent 40 minutes on this encounter. 50% or more on counselling and care 

coordination.





Subjective


Allergies:  


Coded Allergies:  


     No Known Allergies (Unverified , 7/23/19)


Subjective


seen and examined sitting in chair outside room, calm walking around with 

walker. Behavior much better.





Objective





Last 24 Hour Vital Signs








  Date Time  Temp Pulse Resp B/P (MAP) Pulse Ox O2 Delivery O2 Flow Rate FiO2


 


9/16/19 09:06  60  150/71    


 


9/16/19 09:06  60  150/71    


 


9/16/19 08:00 98.0 60 18 150/71 (97) 97   


 


9/16/19 04:00 97.9 64 19 132/82 (99) 98   


 


9/16/19 00:00 98.4 70 19 140/79 (99) 95   


 


9/15/19 21:00      Room Air  


 


9/15/19 20:00 97.8 57 19 136/71 (92) 98   


 


9/15/19 16:00 97.9 59 21 136/71 (92) 98   


 


9/15/19 12:00 98.0 57 21 161/89 (113) 98   

















Intake and Output  


 


 9/15/19 9/16/19





 19:00 07:00


 


Intake Total 450 ml 450 ml


 


Balance 450 ml 450 ml


 


  


 


Intake Oral 450 ml 450 ml


 


# Voids 6 7


 


# Bowel Movements  2








Height (Feet):  5


Height (Inches):  10.00


Weight (Pounds):  177


Objective


General Appearance:  alert, no distress 


Head:  normocephalic, atraumatic


Eyes:  bilateral eye PERRL, bilateral eye EOMI


ENT:  uvula midline, moist mucus membranes


Neck:  supple, no jvd 


Respiratory:  lungs clear, no respiratory distress, no retraction, no accessory 

muscle use


Cardiovascular :  regular rate, rhythm, no edema, no gallop, no murmur


Gastrointestinal:  non tender, soft, no guarding, no rebound


Musculoskeletal:  moves all extremities 


Neurologic:  alert, oriented to self only


Psychiatric:  mood/affect flat. annoyed 


Skin:  no ulcers, tattoos











Zack Nichols M.D. Sep 16, 2019 09:10

## 2019-09-16 NOTE — NUR
NURSE NOTES:

Received pt resting in bed. AAO x 2. On room air. No IV access. No c/o pain, no acute 
distress noted at this time. Encouraged pt to stay in the bed. Bed locked, lowest position, 
alarm on, side rails up x 2, call light within reach. Will continue to monitor.

## 2019-09-16 NOTE — NUR
NURSE NOTES:

Received pt in bed, AAOx2. Room air. No c/o of pain/ distress. No IV site. Side rails x 2. 
Bed in the lowest and alarm on. Call light within reach. Will continue to monitor

## 2019-09-17 VITALS — DIASTOLIC BLOOD PRESSURE: 70 MMHG | SYSTOLIC BLOOD PRESSURE: 148 MMHG

## 2019-09-17 VITALS — DIASTOLIC BLOOD PRESSURE: 73 MMHG | SYSTOLIC BLOOD PRESSURE: 151 MMHG

## 2019-09-17 VITALS — SYSTOLIC BLOOD PRESSURE: 136 MMHG | DIASTOLIC BLOOD PRESSURE: 80 MMHG

## 2019-09-17 VITALS — SYSTOLIC BLOOD PRESSURE: 142 MMHG | DIASTOLIC BLOOD PRESSURE: 77 MMHG

## 2019-09-17 VITALS — SYSTOLIC BLOOD PRESSURE: 120 MMHG | DIASTOLIC BLOOD PRESSURE: 57 MMHG

## 2019-09-17 VITALS — SYSTOLIC BLOOD PRESSURE: 130 MMHG | DIASTOLIC BLOOD PRESSURE: 75 MMHG

## 2019-09-17 RX ADMIN — HEPARIN SODIUM SCH UNITS: 5000 INJECTION INTRAVENOUS; SUBCUTANEOUS at 08:46

## 2019-09-17 RX ADMIN — ATENOLOL SCH MG: 25 TABLET ORAL at 08:45

## 2019-09-17 RX ADMIN — TAMSULOSIN HYDROCHLORIDE SCH MG: 0.4 CAPSULE ORAL at 17:11

## 2019-09-17 RX ADMIN — TAMSULOSIN HYDROCHLORIDE SCH MG: 0.4 CAPSULE ORAL at 08:45

## 2019-09-17 RX ADMIN — HEPARIN SODIUM SCH UNITS: 5000 INJECTION INTRAVENOUS; SUBCUTANEOUS at 20:40

## 2019-09-17 RX ADMIN — Medication SCH MG: at 08:45

## 2019-09-17 RX ADMIN — DOCUSATE SODIUM SCH MG: 100 CAPSULE, LIQUID FILLED ORAL at 08:46

## 2019-09-17 RX ADMIN — DOCUSATE SODIUM SCH MG: 100 CAPSULE, LIQUID FILLED ORAL at 20:40

## 2019-09-17 NOTE — GENERAL PROGRESS NOTE
Assessment/Plan


Problem List:  


(1) Acute encephalopathy


ICD Codes:  G93.40 - Encephalopathy, unspecified


SNOMED:  41782823, 664110479


(2) Failure to thrive


SNOMED:  66674675


Qualifiers:  


   Qualified Codes:  R62.7 - Adult failure to thrive


(3) Staghorn calculus


ICD Codes:  N20.0 - Calculus of kidney


SNOMED:  272226306


(4) Hydronephrosis


ICD Codes:  N13.30 - Unspecified hydronephrosis


SNOMED:  85778321


(5) Encounter for generalized patient complaints


ICD Codes:  Z00.8 - Encounter for other general examination


SNOMED:  255636929


(6) Dementia with behavioral disturbance


ICD Codes:  F03.91 - Unspecified dementia with behavioral disturbance


SNOMED:  5735732160003


Status:  stable


Assessment/Plan:


64 year old male admitted from facility for acute encephalopathy, Failure to 

thrive and dementia with behavioral disturbance





1. Acute encephalopathy/ dementia with behavioral disturbance. Doubt metabolic, 

based on reviewing his labs. His renal function is at baseline.


-Will need psychiatry consult with Dr. Meyers 


-PT, patient seen ambulating independently with walker 


Case management for dc planning 





2. Obstructive uropathy with staghorn calculus.BPH.  Renal function stable. 

Resume Tamsulosin 


3. CKD stage II-III, stable. Repeat labs 9/14 


4. Failure to thrive. 


5. Dementia with behavioral disturbance. Oriented to self only. Resume 

outpatient psych meds. Risperidone 2mg at night per psychiatry and ativan prn 


6.HTN. Amlodipine 10mg, resume home Atenolol


7. Etoh dependence in the past. Thiamine and folate 





has no insurance,  involved. applied for medical 





Time of this note may not reflect time of encounter. 


I spent 40 minutes on this encounter. 50% or more on counselling and care 

coordination.





Subjective


Allergies:  


Coded Allergies:  


     No Known Allergies (Unverified , 7/23/19)


Subjective


seen sitting on chair


behaviors are much better. 


Calm





Objective





Last 24 Hour Vital Signs








  Date Time  Temp Pulse Resp B/P (MAP) Pulse Ox O2 Delivery O2 Flow Rate FiO2


 


9/17/19 09:00      Room Air  


 


9/17/19 08:45  58  148/70    


 


9/17/19 08:44  58  148/70    


 


9/17/19 08:00 97.4 58 19 148/70 (96) 96   


 


9/17/19 04:00 97.1 53 19 151/73 (99) 95   


 


9/17/19 00:00 97.9 60 19 142/77 (98) 97   


 


9/16/19 21:00      Room Air  


 


9/16/19 20:00 98.3 57 20 132/72 (92) 97   


 


9/16/19 16:00 98.0 70 18 130/70 (90) 98   


 


9/16/19 12:00 98.0 67 18 142/75 (97) 98   

















Intake and Output  


 


 9/16/19 9/17/19





 19:00 07:00


 


Intake Total 1220 ml 


 


Balance 1220 ml 


 


  


 


Intake Oral 1220 ml 


 


# Voids 9 4


 


# Bowel Movements 3 








Height (Feet):  5


Height (Inches):  10.00


Weight (Pounds):  177


Objective


General Appearance:  alert, no distress 


Head:  normocephalic, atraumatic


Eyes:  bilateral eye PERRL, bilateral eye EOMI


ENT:  uvula midline, moist mucus membranes


Neck:  supple, no jvd 


Respiratory:  lungs clear, no respiratory distress, no retraction, no accessory 

muscle use


Cardiovascular :  regular rate, rhythm, no edema, no gallop, no murmur


Gastrointestinal:  non tender, soft, no guarding, no rebound


Musculoskeletal:  moves all extremities 


Neurologic:  alert, oriented to self only


Psychiatric:  mood/affect flat. annoyed 


Skin:  no ulcers, tattoos











Zack Nichols M.D. Sep 17, 2019 09:29

## 2019-09-17 NOTE — NUR
NURSE NOTES:

Received pt in bed, sleeping. Room air. No s/s of distress. No IV site. Side rails x 2. Bed 
in the lowest and locked. Call light within reach. Will continue to monitor

## 2019-09-17 NOTE — NUR
RD ASSESSMENT & RECOMMENDATIONS

SEE CARE ACTIVITY FOR COMPLETE ASSESSMENT



DAILY ESTIMATED NEEDS:

Needs based on Cardiac 80kg adj  

25-30  kcals/kg 

7312-2457  total kcals

1-1.2  g protein/kg

80-96  g total protein 

25-30  mL/kg

3439-9315  total fluid mLs



NUTRITION DIAGNOSIS:

Altered nutrition related lab values r/t clinical status as evidenced by

elev Creat(1.4-> now wnl), elev AST, elev lipase (446), elev BP (154/93).



CURRENT DIET:Regular     





PO DIET RECOMMENDATIONS:

LOW NA diet / soft easy chew  





ADDITIONAL RECOMMENDATIONS:

1) Obtain a standing weight as able / or calibrated bed scale wt 

2) Monitor lytes daily, replete as needed  

3) Snacks BID in b/w meals  

4) H/o C-diff, rec probiotics 

    Hold COLACE with diarrhea

## 2019-09-17 NOTE — NUR
NURSE NOTES:

PATIENT IN BED, AWAKE, ALERT TO NAME AND PLACE. RECEIVED PATIENT WITH NO IV ACCESS, PER AM 
NURSE, MD MELODY IS AWARE. CHANGED LINEN, GIVEN SNACKS. NO S/S RESPIRATORY DISTRESS. NO 
COMPLAINTS OF PAIN AT THIS TIME. BED IN LOWEST POSITION, CALL LIGHT WITHIN REACH, BED ALARM 
ON. WILL CONTINUE TO MONITOR.

## 2019-09-18 VITALS — SYSTOLIC BLOOD PRESSURE: 139 MMHG | DIASTOLIC BLOOD PRESSURE: 68 MMHG

## 2019-09-18 VITALS — DIASTOLIC BLOOD PRESSURE: 75 MMHG | SYSTOLIC BLOOD PRESSURE: 120 MMHG

## 2019-09-18 VITALS — DIASTOLIC BLOOD PRESSURE: 68 MMHG | SYSTOLIC BLOOD PRESSURE: 146 MMHG

## 2019-09-18 VITALS — DIASTOLIC BLOOD PRESSURE: 70 MMHG | SYSTOLIC BLOOD PRESSURE: 115 MMHG

## 2019-09-18 VITALS — SYSTOLIC BLOOD PRESSURE: 128 MMHG | DIASTOLIC BLOOD PRESSURE: 67 MMHG

## 2019-09-18 VITALS — SYSTOLIC BLOOD PRESSURE: 133 MMHG | DIASTOLIC BLOOD PRESSURE: 70 MMHG

## 2019-09-18 RX ADMIN — TAMSULOSIN HYDROCHLORIDE SCH MG: 0.4 CAPSULE ORAL at 08:31

## 2019-09-18 RX ADMIN — Medication SCH MG: at 08:31

## 2019-09-18 RX ADMIN — HEPARIN SODIUM SCH UNITS: 5000 INJECTION INTRAVENOUS; SUBCUTANEOUS at 08:34

## 2019-09-18 RX ADMIN — ATENOLOL SCH MG: 25 TABLET ORAL at 08:35

## 2019-09-18 RX ADMIN — DOCUSATE SODIUM SCH MG: 100 CAPSULE, LIQUID FILLED ORAL at 08:31

## 2019-09-18 RX ADMIN — HEPARIN SODIUM SCH UNITS: 5000 INJECTION INTRAVENOUS; SUBCUTANEOUS at 20:48

## 2019-09-18 RX ADMIN — DOCUSATE SODIUM SCH MG: 100 CAPSULE, LIQUID FILLED ORAL at 20:43

## 2019-09-18 RX ADMIN — ATENOLOL SCH MG: 25 TABLET ORAL at 08:32

## 2019-09-18 RX ADMIN — TAMSULOSIN HYDROCHLORIDE SCH MG: 0.4 CAPSULE ORAL at 17:18

## 2019-09-18 NOTE — GENERAL PROGRESS NOTE
Assessment/Plan


Problem List:  


(1) Acute encephalopathy


ICD Codes:  G93.40 - Encephalopathy, unspecified


SNOMED:  93159375, 970740786


(2) Failure to thrive


SNOMED:  70941658


Qualifiers:  


   Qualified Codes:  R62.7 - Adult failure to thrive


(3) Staghorn calculus


ICD Codes:  N20.0 - Calculus of kidney


SNOMED:  528960246


(4) Hydronephrosis


ICD Codes:  N13.30 - Unspecified hydronephrosis


SNOMED:  19014150


(5) Encounter for generalized patient complaints


ICD Codes:  Z00.8 - Encounter for other general examination


SNOMED:  151118416


(6) Dementia with behavioral disturbance


ICD Codes:  F03.91 - Unspecified dementia with behavioral disturbance


SNOMED:  6458756752917


Status:  stable


Assessment/Plan:


64 year old male admitted from facility for acute encephalopathy, Failure to 

thrive and dementia with behavioral disturbance





1. Acute encephalopathy/ dementia with behavioral disturbance. Doubt metabolic, 

based on reviewing his labs. His renal function is at baseline.


-Will need psychiatry consult with Dr. Meyers 


-PT, patient seen ambulating independently with walker 


Case management for dc planning 





2. Obstructive uropathy with staghorn calculus.BPH.  Renal function stable. 

Resume Tamsulosin 


3. CKD stage II-III, stable. Repeat labs 9/14 


4. Failure to thrive. 


5. Dementia with behavioral disturbance. Oriented to self only. Resume 

outpatient psych meds. Risperidone 2mg at night per psychiatry and ativan prn 


6.HTN. Amlodipine 10mg, resume home Atenolol


7. Etoh dependence in the past. Thiamine and folate 





has no insurance,  involved. applied for medical 





Time of this note may not reflect time of encounter. 


I spent 40 minutes on this encounter. 50% or more on counselling and care 

coordination.





Subjective


Date patient seen:  Sep 18, 2019


ROS Limited/Unobtainable:  No


Constitutional:  Denies: no symptoms, chills, diaphoresis, fever, malaise, 

weakness, other


HEENT:  Denies: no symptoms, eye pain, blurred vision, tearing, double vision, 

ear pain, ear discharge, nose pain, nose congestion, throat pain, throat 

swelling, mouth pain, mouth swelling, other


Cardiovascular:  Denies: no symptoms, chest pain, edema, irregular heart rate, 

lightheadedness, palpitations, syncope, other


Respiratory:  Denies: no symptoms, cough, orthopnea, shortness of breath, SOB 

with excertion, SOB at rest, sputum, stridor, wheezing, other


Gastrointestinal/Abdominal:  Denies: no symptoms, abdomen distended, abdominal 

pain, black stools, tarry stools, blood in stool, constipated, diarrhea, 

difficulty swallowing, nausea, poor appetite, poor fluid intake, rectal bleeding

, vomiting, other


Genitourinary:  Denies: no symptoms, burning, discharge, frequency, flank pain, 

hematuria, incontinence, pain, urgency, other


Endocrine:  Denies: no symptoms, excessive sweating, flushing, intolerance to 

cold, intolerance to heat, increased hunger, increased thirst, increased urine, 

unexplained weight gain, unexplained weight loss, other


Hematologic/Lymphatic:  Denies: no symptoms, anemia, easy bleeding, easy 

bruising, other


Allergies:  


Coded Allergies:  


     No Known Allergies (Unverified , 7/23/19)


Subjective


Seen and examined. No acute events. He is calm





Objective





Last 24 Hour Vital Signs








  Date Time  Temp Pulse Resp B/P (MAP) Pulse Ox O2 Delivery O2 Flow Rate FiO2


 


9/18/19 08:35  56  133/70    


 


9/18/19 08:31  56  133/70    


 


9/18/19 08:00 98.1 56 20 133/70 (91) 99   


 


9/18/19 04:27 97.9 53 18 146/68 (94) 98   


 


9/18/19 00:38 97.4 52 18 128/67 (87) 96   


 


9/17/19 21:57      Room Air  


 


9/17/19 20:15 98.5 54 18 120/57 (78) 96   


 


9/17/19 16:00 98.0 70 18 130/75 (93) 98   


 


9/17/19 12:00 97.7 63 19 136/80 (98) 97   


 


9/17/19 09:00      Room Air  

















Intake and Output  


 


 9/17/19 9/18/19





 19:00 07:00


 


Intake Total 1200 ml 360 ml


 


Balance 1200 ml 360 ml


 


  


 


Intake Oral 1200 ml 360 ml


 


# Voids 9 3


 


# Bowel Movements 3 








Height (Feet):  5


Height (Inches):  10.00


Weight (Pounds):  186


Objective


General Appearance:  alert, no distress 


Head:  normocephalic, atraumatic


Eyes:  bilateral eye PERRL, bilateral eye EOMI


ENT:  uvula midline, moist mucus membranes


Neck:  supple, no jvd 


Respiratory:  lungs clear, no respiratory distress, no retraction, no accessory 

muscle use


Cardiovascular :  regular rate, rhythm, no edema, no gallop, no murmur


Gastrointestinal:  non tender, soft, no guarding, no rebound


Musculoskeletal:  moves all extremities 


Neurologic:  alert, oriented to self only


Psychiatric:  mood/affect flat. annoyed 


Skin:  no ulcers, tattoos











Zack Nichols M.D. Sep 18, 2019 08:58

## 2019-09-18 NOTE — NUR
CASE MANAGEMENT:REVIEW



9/15/2019



SI:FTT. GRAVELY DISABLED

97.9   58  20  128/77  98% ON RA



IS: NORVASC PO QD

LEXAPRO PO QD

RISPERDAL PO QHS

FLOMAX PO BID

ATIVAN PO Q6HRS PRN

IVF@75/HR

HEPARIN SQ Q12



**: MED/SURG STATUS 4 [facility]







9/16/2019



SI:FTT. GRAVELY DISABLED

98.0   70  18  130/70   98% ON RA



IS: NORVASC PO QD

LEXAPRO PO QD

RISPERDAL PO QHS

FLOMAX PO BID

ATIVAN PO Q6HRS PRN

IVF@75/HR

HEPARIN SQ Q12

**: MED/SURG STATUS 4 [facility]





9/17/2019



SI:FTT. GRAVELY DISABLED

97.4   58  19  148/70   96% ON RA



IS: NORVASC PO QD

LEXAPRO PO QD

RISPERDAL PO QHS

FLOMAX PO BID

ATIVAN PO Q6HRS PRN

IVF@75/HR

HEPARIN SQ Q12

**: MED/SURG STATUS 4 [facility]







9/18/2019



SI:FTT. GRAVELY DISABLED

98.1   56  20  133/70   99% ON RA



IS: NORVASC PO QD

LEXAPRO PO QD

RISPERDAL PO QHS

FLOMAX PO BID

ATIVAN PO Q6HRS PRN

IVF@75/HR

HEPARIN SQ Q12

**: MED/SURG STATUS 4 [facility]

## 2019-09-18 NOTE — NUR
NURSE NOTES:

Patient awake in bed, no complaint of pain, not in acute respiratory distress. Instructed to 
use the call light for assistance. Patient is asking for snacks. Will provide patient 
according to diet. Call light in reach. Bed in lowest and lock engaged. Will continue to 
monitor.

## 2019-09-18 NOTE — NUR
NURSE NOTES:

received report from SALONI Man. patient in bed. alert. oriented. forgetful. verbally 
responsive. no respiratory distress noted. no c/o pain. no Iv. MD is aware. contact 
isolation. PPE at all times. bed in the lowest position. call light within reach. will 
continue to provide plan of care.

## 2019-09-19 VITALS — DIASTOLIC BLOOD PRESSURE: 67 MMHG | SYSTOLIC BLOOD PRESSURE: 116 MMHG

## 2019-09-19 VITALS — DIASTOLIC BLOOD PRESSURE: 68 MMHG | SYSTOLIC BLOOD PRESSURE: 127 MMHG

## 2019-09-19 VITALS — SYSTOLIC BLOOD PRESSURE: 120 MMHG | DIASTOLIC BLOOD PRESSURE: 64 MMHG

## 2019-09-19 VITALS — DIASTOLIC BLOOD PRESSURE: 64 MMHG | SYSTOLIC BLOOD PRESSURE: 121 MMHG

## 2019-09-19 VITALS — SYSTOLIC BLOOD PRESSURE: 127 MMHG | DIASTOLIC BLOOD PRESSURE: 65 MMHG

## 2019-09-19 VITALS — DIASTOLIC BLOOD PRESSURE: 66 MMHG | SYSTOLIC BLOOD PRESSURE: 138 MMHG

## 2019-09-19 RX ADMIN — Medication SCH MG: at 08:07

## 2019-09-19 RX ADMIN — TAMSULOSIN HYDROCHLORIDE SCH MG: 0.4 CAPSULE ORAL at 08:07

## 2019-09-19 RX ADMIN — TAMSULOSIN HYDROCHLORIDE SCH MG: 0.4 CAPSULE ORAL at 17:02

## 2019-09-19 RX ADMIN — HEPARIN SODIUM SCH UNITS: 5000 INJECTION INTRAVENOUS; SUBCUTANEOUS at 20:54

## 2019-09-19 RX ADMIN — ATENOLOL SCH MG: 25 TABLET ORAL at 08:07

## 2019-09-19 RX ADMIN — HEPARIN SODIUM SCH UNITS: 5000 INJECTION INTRAVENOUS; SUBCUTANEOUS at 08:09

## 2019-09-19 RX ADMIN — DOCUSATE SODIUM SCH MG: 100 CAPSULE, LIQUID FILLED ORAL at 08:07

## 2019-09-19 RX ADMIN — DOCUSATE SODIUM SCH MG: 100 CAPSULE, LIQUID FILLED ORAL at 20:54

## 2019-09-19 NOTE — GENERAL PROGRESS NOTE
Assessment/Plan


Problem List:  


(1) Acute encephalopathy


ICD Codes:  G93.40 - Encephalopathy, unspecified


SNOMED:  52957256, 755647133


(2) Failure to thrive


SNOMED:  70993731


Qualifiers:  


   Qualified Codes:  R62.7 - Adult failure to thrive


(3) Staghorn calculus


ICD Codes:  N20.0 - Calculus of kidney


SNOMED:  885431960


(4) Hydronephrosis


ICD Codes:  N13.30 - Unspecified hydronephrosis


SNOMED:  24142132


(5) Encounter for generalized patient complaints


ICD Codes:  Z00.8 - Encounter for other general examination


SNOMED:  085426882


(6) Dementia with behavioral disturbance


ICD Codes:  F03.91 - Unspecified dementia with behavioral disturbance


SNOMED:  4307103519435


Status:  stable


Assessment/Plan:


64 year old male admitted from facility for acute encephalopathy, Failure to 

thrive and dementia with behavioral disturbance





1. Acute encephalopathy/ dementia with behavioral disturbance. Doubt metabolic, 

based on reviewing his labs. His renal function is at baseline.


-Will need psychiatry consult with Dr. Meyers 


-PT, patient seen ambulating independently with walker 


Case management for dc planning 





2. Obstructive uropathy with staghorn calculus.BPH.  Renal function stable. 

Resume Tamsulosin 


3. CKD stage II-III, stable. Repeat labs 9/14 


4. Failure to thrive. 


5. Dementia with behavioral disturbance. Oriented to self only. Resume 

outpatient psych meds. Risperidone 2mg at night per psychiatry and ativan prn 


6.HTN. Amlodipine 10mg, resume home Atenolol


7. Etoh dependence in the past. Thiamine and folate 





has no insurance,  involved. applied for medical 





Time of this note may not reflect time of encounter. 


I spent 40 minutes on this encounter. 50% or more on counselling and care 

coordination.





Subjective


Date patient seen:  Sep 19, 2019


ROS Limited/Unobtainable:  No


Constitutional:  Denies: no symptoms, chills, diaphoresis, fever, malaise, 

weakness, other


HEENT:  Denies: no symptoms, eye pain, blurred vision, tearing, double vision, 

ear pain, ear discharge, nose pain, nose congestion, throat pain, throat 

swelling, mouth pain, mouth swelling, other


Cardiovascular:  Denies: no symptoms, chest pain, edema, irregular heart rate, 

lightheadedness, palpitations, syncope, other


Respiratory:  Denies: no symptoms, cough, orthopnea, shortness of breath, SOB 

with excertion, SOB at rest, sputum, stridor, wheezing, other


Gastrointestinal/Abdominal:  Denies: no symptoms, abdomen distended, abdominal 

pain, black stools, tarry stools, blood in stool, constipated, diarrhea, 

difficulty swallowing, nausea, poor appetite, poor fluid intake, rectal bleeding

, vomiting, other


Neurologic/Psychiatric:  Denies: no symptoms, anxiety, depressed, emotional 

problems, headache, numbness, paresthesia, pre-existing deficit, seizure, 

tingling, tremors, weakness, other


Endocrine:  Denies: no symptoms, excessive sweating, flushing, intolerance to 

cold, intolerance to heat, increased hunger, increased thirst, increased urine, 

unexplained weight gain, unexplained weight loss, other


Hematologic/Lymphatic:  Denies: no symptoms, anemia, easy bleeding, easy 

bruising, other


Allergies:  


Coded Allergies:  


     No Known Allergies (Unverified , 7/23/19)


Subjective


Seen and examined. No acute events. He is calm and cooperative





Objective





Last 24 Hour Vital Signs








  Date Time  Temp Pulse Resp B/P (MAP) Pulse Ox O2 Delivery O2 Flow Rate FiO2


 


9/19/19 08:07  63  120/64    


 


9/19/19 08:07  63  120/64    


 


9/19/19 08:00 97.4 64 18 138/66 (90) 98   


 


9/19/19 08:00      Room Air  


 


9/19/19 04:00 98.1 63 20 120/64 (82) 96   


 


9/19/19 00:00 98.0 62 18 121/64 (83) 96   


 


9/18/19 21:00      Room Air  


 


9/18/19 20:00 98.0 54 20 139/68 (91) 96   


 


9/18/19 16:00 98.2 60 20 120/75 (90) 98   


 


9/18/19 12:00 98.0 58 18 115/70 (85) 97   

















Intake and Output  


 


 9/18/19 9/19/19





 19:00 07:00


 


Intake Total 300 ml 


 


Balance 300 ml 


 


  


 


Intake Oral 300 ml 


 


# Voids 3 2


 


# Bowel Movements 1 








Height (Feet):  5


Height (Inches):  10.00


Weight (Pounds):  186


Objective


General Appearance:  alert, no distress 


Head:  normocephalic, atraumatic


Eyes:  bilateral eye PERRL, bilateral eye EOMI


ENT:  uvula midline, moist mucus membranes


Neck:  supple, no jvd 


Respiratory:  lungs clear, no respiratory distress, no retraction, no accessory 

muscle use


Cardiovascular :  regular rate, rhythm, no edema, no gallop, no murmur


Gastrointestinal:  non tender, soft, no guarding, no rebound


Musculoskeletal:  moves all extremities 


Neurologic:  alert, oriented to self only


Psychiatric:  mood/affect flat. annoyed 


Skin:  no ulcers, tattoos











Zack Nichols M.D. Sep 19, 2019 09:37

## 2019-09-19 NOTE — NUR
CASE MANAGEMENT:REVIEW



9/19/19

SI:FTT. GRAVELY DISABLED

99.1  56  18  116/67  96% ON RA



IS: NORVASC PO QD

LEXAPRO PO QD

RISPERDAL PO QHS

FLOMAX PO BID

ATIVAN PO Q6HRS PRN

IVF@75/HR

HEPARIN SQ Q12

**: MED/SURG STATUS 4 EAST





PLAN:

SNF PLACEMENT ONCE INSURANCE HAS BEEN REINSTATED

## 2019-09-19 NOTE — NUR
HAND-OFF: 

Report given to MARYANN GUALLPA

Patient resting comfortably, no sign of distress, patient from injury



SALONI Tinajero.

## 2019-09-19 NOTE — NUR
HAND-OFF: 

Report given to MARYANN GUALLPA

Patient resting comfortably, no sign of distress, IVF patent and infusing well



SALONI Tinajero.

-------------------------------------------------------------------------------

Addendum: 09/19/19 at 1913 by DEAN BOLTON RN RN

-------------------------------------------------------------------------------

wrong patient

## 2019-09-19 NOTE — NUR
NURSE NOTES:

Patient was ambulating in the room with walker, asking for some snacks, will provide per 
diet. Call light in reach. Bed in lowest position and lock engaged. Will continue to 
monitor.

## 2019-09-19 NOTE — NUR
NURSE NOTES:

received patient awake in bed, nosign of distress, No IV access noted , per NOC shift , no 
acute respiratory distress. Instructed to use the call light for assistance. on fall 
precaution , Call light in reach. Bed in lowest and lock engaged. Will continue to monitor



reji barnes

## 2019-09-19 NOTE — NUR
NURSE NOTES:

Received report form SALONI Gomes. Patient sleeping. On room air, no signs of distress or 
labored breathing. No IV access, MD aware. Bed in lowest position with call light in reach. 
Will continue to monitor.

## 2019-09-20 VITALS — SYSTOLIC BLOOD PRESSURE: 131 MMHG | DIASTOLIC BLOOD PRESSURE: 78 MMHG

## 2019-09-20 VITALS — SYSTOLIC BLOOD PRESSURE: 132 MMHG | DIASTOLIC BLOOD PRESSURE: 65 MMHG

## 2019-09-20 VITALS — DIASTOLIC BLOOD PRESSURE: 84 MMHG | SYSTOLIC BLOOD PRESSURE: 132 MMHG

## 2019-09-20 VITALS — SYSTOLIC BLOOD PRESSURE: 116 MMHG | DIASTOLIC BLOOD PRESSURE: 58 MMHG

## 2019-09-20 VITALS — DIASTOLIC BLOOD PRESSURE: 70 MMHG | SYSTOLIC BLOOD PRESSURE: 130 MMHG

## 2019-09-20 VITALS — DIASTOLIC BLOOD PRESSURE: 63 MMHG | SYSTOLIC BLOOD PRESSURE: 131 MMHG

## 2019-09-20 RX ADMIN — ATENOLOL SCH MG: 25 TABLET ORAL at 08:57

## 2019-09-20 RX ADMIN — DOCUSATE SODIUM SCH MG: 100 CAPSULE, LIQUID FILLED ORAL at 20:08

## 2019-09-20 RX ADMIN — Medication SCH MG: at 08:56

## 2019-09-20 RX ADMIN — HEPARIN SODIUM SCH UNITS: 5000 INJECTION INTRAVENOUS; SUBCUTANEOUS at 09:01

## 2019-09-20 RX ADMIN — TAMSULOSIN HYDROCHLORIDE SCH MG: 0.4 CAPSULE ORAL at 08:56

## 2019-09-20 RX ADMIN — HEPARIN SODIUM SCH UNITS: 5000 INJECTION INTRAVENOUS; SUBCUTANEOUS at 20:02

## 2019-09-20 RX ADMIN — DOCUSATE SODIUM SCH MG: 100 CAPSULE, LIQUID FILLED ORAL at 08:57

## 2019-09-20 RX ADMIN — TAMSULOSIN HYDROCHLORIDE SCH MG: 0.4 CAPSULE ORAL at 17:53

## 2019-09-20 NOTE — NUR
NURSE NOTES:

received report from SALONI Davies. patient in bed. sleeping. no respiratory distress noted. 
no facial grimacing noted. no IV. md is aware. contact isolation. PPE at all times. bed in 
the lowest position and locked. call light within reach. will continue to provide plan of 
care.

## 2019-09-20 NOTE — GENERAL PROGRESS NOTE
Assessment/Plan


Problem List:  


(1) Acute encephalopathy


ICD Codes:  G93.40 - Encephalopathy, unspecified


SNOMED:  17855971, 948035438


(2) Failure to thrive


SNOMED:  99571966


Qualifiers:  


   Qualified Codes:  R62.7 - Adult failure to thrive


(3) Staghorn calculus


ICD Codes:  N20.0 - Calculus of kidney


SNOMED:  329774826


(4) Hydronephrosis


ICD Codes:  N13.30 - Unspecified hydronephrosis


SNOMED:  96030390


(5) Encounter for generalized patient complaints


ICD Codes:  Z00.8 - Encounter for other general examination


SNOMED:  705714891


(6) Dementia with behavioral disturbance


ICD Codes:  F03.91 - Unspecified dementia with behavioral disturbance


SNOMED:  4866457195107


Status:  stable


Assessment/Plan:


64 year old male admitted from facility for acute encephalopathy, Failure to 

thrive and dementia with behavioral disturbance





1. Acute encephalopathy/ dementia with behavioral disturbance. Doubt metabolic, 

based on reviewing his labs. His renal function is at baseline.


-Will need psychiatry consult with Dr. Meyers 


-PT, patient seen ambulating independently with walker 


Case management for dc planning 





2. Obstructive uropathy with staghorn calculus.BPH.  Renal function stable. 

Resume Tamsulosin 


3. CKD stage II-III, stable. Repeat labs 9/14 


4. Failure to thrive. 


5. Dementia with behavioral disturbance. Oriented to self only. Resume 

outpatient psych meds. Risperidone 2mg at night per psychiatry and ativan prn 


6.HTN. Amlodipine 10mg, resume home Atenolol


7. Etoh dependence in the past. Thiamine and folate 





has no insurance,  involved. applied for medical 





Time of this note may not reflect time of encounter. 


I spent 40 minutes on this encounter. 50% or more on counselling and care 

coordination.





Subjective


Date patient seen:  Sep 20, 2019


ROS Limited/Unobtainable:  No


Constitutional:  Denies: no symptoms, chills, diaphoresis, fever, malaise, 

weakness, other


HEENT:  Denies: no symptoms, eye pain, blurred vision, tearing, double vision, 

ear pain, ear discharge, nose pain, nose congestion, throat pain, throat 

swelling, mouth pain, mouth swelling, other


Cardiovascular:  Denies: no symptoms, chest pain, edema, irregular heart rate, 

lightheadedness, palpitations, syncope, other


Respiratory:  Denies: no symptoms, cough, orthopnea, shortness of breath, SOB 

with excertion, SOB at rest, sputum, stridor, wheezing, other


Gastrointestinal/Abdominal:  Denies: no symptoms, abdomen distended, abdominal 

pain, black stools, tarry stools, blood in stool, constipated, diarrhea, 

difficulty swallowing, nausea, poor appetite, poor fluid intake, rectal bleeding

, vomiting, other


Genitourinary:  Denies: no symptoms, burning, discharge, frequency, flank pain, 

hematuria, incontinence, pain, urgency, other


Neurologic/Psychiatric:  Denies: no symptoms, anxiety, depressed, emotional 

problems, headache, numbness, paresthesia, pre-existing deficit, seizure, 

tingling, tremors, weakness, other


Endocrine:  Denies: no symptoms, excessive sweating, flushing, intolerance to 

cold, intolerance to heat, increased hunger, increased thirst, increased urine, 

unexplained weight gain, unexplained weight loss, other


Hematologic/Lymphatic:  Denies: no symptoms, anemia, easy bleeding, easy 

bruising, other


Allergies:  


Coded Allergies:  


     No Known Allergies (Unverified , 7/23/19)


Subjective


Seen and examined. No acute events. He is calm and cooperative





Objective





Last 24 Hour Vital Signs








  Date Time  Temp Pulse Resp B/P (MAP) Pulse Ox O2 Delivery O2 Flow Rate FiO2


 


9/20/19 09:00      Room Air  


 


9/20/19 08:57  54  131/63    


 


9/20/19 08:57  54  131/63    


 


9/20/19 08:00 98.0 54 18 131/63 (85) 97   


 


9/20/19 04:00 97.6 51 20 132/84 (100) 96   


 


9/20/19 00:00 98.2 58 18 132/65 (87) 100   


 


9/19/19 21:00      Room Air  


 


9/19/19 20:00 99.2 55 20 127/65 (85) 97   


 


9/19/19 16:00 99.2 58 18 127/68 (87) 96   


 


9/19/19 11:56 99.1 56 18 116/67 (83) 96   

















Intake and Output  


 


 9/19/19 9/20/19





 19:00 07:00


 


Intake Total 940 ml 


 


Balance 940 ml 


 


  


 


Intake Oral 940 ml 


 


# Voids 3 3








Height (Feet):  5


Height (Inches):  10.00


Weight (Pounds):  186


Objective


General Appearance:  alert, no distress 


Head:  normocephalic, atraumatic


Eyes:  bilateral eye PERRL, bilateral eye EOMI


ENT:  uvula midline, moist mucus membranes


Neck:  supple, no jvd 


Respiratory:  lungs clear, no respiratory distress, no retraction, no accessory 

muscle use


Cardiovascular :  regular rate, rhythm, no edema, no gallop, no murmur


Gastrointestinal:  non tender, soft, no guarding, no rebound


Musculoskeletal:  moves all extremities 


Neurologic:  alert, oriented to self only


Psychiatric:  mood/affect flat. annoyed 


Skin:  no ulcers, tattoos











Zack Nichols M.D. Sep 20, 2019 11:03

## 2019-09-20 NOTE — NUR
PT EVALUATION NOTE

Patient seen for initial evaluation. Patient is at independent/supervised level with bed 
mobility, transfers and ambulation with FWW. Skilled inpatient PT intervention not warranted 
as patient is at independent/supervised level, cleared to ambulate with nursing supervision. 
Patient discharged from PT, Zain UGALLPA notified. 










-------------------------------------------------------------------------------

Addendum: 09/20/19 at 1235 by PARAS ENCINAS PT

-------------------------------------------------------------------------------

Amended: Links added.

## 2019-09-20 NOTE — NUR
CASE MANAGEMENT:REVIEW



9/20/19

SI:FTT. GRAVELY DISABLED

98.0   54  18  131/63  97% ON RA



IS: NORVASC PO QD

LEXAPRO PO QD

RISPERDAL PO QHS

FLOMAX PO BID

ATIVAN PO Q6HRS PRN

IVF@75/HR

HEPARIN SQ Q12

**: MED/SURG STATUS 4 EAST





PLAN:

SNF PLACEMENT ONCE INSURANCE HAS BEEN REINSTATED

UNABLE TO CARE FOR HIS SELF

## 2019-09-20 NOTE — NUR
NURSE NOTES:

Received pt resting in bed. AAO x 2. On room air. No IV access. MD aware. No c/o pain, no 
acute distress noted at this time. Encouraged pt to stay in the bed. Walker at bedside. Bed 
locked, lowest position, alarm on, side rails up x 2, call light within reach. Will continue 
to monitor.

## 2019-09-20 NOTE — NUR
*-* INSURANCE *-*



ALL CLINICALS AND REVIEWS HAVE BEEN FAXED TO:



[facility]

TRACKING [id number]

FAX ALL CLINICALS  [phone]

## 2019-09-21 VITALS — SYSTOLIC BLOOD PRESSURE: 142 MMHG | DIASTOLIC BLOOD PRESSURE: 74 MMHG

## 2019-09-21 VITALS — SYSTOLIC BLOOD PRESSURE: 139 MMHG | DIASTOLIC BLOOD PRESSURE: 85 MMHG

## 2019-09-21 VITALS — DIASTOLIC BLOOD PRESSURE: 71 MMHG | SYSTOLIC BLOOD PRESSURE: 125 MMHG

## 2019-09-21 VITALS — DIASTOLIC BLOOD PRESSURE: 72 MMHG | SYSTOLIC BLOOD PRESSURE: 137 MMHG

## 2019-09-21 VITALS — SYSTOLIC BLOOD PRESSURE: 122 MMHG | DIASTOLIC BLOOD PRESSURE: 66 MMHG

## 2019-09-21 RX ADMIN — TAMSULOSIN HYDROCHLORIDE SCH MG: 0.4 CAPSULE ORAL at 17:18

## 2019-09-21 RX ADMIN — TAMSULOSIN HYDROCHLORIDE SCH MG: 0.4 CAPSULE ORAL at 09:01

## 2019-09-21 RX ADMIN — HEPARIN SODIUM SCH UNITS: 5000 INJECTION INTRAVENOUS; SUBCUTANEOUS at 20:22

## 2019-09-21 RX ADMIN — Medication SCH MG: at 09:01

## 2019-09-21 RX ADMIN — DOCUSATE SODIUM SCH MG: 100 CAPSULE, LIQUID FILLED ORAL at 20:20

## 2019-09-21 RX ADMIN — DOCUSATE SODIUM SCH MG: 100 CAPSULE, LIQUID FILLED ORAL at 09:00

## 2019-09-21 RX ADMIN — ATENOLOL SCH MG: 25 TABLET ORAL at 09:01

## 2019-09-21 RX ADMIN — HEPARIN SODIUM SCH UNITS: 5000 INJECTION INTRAVENOUS; SUBCUTANEOUS at 09:03

## 2019-09-21 NOTE — NUR
NURSE NOTES:

Received pt sleeping in bed. On room air. No IV access. MD aware. No c/o pain, no acute 
distress noted at this time. Walker at bedside. Fall precaution maintained. Bed locked, 
lowest position, alarm on, side rails up x 2, call light within reach. Will continue to 
monitor.

## 2019-09-21 NOTE — NUR
NURSE NOTES:

received report from SALONI Ocasio. patient in bed. sleeping. no respiratory distress noted. no 
facial grimacing noted. no IV. MD is aware. bed in the lowest position and locked. yellow 
socks for fall risk. walker at the bed side. alarm on. call light within reach. will 
continue to provide plan of care.

## 2019-09-22 VITALS — DIASTOLIC BLOOD PRESSURE: 66 MMHG | SYSTOLIC BLOOD PRESSURE: 137 MMHG

## 2019-09-22 VITALS — DIASTOLIC BLOOD PRESSURE: 60 MMHG | SYSTOLIC BLOOD PRESSURE: 116 MMHG

## 2019-09-22 VITALS — DIASTOLIC BLOOD PRESSURE: 51 MMHG | SYSTOLIC BLOOD PRESSURE: 98 MMHG

## 2019-09-22 VITALS — DIASTOLIC BLOOD PRESSURE: 79 MMHG | SYSTOLIC BLOOD PRESSURE: 152 MMHG

## 2019-09-22 VITALS — SYSTOLIC BLOOD PRESSURE: 124 MMHG | DIASTOLIC BLOOD PRESSURE: 66 MMHG

## 2019-09-22 VITALS — SYSTOLIC BLOOD PRESSURE: 121 MMHG | DIASTOLIC BLOOD PRESSURE: 66 MMHG

## 2019-09-22 RX ADMIN — DOCUSATE SODIUM SCH MG: 100 CAPSULE, LIQUID FILLED ORAL at 20:16

## 2019-09-22 RX ADMIN — HEPARIN SODIUM SCH UNITS: 5000 INJECTION INTRAVENOUS; SUBCUTANEOUS at 20:19

## 2019-09-22 RX ADMIN — ATENOLOL SCH MG: 25 TABLET ORAL at 09:12

## 2019-09-22 RX ADMIN — HEPARIN SODIUM SCH UNITS: 5000 INJECTION INTRAVENOUS; SUBCUTANEOUS at 09:16

## 2019-09-22 RX ADMIN — Medication SCH MG: at 09:11

## 2019-09-22 RX ADMIN — TAMSULOSIN HYDROCHLORIDE SCH MG: 0.4 CAPSULE ORAL at 17:42

## 2019-09-22 RX ADMIN — TAMSULOSIN HYDROCHLORIDE SCH MG: 0.4 CAPSULE ORAL at 09:14

## 2019-09-22 RX ADMIN — DOCUSATE SODIUM SCH MG: 100 CAPSULE, LIQUID FILLED ORAL at 09:13

## 2019-09-22 NOTE — NUR
NURSE NOTES:

Patient in bed, awake, alert, with periods of confusion. Able to make needs known. 
Respiration is even and unlabored. No complaint of pain or discomfort noted. Skin is warm 
and dry to touch. No iv site noted. Abdomen is soft and non distended. Bed in low and locked 
position. Provided safe environment. Call light is at bedside. Will continue plan of care.

## 2019-09-22 NOTE — GENERAL PROGRESS NOTE
Assessment/Plan


Problem List:  


(1) Failure to thrive


SNOMED:  06616279


Qualifiers:  


   Qualified Codes:  R62.7 - Adult failure to thrive


(2) Gravely disabled


SNOMED:  584066874


(3) Acute encephalopathy


ICD Codes:  G93.40 - Encephalopathy, unspecified


SNOMED:  87675695, 437410791


(4) Encounter for generalized patient complaints


ICD Codes:  Z00.8 - Encounter for other general examination


SNOMED:  367504263


(5) Dementia with behavioral disturbance


ICD Codes:  F03.91 - Unspecified dementia with behavioral disturbance


SNOMED:  6657944766510


Status:  stable


Assessment/Plan:


64 year old male admitted from facility for acute encephalopathy, Failure to 

thrive and dementia with behavioral disturbance





1. Acute encephalopathy/ dementia with behavioral disturbance. Doubt metabolic, 

based on reviewing his labs. His renal function is at baseline.


-Will need psychiatry consult with Dr. Meyers 


-PT, patient seen ambulating independently with walker 


Case management for dc planning 





2. Obstructive uropathy with staghorn calculus.BPH.  Renal function stable. 

Resume Tamsulosin 


3. CKD stage II-III, stable. Repeat labs 9/14 


4. Failure to thrive. 


5. Dementia with behavioral disturbance. Oriented to self only. Resume 

outpatient psych meds. Risperidone 2mg at night per psychiatry and ativan prn 


6.HTN. Amlodipine 10mg, resume home Atenolol


7. Etoh dependence in the past. Thiamine and folate 





has no insurance,  involved. applied for medical 





Time of this note may not reflect time of encounter. 


I spent 27 minutes on this encounter. 50% or more on counselling and care 

coordination.





Subjective


Date patient seen:  Sep 21, 2019


Time patient seen:  11:45


ROS Limited/Unobtainable:  Yes


Allergies:  


Coded Allergies:  


     No Known Allergies (Unverified , 7/23/19)


Subjective


No acute events overnight. No complaints. Patient unable to provide further 

history. Still awaiting placement.





Objective





Last 24 Hour Vital Signs








  Date Time  Temp Pulse Resp B/P (MAP) Pulse Ox O2 Delivery O2 Flow Rate FiO2


 


9/21/19 21:00      Room Air  


 


9/21/19 20:00 97.8 56 18 122/66 (84) 98   


 


9/21/19 16:00 98.8 53 17 125/71 (89) 97   


 


9/21/19 09:01  63  139/85    


 


9/21/19 09:00  63  139/85    


 


9/21/19 09:00      Room Air  


 


9/21/19 08:00 97.7 63 19 139/85 (103) 97   


 


9/21/19 04:00 97.0 62 19 137/72 (93) 95   


 


9/21/19 00:00 97.9 60 19 142/74 (96) 98   

















Intake and Output  


 


 9/20/19 9/21/19





 19:00 07:00


 


Intake Total 720 ml 


 


Balance 720 ml 


 


  


 


Intake Oral 720 ml 


 


# Voids 4 3


 


# Bowel Movements 2 








Height (Feet):  5


Height (Inches):  10.00


Weight (Pounds):  186


General Appearance:  WD/WN, no apparent distress, alert


EENT:  PERRL/EOMI


Neck:  non-tender, normal alignment


Cardiovascular:  normal peripheral pulses, normal rate, regular rhythm, 

regularly irregular


Respiratory/Chest:  chest wall non-tender, lungs clear, normal breath sounds


Abdomen:  normal bowel sounds, non tender, soft


Extremities:  normal range of motion, non-tender, normal inspection, no calf 

tenderness


Edema:  other - no edema LE bilaterally


Neurologic:  CNs II-XII grossly normal, alert, oriented x 3, responsive


Skin:  normal pigmentation, warm/dry











Melvin De La Cruz D.O. Sep 22, 2019 00:03

## 2019-09-22 NOTE — GENERAL PROGRESS NOTE
Assessment/Plan


Problem List:  


(1) Failure to thrive


SNOMED:  89506509


Qualifiers:  


   Qualified Codes:  R62.7 - Adult failure to thrive


(2) Gravely disabled


SNOMED:  137868986


(3) Acute encephalopathy


ICD Codes:  G93.40 - Encephalopathy, unspecified


SNOMED:  14902772, 685677078


(4) Encounter for generalized patient complaints


ICD Codes:  Z00.8 - Encounter for other general examination


SNOMED:  397443000


(5) Dementia with behavioral disturbance


ICD Codes:  F03.91 - Unspecified dementia with behavioral disturbance


SNOMED:  5528062963021


Status:  stable


Assessment/Plan:


64 year old male admitted from facility for acute encephalopathy, Failure to 

thrive and dementia with behavioral disturbance





1. Acute encephalopathy/ dementia with behavioral disturbance. Doubt metabolic, 

based on reviewing his labs. His renal function is at baseline.


-Will need psychiatry consult with Dr. Meyers 


-PT, patient seen ambulating independently with walker 


Case management for dc planning 





2. Obstructive uropathy with staghorn calculus.BPH.  Renal function stable. 

Resume Tamsulosin 


3. CKD stage II-III, stable. 


4. Failure to thrive. 


5. Dementia with behavioral disturbance. Resume outpatient psych meds. 

Risperidone 2mg at night per psychiatry and ativan prn 


6.HTN. Amlodipine 10mg, Will D/C atenolol given bradycardia and fatigue. 

patient also at risk of falling due to syncope from bradycardia. Risks outweigh 

benefits. Will add other agent if needed. 


7. Etoh dependence in the past. Thiamine and folate 





FENPPx


DVT: heparin SBQ, will check CBC





has no insurance,  involved. applied for medical 





Time of this note may not reflect time of encounter. 


I spent 37 minutes on this encounter. 50% or more on counselling and care 

coordination.





Subjective


Date patient seen:  Sep 22, 2019


Constitutional:  Denies: chills, diaphoresis, fever, malaise, weakness, other


HEENT:  Denies: eye pain, blurred vision, tearing, double vision, ear pain, ear 

discharge, nose pain, nose congestion, throat pain, throat swelling, mouth pain

, mouth swelling, other


Cardiovascular:  Denies: no symptoms, chest pain, edema, irregular heart rate, 

lightheadedness, palpitations, syncope, other


Respiratory:  Reports: no symptoms; Denies: cough, orthopnea, shortness of 

breath, SOB with excertion, SOB at rest, sputum, stridor, wheezing, other


Gastrointestinal/Abdominal:  Reports: no symptoms; Denies: abdomen distended, 

abdominal pain, black stools, tarry stools, blood in stool, constipated, 

diarrhea, difficulty swallowing, nausea, poor appetite, poor fluid intake, 

rectal bleeding, vomiting, other


Genitourinary:  Denies: burning, discharge, frequency, flank pain, hematuria, 

incontinence, pain, urgency, other


Neurologic/Psychiatric:  Denies: anxiety, depressed, emotional problems, 

headache, numbness, paresthesia, pre-existing deficit, seizure, tingling, 

tremors, weakness, other


Endocrine:  Denies: excessive sweating, flushing, intolerance to cold, 

intolerance to heat, increased hunger, increased thirst, increased urine, 

unexplained weight gain, unexplained weight loss, other


Hematologic/Lymphatic:  Denies: anemia, easy bleeding, easy bruising, other


Allergies:  


Coded Allergies:  


     No Known Allergies (Unverified , 7/23/19)


Subjective


No acute events overnight. No complaints. Heart rate persistently in low 50s. 

No syncope but feels fatigued. Denies chest pain. Has always been on atenolol 

for blood pressure. Still awaiting placement.





Objective





Last 24 Hour Vital Signs








  Date Time  Temp Pulse Resp B/P (MAP) Pulse Ox O2 Delivery O2 Flow Rate FiO2


 


9/22/19 20:41      Room Air  


 


9/22/19 20:00 97.7 59 18 98/51 (67) 96   


 


9/22/19 16:00 98.3 56 19 152/79 (103) 97   


 


9/22/19 12:00 96.8 54 19 137/66 (89) 97   


 


9/22/19 09:13  60  124/66    


 


9/22/19 09:12  60  124/66    


 


9/22/19 09:00      Room Air  


 


9/22/19 08:00 97.9 60 18 124/66 (85) 96   


 


9/22/19 04:00 97.2 52 17 116/60 (78) 96   


 


9/22/19 00:00 98.6 50 18 121/66 (84) 95   

















Intake and Output  


 


 9/21/19 9/22/19





 19:00 07:00


 


Intake Total 720 ml 


 


Balance 720 ml 


 


  


 


Intake Oral 720 ml 


 


# Voids 6 3








Height (Feet):  5


Height (Inches):  10.00


Weight (Pounds):  186


General Appearance:  WD/WN, no apparent distress, alert


EENT:  PERRL/EOMI


Neck:  non-tender, normal alignment, supple


Cardiovascular:  normal peripheral pulses, normal rate, regular rhythm, no JVD


Respiratory/Chest:  chest wall non-tender, lungs clear, normal breath sounds, 

no respiratory distress


Abdomen:  normal bowel sounds, non tender, soft


Extremities:  normal range of motion, non-tender, normal inspection


Edema:  other - no edema LE bilaterally


Neurologic:  CNs II-XII grossly normal, no motor/sensory deficits, alert, 

oriented x 3, responsive











Melvin De La Cruz D.O. Sep 22, 2019 21:27

## 2019-09-22 NOTE — NUR
NURSE NOTES:

Received patient on bed, alert, awake and verbally responsive. No SOB or cardiac distress. 
Kept call light within reach. Head of bed elevated. Kept bed locked. Will continue plan of 
care.

## 2019-09-23 VITALS — DIASTOLIC BLOOD PRESSURE: 67 MMHG | SYSTOLIC BLOOD PRESSURE: 131 MMHG

## 2019-09-23 VITALS — DIASTOLIC BLOOD PRESSURE: 64 MMHG | SYSTOLIC BLOOD PRESSURE: 128 MMHG

## 2019-09-23 VITALS — SYSTOLIC BLOOD PRESSURE: 98 MMHG | DIASTOLIC BLOOD PRESSURE: 60 MMHG

## 2019-09-23 VITALS — DIASTOLIC BLOOD PRESSURE: 73 MMHG | SYSTOLIC BLOOD PRESSURE: 126 MMHG

## 2019-09-23 VITALS — DIASTOLIC BLOOD PRESSURE: 77 MMHG | SYSTOLIC BLOOD PRESSURE: 126 MMHG

## 2019-09-23 VITALS — SYSTOLIC BLOOD PRESSURE: 127 MMHG | DIASTOLIC BLOOD PRESSURE: 69 MMHG

## 2019-09-23 LAB
ADD MANUAL DIFF: NO
BASOPHILS NFR BLD AUTO: 1.5 % (ref 0–2)
EOSINOPHIL NFR BLD AUTO: 5.7 % (ref 0–3)
ERYTHROCYTE [DISTWIDTH] IN BLOOD BY AUTOMATED COUNT: 14.2 % (ref 11.6–14.8)
HCT VFR BLD CALC: 40.9 % (ref 42–52)
HGB BLD-MCNC: 13.4 G/DL (ref 14.2–18)
LYMPHOCYTES NFR BLD AUTO: 31.9 % (ref 20–45)
MCV RBC AUTO: 90 FL (ref 80–99)
MONOCYTES NFR BLD AUTO: 12.2 % (ref 1–10)
NEUTROPHILS NFR BLD AUTO: 48.7 % (ref 45–75)
PLATELET # BLD: 149 K/UL (ref 150–450)
RBC # BLD AUTO: 4.54 M/UL (ref 4.7–6.1)
WBC # BLD AUTO: 5.6 K/UL (ref 4.8–10.8)

## 2019-09-23 RX ADMIN — TAMSULOSIN HYDROCHLORIDE SCH MG: 0.4 CAPSULE ORAL at 08:26

## 2019-09-23 RX ADMIN — Medication SCH MG: at 08:26

## 2019-09-23 RX ADMIN — HEPARIN SODIUM SCH UNITS: 5000 INJECTION INTRAVENOUS; SUBCUTANEOUS at 08:28

## 2019-09-23 RX ADMIN — HEPARIN SODIUM SCH UNITS: 5000 INJECTION INTRAVENOUS; SUBCUTANEOUS at 20:41

## 2019-09-23 RX ADMIN — DOCUSATE SODIUM SCH MG: 100 CAPSULE, LIQUID FILLED ORAL at 08:27

## 2019-09-23 RX ADMIN — DOCUSATE SODIUM SCH MG: 100 CAPSULE, LIQUID FILLED ORAL at 20:39

## 2019-09-23 RX ADMIN — TAMSULOSIN HYDROCHLORIDE SCH MG: 0.4 CAPSULE ORAL at 17:07

## 2019-09-23 NOTE — NUR
NURSE NOTES:

received patient sleeping in bed, no sign of distress,  No IV access noted ,MD aware  ,on 
fall precaution, Instructed to use the call light for assistance.Bed in lowest position. 
Will continue to monitor



reji barnes

## 2019-09-23 NOTE — GENERAL PROGRESS NOTE
Assessment/Plan


Problem List:  


(1) Acute encephalopathy


ICD Codes:  G93.40 - Encephalopathy, unspecified


SNOMED:  42884114, 253432109


(2) Failure to thrive


SNOMED:  85887489


Qualifiers:  


   Qualified Codes:  R62.7 - Adult failure to thrive


(3) Staghorn calculus


ICD Codes:  N20.0 - Calculus of kidney


SNOMED:  575327211


(4) Hydronephrosis


ICD Codes:  N13.30 - Unspecified hydronephrosis


SNOMED:  73862494


(5) Encounter for generalized patient complaints


ICD Codes:  Z00.8 - Encounter for other general examination


SNOMED:  553742346


(6) Dementia with behavioral disturbance


ICD Codes:  F03.91 - Unspecified dementia with behavioral disturbance


SNOMED:  5889984585300


Status:  stable


Assessment/Plan:


64 year old male admitted from facility for acute encephalopathy, Failure to 

thrive and dementia with behavioral disturbance





1. Acute encephalopathy/ dementia with behavioral disturbance. Doubt metabolic, 

based on reviewing his labs. His renal function is at baseline.


-Will need psychiatry consult with Dr. Meyers 


-PT, patient seen ambulating independently with walker 


Case management for dc planning 





2. Obstructive uropathy with staghorn calculus.BPH.  Renal function stable. 

Resume Tamsulosin 


3. CKD stage II-III, stable. Repeat labs 9/14 


4. Failure to thrive. 


5. Dementia with behavioral disturbance. Oriented to self only. Resume 

outpatient psych meds. Risperidone 2mg at night per psychiatry and ativan prn 


6.HTN. Amlodipine 10mg, Atenolol stopped due to bradycardia 


7. Etoh dependence in the past. Thiamine and folate 





has no insurance,  involved. applied for medical 





Time of this note may not reflect time of encounter. 


I spent 40 minutes on this encounter. 50% or more on counselling and care 

coordination.





Subjective


Date patient seen:  Sep 23, 2019


ROS Limited/Unobtainable:  No


HEENT:  Denies: no symptoms, eye pain, blurred vision, tearing, double vision, 

ear pain, ear discharge, nose pain, nose congestion, throat pain, throat 

swelling, mouth pain, mouth swelling, other


Cardiovascular:  Denies: no symptoms, chest pain, edema, irregular heart rate, 

lightheadedness, palpitations, syncope, other


Respiratory:  Denies: no symptoms, cough, orthopnea, shortness of breath, SOB 

with excertion, SOB at rest, sputum, stridor, wheezing, other


Gastrointestinal/Abdominal:  Denies: no symptoms, abdomen distended, abdominal 

pain, black stools, tarry stools, blood in stool, constipated, diarrhea, 

difficulty swallowing, nausea, poor appetite, poor fluid intake, rectal bleeding

, vomiting, other


Genitourinary:  Denies: no symptoms, burning, discharge, frequency, flank pain, 

hematuria, incontinence, pain, urgency, other


Neurologic/Psychiatric:  Denies: no symptoms, anxiety, depressed, emotional 

problems, headache, numbness, paresthesia, pre-existing deficit, seizure, 

tingling, tremors, weakness, other


Endocrine:  Denies: no symptoms, excessive sweating, flushing, intolerance to 

cold, intolerance to heat, increased hunger, increased thirst, increased urine, 

unexplained weight gain, unexplained weight loss, other


Hematologic/Lymphatic:  Denies: no symptoms, anemia, easy bleeding, easy 

bruising, other


Allergies:  


Coded Allergies:  


     No Known Allergies (Unverified , 7/23/19)


Subjective


Seen and examined. No acute events. He is calm and cooperative





Objective





Last 24 Hour Vital Signs








  Date Time  Temp Pulse Resp B/P (MAP) Pulse Ox O2 Delivery O2 Flow Rate FiO2


 


9/23/19 08:27  52  98/60    


 


9/23/19 08:00      Room Air  


 


9/23/19 08:00 97.7 83 18 131/67 (88) 97   


 


9/23/19 04:00 97.7 52 18 98/60 (73) 96   


 


9/23/19 00:00 97.0 59 18 128/64 (85) 95   


 


9/22/19 20:41      Room Air  


 


9/22/19 20:00 97.7 59 18 98/51 (67) 96   


 


9/22/19 16:00 98.3 56 19 152/79 (103) 97   


 


9/22/19 12:00 96.8 54 19 137/66 (89) 97   

















Intake and Output  


 


 9/22/19 9/23/19





 19:00 07:00


 


Intake Total 720 ml 720 ml


 


Balance 720 ml 720 ml


 


  


 


Intake Oral 720 ml 720 ml


 


# Voids 5 5


 


# Bowel Movements  2








Laboratory Tests


9/23/19 05:30: 


White Blood Count 5.6, Red Blood Count 4.54L, Hemoglobin 13.4L, Hematocrit 40.9L

, Mean Corpuscular Volume 90, Mean Corpuscular Hemoglobin 29.5, Mean 

Corpuscular Hemoglobin Concent 32.7, Red Cell Distribution Width 14.2, Platelet 

Count 149L, Mean Platelet Volume 7.1, Neutrophils (%) (Auto) 48.7, Lymphocytes (

%) (Auto) 31.9, Monocytes (%) (Auto) 12.2H, Eosinophils (%) (Auto) 5.7H, 

Basophils (%) (Auto) 1.5


Height (Feet):  5


Height (Inches):  10.00


Weight (Pounds):  186


Objective


General Appearance:  alert, no distress 


Head:  normocephalic, atraumatic


Eyes:  bilateral eye PERRL, bilateral eye EOMI


ENT:  uvula midline, moist mucus membranes


Neck:  supple, no jvd 


Respiratory:  lungs clear, no respiratory distress, no retraction, no accessory 

muscle use


Cardiovascular :  regular rate, rhythm, no edema, no gallop, no murmur


Gastrointestinal:  non tender, soft, no guarding, no rebound


Musculoskeletal:  moves all extremities 


Neurologic:  alert, oriented to self only


Psychiatric:  mood/affect flat. annoyed 


Skin:  no ulcers, tattoos











Zack Nichols M.D. Sep 23, 2019 11:35

## 2019-09-23 NOTE — NUR
CASE MANAGEMENT:REVIEW



9/23/19

SI:FTT. GRAVELY DISABLED

97.0   59  18  128/64  95% ON RA



IS: NORVASC PO QD

LEXAPRO PO QD

RISPERDAL PO QHS

FLOMAX PO BID

ATIVAN PO Q6HRS PRN

IVF@75/HR

HEPARIN SQ Q12

**: MED/SURG STATUS 4 EAST





PLAN:

SEEKING SNF PLACEMENT

CALLED [facility] ~ UNABLE TO GET THROUGH TO A 

CALLED [facility] ~ THEY SAID IT'S MEDICAL GROUPS RESPONSIBILITY TO FIND OR AUTHORIZE SNF 
PLACEMENT

NEITHER MEDICAL GROUP OR [facility] SEEM TO BE WILLING TO HELP WITH DISCHARGING THIS PATIENT

## 2019-09-23 NOTE — NUR
*-* INSURANCE *-*



UPDATED CLINICALS AND REVIEWS HAVE BEEN FAXED TO:



[facility]

TRACKING [id number]

FAX ALL CLINICALS  [phone]

-------------------------------------------------------------------------------

Addendum: 09/24/19 at 0820 by CLARISSE HANKS LVN LVN

-------------------------------------------------------------------------------

[facility] 

[place] T: [phone]

## 2019-09-23 NOTE — NUR
NURSE NOTES:

Patient in bed, awake, alert, periods of confusion. Able to make needs known. Respiration is 
even and unlabored. No complaint of pain or discomfort noted. Abdomen is soft and non 
distended. Skin is warm and dry to touch. NO iv site noted. Bed in low and locked position. 
Provided safe environment. Kept clean and comfortable. Call light is at bedside. Will 
continue plan of care.

## 2019-09-24 VITALS — SYSTOLIC BLOOD PRESSURE: 127 MMHG | DIASTOLIC BLOOD PRESSURE: 71 MMHG

## 2019-09-24 VITALS — SYSTOLIC BLOOD PRESSURE: 128 MMHG | DIASTOLIC BLOOD PRESSURE: 56 MMHG

## 2019-09-24 VITALS — DIASTOLIC BLOOD PRESSURE: 79 MMHG | SYSTOLIC BLOOD PRESSURE: 139 MMHG

## 2019-09-24 VITALS — DIASTOLIC BLOOD PRESSURE: 79 MMHG | SYSTOLIC BLOOD PRESSURE: 121 MMHG

## 2019-09-24 VITALS — SYSTOLIC BLOOD PRESSURE: 119 MMHG | DIASTOLIC BLOOD PRESSURE: 70 MMHG

## 2019-09-24 RX ADMIN — TAMSULOSIN HYDROCHLORIDE SCH MG: 0.4 CAPSULE ORAL at 08:10

## 2019-09-24 RX ADMIN — DOCUSATE SODIUM SCH MG: 100 CAPSULE, LIQUID FILLED ORAL at 08:11

## 2019-09-24 RX ADMIN — DOCUSATE SODIUM SCH MG: 100 CAPSULE, LIQUID FILLED ORAL at 20:55

## 2019-09-24 RX ADMIN — HEPARIN SODIUM SCH UNITS: 5000 INJECTION INTRAVENOUS; SUBCUTANEOUS at 20:57

## 2019-09-24 RX ADMIN — HEPARIN SODIUM SCH UNITS: 5000 INJECTION INTRAVENOUS; SUBCUTANEOUS at 08:13

## 2019-09-24 RX ADMIN — TAMSULOSIN HYDROCHLORIDE SCH MG: 0.4 CAPSULE ORAL at 17:01

## 2019-09-24 RX ADMIN — Medication SCH MG: at 08:10

## 2019-09-24 NOTE — NUR
CASE MANAGEMENT:REVIEW



9/24/19

SI:FTT. GRAVELY DISABLED

97.1   53  16   127/71  98% ON RA

H/H-13.4/40.9    GLUCOSE+114



IS: NORVASC PO QD

LEXAPRO PO QD

RISPERDAL PO QHS

FLOMAX PO BID

ATIVAN PO Q6HRS PRN

IVF@75/HR

HEPARIN SQ Q12

**: MED/SURG STATUS 4 EAST





PLAN:

SEEKING SNF PLACEMENT

CALLED [facility] ~ UNABLE TO GET THROUGH TO A 

CALLED [facility] ~ THEY SAID IT'S MEDICAL GROUPS RESPONSIBILITY TO FIND OR AUTHORIZE SNF 
PLACEMENT

NEITHER [facility]ICAL [facility] OR [facility] SEEM TO BE WILLING TO HELP WITH DISCHARGING THIS PATIENT

## 2019-09-24 NOTE — GENERAL PROGRESS NOTE
Assessment/Plan


Problem List:  


(1) Acute encephalopathy


ICD Codes:  G93.40 - Encephalopathy, unspecified


SNOMED:  45473119, 587729733


(2) Failure to thrive


SNOMED:  47750524


Qualifiers:  


   Qualified Codes:  R62.7 - Adult failure to thrive


(3) Staghorn calculus


ICD Codes:  N20.0 - Calculus of kidney


SNOMED:  782354313


(4) Hydronephrosis


ICD Codes:  N13.30 - Unspecified hydronephrosis


SNOMED:  55354674


(5) Encounter for generalized patient complaints


ICD Codes:  Z00.8 - Encounter for other general examination


SNOMED:  053541268


(6) Dementia with behavioral disturbance


ICD Codes:  F03.91 - Unspecified dementia with behavioral disturbance


SNOMED:  2214648986394


Status:  stable


Assessment/Plan:


64 year old male admitted from facility for acute encephalopathy, Failure to 

thrive and dementia with behavioral disturbance





1. Acute encephalopathy/ dementia with behavioral disturbance. Doubt metabolic, 

based on reviewing his labs. His renal function is at baseline.


-Will need psychiatry consult with Dr. Meyers 


-PT, patient seen ambulating independently with walker 


Case management for dc planning 





2. Obstructive uropathy with staghorn calculus.BPH.  Renal function stable. 

Resume Tamsulosin 


3. CKD stage II-III, stable. 


4. Failure to thrive. 


5. Dementia with behavioral disturbance. Oriented to self only. Resume 

outpatient psych meds. Risperidone 2mg at night per psychiatry and ativan prn 


6.HTN. Amlodipine 10mg, Atenolol stopped due to bradycardia 


7. Etoh dependence in the past. Thiamine and folate 





has no insurance,  involved. applied for medical 





Time of this note may not reflect time of encounter. 


I spent 15 minutes on this encounter. 50% or more on counselling and care 

coordination.





Subjective


Date patient seen:  Sep 24, 2019


ROS Limited/Unobtainable:  No


Constitutional:  Denies: no symptoms, chills, diaphoresis, fever, malaise, 

weakness, other


HEENT:  Denies: no symptoms, eye pain, blurred vision, tearing, double vision, 

ear pain, ear discharge, nose pain, nose congestion, throat pain, throat 

swelling, mouth pain, mouth swelling, other


Cardiovascular:  Denies: no symptoms, chest pain, edema, irregular heart rate, 

lightheadedness, palpitations, syncope, other


Respiratory:  Denies: no symptoms, cough, orthopnea, shortness of breath, SOB 

with excertion, SOB at rest, sputum, stridor, wheezing, other


Gastrointestinal/Abdominal:  Denies: no symptoms, abdomen distended, abdominal 

pain, black stools, tarry stools, blood in stool, constipated, diarrhea, 

difficulty swallowing, nausea, poor appetite, poor fluid intake, rectal bleeding

, vomiting, other


Genitourinary:  Denies: no symptoms, burning, discharge, frequency, flank pain, 

hematuria, incontinence, pain, urgency, other


Neurologic/Psychiatric:  Denies: no symptoms, anxiety, depressed, emotional 

problems, headache, numbness, paresthesia, pre-existing deficit, seizure, 

tingling, tremors, weakness, other


Endocrine:  Denies: no symptoms, excessive sweating, flushing, intolerance to 

cold, intolerance to heat, increased hunger, increased thirst, increased urine, 

unexplained weight gain, unexplained weight loss, other


Hematologic/Lymphatic:  Denies: no symptoms, anemia, easy bleeding, easy 

bruising, other


Allergies:  


Coded Allergies:  


     No Known Allergies (Unverified , 7/23/19)


Subjective


No acute events. He is calm and cooperative





Objective





Last 24 Hour Vital Signs








  Date Time  Temp Pulse Resp B/P (MAP) Pulse Ox O2 Delivery O2 Flow Rate FiO2


 


9/24/19 12:00 97.6 69 17 119/70 (86) 98   


 


9/24/19 08:10  53  127/71    


 


9/24/19 08:00      Room Air  


 


9/24/19 08:00 97.1 53 16 127/71 (89) 98   


 


9/24/19 04:00 97.1 53 16 127/71 (89) 98   


 


9/24/19 00:00 98.0 57 16 139/79 (99) 97   


 


9/23/19 20:46      Room Air  


 


9/23/19 20:00 97.5 58 18 126/77 (93) 97   


 


9/23/19 16:05 97.8 74 18 126/73 (90) 97   

















Intake and Output  


 


 9/23/19 9/24/19





 18:59 06:59


 


Intake Total 800 ml 350 ml


 


Balance 800 ml 350 ml


 


  


 


Intake Oral 800 ml 350 ml


 


# Voids 3 3








Height (Feet):  5


Height (Inches):  10.00


Weight (Pounds):  186


Objective


General Appearance:  alert, no distress 


Head:  normocephalic, atraumatic


Eyes:  bilateral eye PERRL, bilateral eye EOMI


ENT:  uvula midline, moist mucus membranes


Neck:  supple, no jvd 


Respiratory:  lungs clear, no respiratory distress, no retraction, no accessory 

muscle use


Cardiovascular :  regular rate, rhythm, no edema, no gallop, no murmur


Gastrointestinal:  non tender, soft, no guarding, no rebound


Musculoskeletal:  moves all extremities 


Neurologic:  alert, oriented to self only


Psychiatric:  mood/affect flat. annoyed 


Skin:  no ulcers, tattoos











Zack Nichols M.D. Sep 24, 2019 15:20

## 2019-09-24 NOTE — NUR
HAND-OFF: 

Report given to Ms adelaida RN

patient is stable, SALONI HERRERA RN

.

-------------------------------------------------------------------------------

Addendum: 09/24/19 at 1923 by DEAN BOLTON RN RN

-------------------------------------------------------------------------------

HAND-OFF: 

Report given to Ms adelaida RN

patient is stable



SALONI HERRERA

## 2019-09-24 NOTE — NUR
****DISCHARGE PLANNING



[facility] IS NOT HELPING WITH PLACEMENT

FAXED CLINICALS TO

[facility] T; [phone]

[facility] T: [phone]

[facility] T: [phone]



**ALL OF THE ABOVE SNF'S HAVE CONTRACTS WITH [facility]

**WAITING FOR RESPONSE

-------------------------------------------------------------------------------

Addendum: 09/24/19 at 1328 by CLARISSE HANKS LVN LVN

-------------------------------------------------------------------------------

[facility] IS TRYING TO GET AUTHORIZATION TO ACCEPT THIS PATIENT

## 2019-09-24 NOTE — NUR
NURSE NOTES:

Received pt resting in bed. AAO x 2. On room air. No IV access. MD aware. No c/o pain, no 
acute distress noted at this time. Walker at bedside. Bed locked, lowest position, alarm on, 
side rails up x 2, call light within reach. Will continue to monitor.

## 2019-09-25 VITALS — DIASTOLIC BLOOD PRESSURE: 69 MMHG | SYSTOLIC BLOOD PRESSURE: 121 MMHG

## 2019-09-25 VITALS — DIASTOLIC BLOOD PRESSURE: 67 MMHG | SYSTOLIC BLOOD PRESSURE: 126 MMHG

## 2019-09-25 VITALS — SYSTOLIC BLOOD PRESSURE: 118 MMHG | DIASTOLIC BLOOD PRESSURE: 78 MMHG

## 2019-09-25 VITALS — SYSTOLIC BLOOD PRESSURE: 125 MMHG | DIASTOLIC BLOOD PRESSURE: 70 MMHG

## 2019-09-25 VITALS — DIASTOLIC BLOOD PRESSURE: 69 MMHG | SYSTOLIC BLOOD PRESSURE: 129 MMHG

## 2019-09-25 VITALS — DIASTOLIC BLOOD PRESSURE: 55 MMHG | SYSTOLIC BLOOD PRESSURE: 108 MMHG

## 2019-09-25 RX ADMIN — Medication SCH MG: at 08:59

## 2019-09-25 RX ADMIN — HEPARIN SODIUM SCH UNITS: 5000 INJECTION INTRAVENOUS; SUBCUTANEOUS at 20:35

## 2019-09-25 RX ADMIN — DOCUSATE SODIUM SCH MG: 100 CAPSULE, LIQUID FILLED ORAL at 20:35

## 2019-09-25 RX ADMIN — TAMSULOSIN HYDROCHLORIDE SCH MG: 0.4 CAPSULE ORAL at 16:56

## 2019-09-25 RX ADMIN — DOCUSATE SODIUM SCH MG: 100 CAPSULE, LIQUID FILLED ORAL at 08:59

## 2019-09-25 RX ADMIN — TAMSULOSIN HYDROCHLORIDE SCH MG: 0.4 CAPSULE ORAL at 08:59

## 2019-09-25 RX ADMIN — HEPARIN SODIUM SCH UNITS: 5000 INJECTION INTRAVENOUS; SUBCUTANEOUS at 09:05

## 2019-09-25 NOTE — NUR
*-* INSURANCE *-*



UPDATED CLINICALS AND REVIEWS HAVE BEEN FAXED TO:



[facility]

TRACKING [id number]

CARLOS:LEANN T: 

P:[phone]

F: [phone]

## 2019-09-25 NOTE — GENERAL PROGRESS NOTE
Assessment/Plan


Problem List:  


(1) Acute encephalopathy


ICD Codes:  G93.40 - Encephalopathy, unspecified


SNOMED:  81579370, 244649158


(2) Failure to thrive


SNOMED:  98949764


Qualifiers:  


   Qualified Codes:  R62.7 - Adult failure to thrive


(3) Staghorn calculus


ICD Codes:  N20.0 - Calculus of kidney


SNOMED:  587360460


(4) Hydronephrosis


ICD Codes:  N13.30 - Unspecified hydronephrosis


SNOMED:  52053150


(5) Encounter for generalized patient complaints


ICD Codes:  Z00.8 - Encounter for other general examination


SNOMED:  334123249


(6) Dementia with behavioral disturbance


ICD Codes:  F03.91 - Unspecified dementia with behavioral disturbance


SNOMED:  9944578144397


Status:  stable


Assessment/Plan:


64 year old male admitted from facility for acute encephalopathy, Failure to 

thrive and dementia with behavioral disturbance





1. Acute encephalopathy/ dementia with behavioral disturbance. Doubt metabolic, 

based on reviewing his labs. His renal function is at baseline.


-Will need psychiatry consult with Dr. Meyers 


-PT, patient seen ambulating independently with walker 


Case management for dc planning 





2. Obstructive uropathy with staghorn calculus.BPH.  Renal function stable. 

Resume Tamsulosin 


3. CKD stage II-III, stable. 


4. Failure to thrive. 


5. Dementia with behavioral disturbance. Oriented to self only. Resume 

outpatient psych meds. Risperidone 2mg at night per psychiatry and ativan prn 


6.HTN. Amlodipine 10mg, Atenolol stopped due to bradycardia 


7. Etoh dependence in the past. Thiamine and folate 





has no insurance,  involved. applied for medical 





Time of this note may not reflect time of encounter. 


I spent 15 minutes on this encounter. 50% or more on counselling and care 

coordination.





Subjective


Allergies:  


Coded Allergies:  


     No Known Allergies (Unverified , 7/23/19)


Subjective


No acute events. He is calm and cooperative





Objective





Last 24 Hour Vital Signs








  Date Time  Temp Pulse Resp B/P (MAP) Pulse Ox O2 Delivery O2 Flow Rate FiO2


 


9/25/19 07:53      Room Air  


 


9/25/19 04:00 98.2 61 18 126/67 (86) 94   


 


9/25/19 00:00 98.1 66 20 125/70 (88) 94   


 


9/24/19 21:00      Room Air  


 


9/24/19 20:00 97.9 60 19 128/56 (80) 95   


 


9/24/19 16:04 97.9 67 18 121/79 (93) 98   


 


9/24/19 12:00 97.6 69 17 119/70 (86) 98   

















Intake and Output  


 


 9/24/19 9/25/19





 19:00 07:00


 


Intake Total 800 ml 


 


Balance 800 ml 


 


  


 


Intake Oral 800 ml 


 


# Voids 3 1








Height (Feet):  5


Height (Inches):  10.00


Weight (Pounds):  188


Objective


General Appearance:  alert, no distress 


Head:  normocephalic, atraumatic


Eyes:  bilateral eye PERRL, bilateral eye EOMI


ENT:  uvula midline, moist mucus membranes


Neck:  supple, no jvd 


Respiratory:  lungs clear, no respiratory distress, no retraction, no accessory 

muscle use


Cardiovascular :  regular rate, rhythm, no edema, no gallop, no murmur


Gastrointestinal:  non tender, soft, no guarding, no rebound


Musculoskeletal:  moves all extremities 


Neurologic:  alert, oriented to self only


Psychiatric:  mood/affect flat. annoyed 


Skin:  no ulcers, tattoos











Zack Nichols M.D. Sep 25, 2019 08:33

## 2019-09-25 NOTE — NUR
NURSE NOTES:

Received report from SALONI Ocasio. Pt in bed, awake, talkative, no complaints of pain, vitals 
being obtained by SN, pt able to feed self, no apparent respiratory distress noted, bed in 
lowest position, call light within reach.

## 2019-09-25 NOTE — NUR
NURSE NOTES:

Pt received sitting on the side of the bed,awake, confused. No signs of distress at this 
time. Needs attended. Will monitor.

## 2019-09-25 NOTE — NUR
RD ASSESSMENT & RECOMMENDATIONS

SEE CARE ACTIVITY FOR COMPLETE ASSESSMENT



DAILY ESTIMATED NEEDS:

Needs based on Cardiac 80kg adj  

25-30  kcals/kg 

7650-1988  total kcals

1-1.2  g protein/kg

80-96  g total protein 

25-30  mL/kg

1944-0411  total fluid mLs



NUTRITION DIAGNOSIS:

Altered nutrition related lab values r/t clinical status as evidenced by

elev Creat(1.4-> now wnl), elev AST, elev lipase (446), elev BP (154/93

-> wnl at this time).





CURRENT DIET:Regular     

 



PO DIET RECOMMENDATIONS:

LOW NA diet / soft easy chew  



 



ADDITIONAL RECOMMENDATIONS:

1) Obtain a standing weight as able / or calibrated bed scale wt 

2) Updated BMP as able, monitor lytes, replete as needed 

3) Snacks BID in b/w meals  

4) H/o C-diff, rec probiotics 

    Hold COLACE with diarrhea

## 2019-09-25 NOTE — GENERAL PROGRESS NOTE
Assessment/Plan


Problem List:  


(1) Acute encephalopathy


ICD Codes:  G93.40 - Encephalopathy, unspecified


SNOMED:  15480474, 252484070


(2) Failure to thrive


SNOMED:  35547466


Qualifiers:  


   Qualified Codes:  R62.7 - Adult failure to thrive


(3) Staghorn calculus


ICD Codes:  N20.0 - Calculus of kidney


SNOMED:  822393229


(4) Hydronephrosis


ICD Codes:  N13.30 - Unspecified hydronephrosis


SNOMED:  58768970


(5) Encounter for generalized patient complaints


ICD Codes:  Z00.8 - Encounter for other general examination


SNOMED:  618996651


(6) Dementia with behavioral disturbance


ICD Codes:  F03.91 - Unspecified dementia with behavioral disturbance


SNOMED:  9427848665061


Status:  stable


Assessment/Plan:


64 year old male admitted from facility for acute encephalopathy, Failure to 

thrive and dementia with behavioral disturbance





1. Acute encephalopathy/ dementia with behavioral disturbance. Doubt metabolic, 

based on reviewing his labs. His renal function is at baseline and stable 


-psychiatry consult with Dr. Meyers 


-PT, patient seen ambulating independently with walker 


Case management for dc planning 





2. Obstructive uropathy with staghorn calculus.BPH.  Renal function stable. 

Resume Tamsulosin 


3. CKD stage II-III, stable. 


4. Failure to thrive. 


5. Dementia with behavioral disturbance. Oriented to self only. Resume 

outpatient psych meds. Risperidone 2mg at night per psychiatry and ativan prn 


6.HTN. Amlodipine 10mg, Atenolol stopped due to bradycardia 


7. Etoh dependence in the past. Thiamine and folate 





has no insurance,  involved. applied for medical 





Time of this note may not reflect time of encounter. 


I spent 15 minutes on this encounter. 50% or more on counselling and care 

coordination.





Subjective


Date patient seen:  Sep 25, 2019


ROS Limited/Unobtainable:  No


Constitutional:  Denies: no symptoms, chills, diaphoresis, fever, malaise, 

weakness, other


HEENT:  Denies: no symptoms, eye pain, blurred vision, tearing, double vision, 

ear pain, ear discharge, nose pain, nose congestion, throat pain, throat 

swelling, mouth pain, mouth swelling, other


Cardiovascular:  Denies: no symptoms, chest pain, edema, irregular heart rate, 

lightheadedness, palpitations, syncope, other


Respiratory:  Denies: no symptoms, cough, orthopnea, shortness of breath, SOB 

with excertion, SOB at rest, sputum, stridor, wheezing, other


Gastrointestinal/Abdominal:  Denies: no symptoms, abdomen distended, abdominal 

pain, black stools, tarry stools, blood in stool, constipated, diarrhea, 

difficulty swallowing, nausea, poor appetite, poor fluid intake, rectal bleeding

, vomiting, other


Genitourinary:  Denies: no symptoms, burning, discharge, frequency, flank pain, 

hematuria, incontinence, pain, urgency, other


Neurologic/Psychiatric:  Denies: no symptoms, anxiety, depressed, emotional 

problems, headache, numbness, paresthesia, pre-existing deficit, seizure, 

tingling, tremors, weakness, other


Endocrine:  Denies: no symptoms, excessive sweating, flushing, intolerance to 

cold, intolerance to heat, increased hunger, increased thirst, increased urine, 

unexplained weight gain, unexplained weight loss, other


Allergies:  


Coded Allergies:  


     No Known Allergies (Unverified , 7/23/19)


Subjective


No acute events. He is calm and cooperative





Objective





Last 24 Hour Vital Signs








  Date Time  Temp Pulse Resp B/P (MAP) Pulse Ox O2 Delivery O2 Flow Rate FiO2


 


9/25/19 08:59  60  118/78    


 


9/25/19 08:00 98.1 60 14 118/78 (91) 97   


 


9/25/19 07:53      Room Air  


 


9/25/19 04:00 98.2 61 18 126/67 (86) 94   


 


9/25/19 00:00 98.1 66 20 125/70 (88) 94   


 


9/24/19 21:00      Room Air  


 


9/24/19 20:00 97.9 60 19 128/56 (80) 95   


 


9/24/19 16:04 97.9 67 18 121/79 (93) 98   


 


9/24/19 12:00 97.6 69 17 119/70 (86) 98   

















Intake and Output  


 


 9/24/19 9/25/19





 19:00 07:00


 


Intake Total 800 ml 


 


Balance 800 ml 


 


  


 


Intake Oral 800 ml 


 


# Voids 3 1








Height (Feet):  5


Height (Inches):  10.00


Weight (Pounds):  188


Objective


General Appearance:  alert, no distress 


Head:  normocephalic, atraumatic


Eyes:  bilateral eye PERRL, bilateral eye EOMI


ENT:  uvula midline, moist mucus membranes


Neck:  supple, no jvd 


Respiratory:  lungs clear, no respiratory distress, no retraction, no accessory 

muscle use


Cardiovascular :  regular rate, rhythm, no edema, no gallop, no murmur


Gastrointestinal:  non tender, soft, no guarding, no rebound


Musculoskeletal:  moves all extremities 


Neurologic:  alert, oriented to self only


Psychiatric:  mood/affect flat. annoyed 


Skin:  no ulcers, tattoos











Zack Nichols M.D. Sep 25, 2019 11:45

## 2019-09-26 VITALS — DIASTOLIC BLOOD PRESSURE: 72 MMHG | SYSTOLIC BLOOD PRESSURE: 131 MMHG

## 2019-09-26 VITALS — DIASTOLIC BLOOD PRESSURE: 65 MMHG | SYSTOLIC BLOOD PRESSURE: 133 MMHG

## 2019-09-26 VITALS — DIASTOLIC BLOOD PRESSURE: 70 MMHG | SYSTOLIC BLOOD PRESSURE: 145 MMHG

## 2019-09-26 VITALS — SYSTOLIC BLOOD PRESSURE: 145 MMHG | DIASTOLIC BLOOD PRESSURE: 63 MMHG

## 2019-09-26 VITALS — SYSTOLIC BLOOD PRESSURE: 155 MMHG | DIASTOLIC BLOOD PRESSURE: 85 MMHG

## 2019-09-26 VITALS — SYSTOLIC BLOOD PRESSURE: 129 MMHG | DIASTOLIC BLOOD PRESSURE: 76 MMHG

## 2019-09-26 RX ADMIN — DOCUSATE SODIUM SCH MG: 100 CAPSULE, LIQUID FILLED ORAL at 08:04

## 2019-09-26 RX ADMIN — Medication SCH MG: at 08:04

## 2019-09-26 RX ADMIN — HEPARIN SODIUM SCH UNITS: 5000 INJECTION INTRAVENOUS; SUBCUTANEOUS at 20:06

## 2019-09-26 RX ADMIN — TAMSULOSIN HYDROCHLORIDE SCH MG: 0.4 CAPSULE ORAL at 08:04

## 2019-09-26 RX ADMIN — TAMSULOSIN HYDROCHLORIDE SCH MG: 0.4 CAPSULE ORAL at 17:05

## 2019-09-26 RX ADMIN — HEPARIN SODIUM SCH UNITS: 5000 INJECTION INTRAVENOUS; SUBCUTANEOUS at 08:05

## 2019-09-26 RX ADMIN — DOCUSATE SODIUM SCH MG: 100 CAPSULE, LIQUID FILLED ORAL at 20:06

## 2019-09-26 NOTE — GENERAL PROGRESS NOTE
Assessment/Plan


Problem List:  


(1) Acute encephalopathy


ICD Codes:  G93.40 - Encephalopathy, unspecified


SNOMED:  53720134, 841897717


(2) Failure to thrive


SNOMED:  91008984


Qualifiers:  


   Qualified Codes:  R62.7 - Adult failure to thrive


(3) Staghorn calculus


ICD Codes:  N20.0 - Calculus of kidney


SNOMED:  769900093


(4) Hydronephrosis


ICD Codes:  N13.30 - Unspecified hydronephrosis


SNOMED:  68340134


(5) Encounter for generalized patient complaints


ICD Codes:  Z00.8 - Encounter for other general examination


SNOMED:  679406349


(6) Dementia with behavioral disturbance


ICD Codes:  F03.91 - Unspecified dementia with behavioral disturbance


SNOMED:  3025079073394


Status:  stable


Assessment/Plan:


64 year old male admitted from facility for acute encephalopathy, Failure to 

thrive and dementia with behavioral disturbance





1. Acute encephalopathy/ dementia with behavioral disturbance. Doubt metabolic, 

based on reviewing his labs. His renal function is at baseline and stable 


-psychiatry consult with Dr. Meyers 


-PT, patient seen ambulating independently with walker 


Case management for dc planning 





2. Obstructive uropathy with staghorn calculus.BPH.  Renal function stable. 

Resume Tamsulosin 


3. CKD stage II-III, stable. 


4. Failure to thrive. 


5. Dementia with behavioral disturbance. Oriented to self only. Resume 

outpatient psych meds. Risperidone 2mg at night per psychiatry and ativan prn 


6.HTN. Amlodipine 10mg, Atenolol stopped due to bradycardia 


7. Etoh dependence in the past. Thiamine and folate 





has no insurance,  involved. applied for medical 





Time of this note may not reflect time of encounter. 


I spent 15 minutes on this encounter. 50% or more on counselling and care 

coordination.





Subjective


Date patient seen:  Sep 26, 2019


ROS Limited/Unobtainable:  No


Constitutional:  Denies: no symptoms, chills, diaphoresis, fever, malaise, 

weakness, other


Cardiovascular:  Denies: no symptoms, chest pain, edema, irregular heart rate, 

lightheadedness, palpitations, syncope, other


Gastrointestinal/Abdominal:  Denies: no symptoms, abdomen distended, abdominal 

pain, black stools, tarry stools, blood in stool, constipated, diarrhea, 

difficulty swallowing, nausea, poor appetite, poor fluid intake, rectal bleeding

, vomiting, other


Hematologic/Lymphatic:  Denies: no symptoms, anemia, easy bleeding, easy 

bruising, other


Allergies:  


Coded Allergies:  


     No Known Allergies (Unverified , 7/23/19)


Subjective


No acute events. He is calm and cooperative





Objective





Last 24 Hour Vital Signs








  Date Time  Temp Pulse Resp B/P (MAP) Pulse Ox O2 Delivery O2 Flow Rate FiO2


 


9/26/19 08:04  67  145/70    


 


9/26/19 08:00 98.7 67 16 145/70 (95) 96   


 


9/26/19 07:18      Room Air  


 


9/26/19 04:00 98.7 60 18 133/65 (87) 97   


 


9/26/19 00:00 98.5 59 16 145/63 (90) 96   


 


9/25/19 21:00      Room Air  


 


9/25/19 20:00 98.7 66 20 108/55 (72) 97   


 


9/25/19 20:00 98.7 66 20 108/55 (72) 97   


 


9/25/19 16:00 98.6 67 16 129/69 (89) 96   


 


9/25/19 12:00 98.1 73 14 121/69 (86) 96   

















Intake and Output  


 


 9/25/19 9/26/19





 19:00 07:00


 


Intake Total 900 ml 


 


Balance 900 ml 


 


  


 


Intake Oral 900 ml 


 


# Voids 4 1


 


# Bowel Movements 1 








Height (Feet):  5


Height (Inches):  10.00


Weight (Pounds):  188


Objective


General Appearance:  alert, no distress 


Head:  normocephalic, atraumatic


Eyes:  bilateral eye PERRL, bilateral eye EOMI


ENT:  uvula midline, moist mucus membranes


Neck:  supple, no jvd 


Respiratory:  lungs clear, no respiratory distress, no retraction, no accessory 

muscle use


Cardiovascular :  regular rate, rhythm, no edema, no gallop, no murmur


Gastrointestinal:  non tender, soft, no guarding, no rebound


Musculoskeletal:  moves all extremities 


Neurologic:  alert, oriented to self only


Psychiatric:  mood/affect flat. annoyed 


Skin:  no ulcers, tattoos











Zack Nichols M.D. Sep 26, 2019 11:35

## 2019-09-26 NOTE — NUR
NURSE NOTES:

Pt received in bed, awake and alert. No signs of acute distress, call light and walkr within 
reach. Yellow socks on. Will monitor.

## 2019-09-26 NOTE — NUR
NURSE NOTES:

Received report from SALONI Boudreaux. Pt in bed, awake, talkative, awaiting breakfast tray, 
states he slept well, no complaints of pain, no apparent distress noted, bed in lowest 
position, call light within reach, walker at bedside.

## 2019-09-26 NOTE — NUR
CASE MANAGEMENT:REVIEW



9/26/19

SI:FTT. GRAVELY DISABLED

98.7   67  16  145/70   96% on ra



IS: NORVASC PO QD

LEXAPRO PO QD

RISPERDAL PO QHS

FLOMAX PO BID

ATIVAN PO Q6HRS PRN

IVF@75/HR

HEPARIN SQ Q12

**: MED/SURG STATUS 4 EAST





PLAN:

PATIENT HAS BEEN REFERRED TO 

[facility] T: [phone] AND [facility] T: [phone]...ACCEPTANCE PENDING

AUTHORIZATION NEEDS TO BE GIVEN BY [facility] FOR SNF PLACEMENT

## 2019-09-26 NOTE — NUR
NURSE NOTES:

Received report from Janusz RN, patient is asleep, stable condition, will continue to 
monitor for comfort and safety.

## 2019-09-27 VITALS — SYSTOLIC BLOOD PRESSURE: 138 MMHG | DIASTOLIC BLOOD PRESSURE: 82 MMHG

## 2019-09-27 VITALS — DIASTOLIC BLOOD PRESSURE: 82 MMHG | SYSTOLIC BLOOD PRESSURE: 148 MMHG

## 2019-09-27 VITALS — DIASTOLIC BLOOD PRESSURE: 84 MMHG | SYSTOLIC BLOOD PRESSURE: 137 MMHG

## 2019-09-27 VITALS — DIASTOLIC BLOOD PRESSURE: 84 MMHG | SYSTOLIC BLOOD PRESSURE: 140 MMHG

## 2019-09-27 VITALS — SYSTOLIC BLOOD PRESSURE: 157 MMHG | DIASTOLIC BLOOD PRESSURE: 78 MMHG

## 2019-09-27 VITALS — DIASTOLIC BLOOD PRESSURE: 78 MMHG | SYSTOLIC BLOOD PRESSURE: 135 MMHG

## 2019-09-27 RX ADMIN — DOCUSATE SODIUM SCH MG: 100 CAPSULE, LIQUID FILLED ORAL at 21:20

## 2019-09-27 RX ADMIN — HEPARIN SODIUM SCH UNITS: 5000 INJECTION INTRAVENOUS; SUBCUTANEOUS at 08:13

## 2019-09-27 RX ADMIN — DOCUSATE SODIUM SCH MG: 100 CAPSULE, LIQUID FILLED ORAL at 08:08

## 2019-09-27 RX ADMIN — HEPARIN SODIUM SCH UNITS: 5000 INJECTION INTRAVENOUS; SUBCUTANEOUS at 21:24

## 2019-09-27 RX ADMIN — TAMSULOSIN HYDROCHLORIDE SCH MG: 0.4 CAPSULE ORAL at 17:23

## 2019-09-27 RX ADMIN — Medication SCH MG: at 08:08

## 2019-09-27 RX ADMIN — TAMSULOSIN HYDROCHLORIDE SCH MG: 0.4 CAPSULE ORAL at 08:09

## 2019-09-27 NOTE — NUR
CASE MANAGEMENT:REVIEW



9/27/19

SI:FTT. GRAVELY DISABLED

98.4   63  20  138/82  93% ON RA



IS: NORVASC PO QD

LEXAPRO PO QD

RISPERDAL PO QHS

FLOMAX PO BID

ATIVAN PO Q6HRS PRN

IVF@75/HR

HEPARIN SQ Q12

**: MED/SURG STATUS 4 EAST





PLAN:

[facility] DECL[facility]ED TO ACCEPT

[facility] DECLINED TO ACCEPT

REFERRED TO [facility]

## 2019-09-27 NOTE — NUR
NURSE NOTES:

Received report from SALONI Simon. Pt in bed, A/A/Ox4, asleep. siderails are up x3. no 
complaints of pain and discomfort noted, unlabored breathing.  bed in lowest position, call 
light within reach, walker at bedside. bed is in the lowest position, alarm and locked @ all 
times. will cont to monitor.

## 2019-09-27 NOTE — NUR
***DISCHARGE PLANNING

REFERRED TO [facility] ~ [facility] DECLINED

REFERRED TO [facility] ~ [facility] DECLINED

FAXED TO [facility] ~ GABY WILL COME OUT TOMORROW TO ASSESS FOR APPROPRIATENESS

-------------------------------------------------------------------------------

Addendum: 09/27/19 at 1344 by CLARISSE HANKS LVN LVN

-------------------------------------------------------------------------------

REFERRED TO [facility] DECLINED TO ACCEPT

-------------------------------------------------------------------------------

Addendum: 09/27/19 at 1546 by CLARISSE HANKS LVN LVN

-------------------------------------------------------------------------------

REFERRED TO [facility] ~ PER CINTHYA ~ NO MALE BED AVAILABLE

## 2019-09-27 NOTE — NUR
*-* INSURANCE *-*



UPDATED CLINICALS AND REVIEWS HAVE BEEN FAXED TO:



[facility]

TRACKING [id number]

CARLOS:LEANN T: 

P:[phone]

F: [phone]

## 2019-09-27 NOTE — NUR
NURSE NOTES: RECEIVED PATIENT LYING IN BED, AWAKE, ALERT/ORIENTED TO PERSON/PLACE, REALITY 
ORIENTATION PROVIDED, REQUIRE REPETITIVE TEACHINGS DUE TO COGNITIVE STATUS, DENIES PAIN. NO 
SIGNS AND SYMPTOMS OF ACUTE CARDIO RESPIRATORY DISTRESS/SHORTNESS OF BREATH, DENIES CHEST 
PAIN, NO PERIPHERAL EDEMA NOTED. NO REPORT OF GI DISCOMFORT, REQUIRE ASSISTANCE/SUPERVISION 
WHILE AMBULATING, FWW AT BEDSIDE. SIDE RAILS UP X3/BED IN LOWEST POSITION FOR SAFETY, 
FRQUENT ROUNDING FOR SAFETY/NEEDS, FALL PRECAUTIONS OBSERVED AT ALL TIMES, 
ENCOURAGED/REINFORCED IMPORTANCE OF UTILIZING CALL LIGHT FOR ASSISTANCE, VERBALIZED 
UNDERSTANDING, REQUIRE FREQUENT REMINDERS. NAD.

## 2019-09-28 VITALS — DIASTOLIC BLOOD PRESSURE: 72 MMHG | SYSTOLIC BLOOD PRESSURE: 150 MMHG

## 2019-09-28 VITALS — DIASTOLIC BLOOD PRESSURE: 91 MMHG | SYSTOLIC BLOOD PRESSURE: 154 MMHG

## 2019-09-28 VITALS — DIASTOLIC BLOOD PRESSURE: 86 MMHG | SYSTOLIC BLOOD PRESSURE: 149 MMHG

## 2019-09-28 VITALS — DIASTOLIC BLOOD PRESSURE: 72 MMHG | SYSTOLIC BLOOD PRESSURE: 135 MMHG

## 2019-09-28 VITALS — SYSTOLIC BLOOD PRESSURE: 142 MMHG | DIASTOLIC BLOOD PRESSURE: 71 MMHG

## 2019-09-28 VITALS — DIASTOLIC BLOOD PRESSURE: 91 MMHG | SYSTOLIC BLOOD PRESSURE: 160 MMHG

## 2019-09-28 RX ADMIN — HEPARIN SODIUM SCH UNITS: 5000 INJECTION INTRAVENOUS; SUBCUTANEOUS at 08:58

## 2019-09-28 RX ADMIN — Medication SCH MG: at 08:56

## 2019-09-28 RX ADMIN — DOCUSATE SODIUM SCH MG: 100 CAPSULE, LIQUID FILLED ORAL at 21:14

## 2019-09-28 RX ADMIN — HEPARIN SODIUM SCH UNITS: 5000 INJECTION INTRAVENOUS; SUBCUTANEOUS at 21:00

## 2019-09-28 RX ADMIN — TAMSULOSIN HYDROCHLORIDE SCH MG: 0.4 CAPSULE ORAL at 08:56

## 2019-09-28 RX ADMIN — TAMSULOSIN HYDROCHLORIDE SCH MG: 0.4 CAPSULE ORAL at 17:30

## 2019-09-28 RX ADMIN — DOCUSATE SODIUM SCH MG: 100 CAPSULE, LIQUID FILLED ORAL at 08:56

## 2019-09-28 NOTE — GENERAL PROGRESS NOTE
Assessment/Plan


Status:  stable


Assessment/Plan:


Diego Wilder is a 64 year old male admitted from facility for acute 

encephalopathy, Failure to thrive and dementia with behavioral disturbance, now 

stable awaiting discharge. 





1. Acute encephalopathy/ dementia with behavioral disturbance. Doubt metabolic, 

based on reviewing his labs. His renal function is at baseline and stable 


-psychiatry consult with Dr. Meyers 


-PT, patient seen ambulating independently with walker 


Case management for dc planning 





2. Obstructive uropathy with staghorn calculus.BPH.  Renal function stable. 

Tamsulosin 


3. CKD stage II-III, stable. 


4. Failure to thrive. improving 


5. Dementia with behavioral disturbance. Oriented to self only. Resume 

outpatient psych meds. Risperidone 2mg at night per psychiatry and ativan prn 


6.HTN. Amlodipine 10mg, Atenolol stopped due to bradycardia


7. Etoh dependence in the past. Thiamine and folate 





has no insurance,  involved. applied for medical 





Time of this note may not reflect time of encounter. 





I spent 25 minutes on this patient's case including >50% time dedicated to 

counseling and/or coordination of care. I spent additional 35 minutes reviewing 

patient's records (labs, imaging, progress notes, consultant notes, orders, etc

) as patient is new to me





Subjective


Date patient seen:  Sep 28, 2019


Constitutional:  Reports: no symptoms


HEENT:  Reports: no symptoms


Cardiovascular:  Reports: no symptoms


Respiratory:  Reports: no symptoms


Gastrointestinal/Abdominal:  Reports: no symptoms


Genitourinary:  Reports: no symptoms


Neurologic/Psychiatric:  Reports: weakness


Endocrine:  Reports: no symptoms


Hematologic/Lymphatic:  Reports: no symptoms


Allergies:  


Coded Allergies:  


     No Known Allergies (Unverified , 7/23/19)


Subjective


patient seen and examined. No acute complaints, feeling well, Eating well, +BM





Objective





Last 24 Hour Vital Signs








  Date Time  Temp Pulse Resp B/P (MAP) Pulse Ox O2 Delivery O2 Flow Rate FiO2


 


9/28/19 15:59 97.2 73 18 149/86 (107) 98   





  73      


 


9/28/19 12:02 98.0 65 18 154/82 (106) 97   


 


9/28/19 09:00      Room Air  


 


9/28/19 08:56  87  155/81    


 


9/28/19 08:00 98.6 73 20 160/91 (114) 97   


 


9/28/19 04:00 97.9 75 19 135/72 (93) 95   


 


9/28/19 00:00 97.8 80 18 150/72 (98) 95   


 


9/27/19 21:00      Room Air  


 


9/27/19 20:00 98.4 66 18 157/78 (104) 96   

















Intake and Output  


 


 9/27/19 9/28/19





 19:00 07:00


 


Intake Total 240 ml 480 ml


 


Balance 240 ml 480 ml


 


  


 


Intake Oral 240 ml 480 ml


 


# Voids 4 3








Height (Feet):  5


Height (Inches):  10.00


Weight (Pounds):  188


General Appearance:  no apparent distress, alert


Neck:  supple


Cardiovascular:  normal rate, regular rhythm


Respiratory/Chest:  lungs clear, normal breath sounds, no respiratory distress


Abdomen:  normal bowel sounds, non tender, soft


Extremities:  non-tender


Edema:  no edema noted Arm (L), no edema noted Arm (R), no edema noted Leg (L), 

no edema noted Leg (R), no edema noted Pedal (L), no edema noted Pedal (R), no 

edema noted Generalized


Neurologic:  alert, responsive











Elodia Pizarro M.D. Sep 28, 2019 17:32

## 2019-09-28 NOTE — NUR
NURSE NOTES:

Received report from SAGAR Antunez. Patient a/a/o x 2, breathing unlabored without distress, 
discomfort, or sob on room air. Denies pain at this time. No IV site, MD aware. Ambulated 
with walker and walker at bedside. Bed placed at the lowest with alarm, brake, and siderails 
up for safety. Call light placed within reach. Will continue to monitor and provide care as 
ordered.

## 2019-09-28 NOTE — NUR
NURSE NOTES:

Received report from SALONI Chavez. Pt in bed, A/A/Ox4, having breakfast. ambulates with steady 
and slow pace. siderails are up x3. no complaints of pain and discomfort noted, unlabored 
breathing. Require repetitive orientation. bed in lowest position, call light within reach, 
walker at bedside. bed is in the lowest position, alarm and locked @ all times. will cont to 
monitor.

## 2019-09-29 VITALS — SYSTOLIC BLOOD PRESSURE: 132 MMHG | DIASTOLIC BLOOD PRESSURE: 73 MMHG

## 2019-09-29 VITALS — SYSTOLIC BLOOD PRESSURE: 138 MMHG | DIASTOLIC BLOOD PRESSURE: 70 MMHG

## 2019-09-29 VITALS — SYSTOLIC BLOOD PRESSURE: 138 MMHG | DIASTOLIC BLOOD PRESSURE: 65 MMHG

## 2019-09-29 VITALS — SYSTOLIC BLOOD PRESSURE: 140 MMHG | DIASTOLIC BLOOD PRESSURE: 80 MMHG

## 2019-09-29 VITALS — DIASTOLIC BLOOD PRESSURE: 58 MMHG | SYSTOLIC BLOOD PRESSURE: 140 MMHG

## 2019-09-29 VITALS — SYSTOLIC BLOOD PRESSURE: 144 MMHG | DIASTOLIC BLOOD PRESSURE: 82 MMHG

## 2019-09-29 RX ADMIN — HEPARIN SODIUM SCH UNITS: 5000 INJECTION INTRAVENOUS; SUBCUTANEOUS at 08:33

## 2019-09-29 RX ADMIN — HEPARIN SODIUM SCH UNITS: 5000 INJECTION INTRAVENOUS; SUBCUTANEOUS at 21:00

## 2019-09-29 RX ADMIN — TAMSULOSIN HYDROCHLORIDE SCH MG: 0.4 CAPSULE ORAL at 08:32

## 2019-09-29 RX ADMIN — DOCUSATE SODIUM SCH MG: 100 CAPSULE, LIQUID FILLED ORAL at 08:32

## 2019-09-29 RX ADMIN — Medication SCH MG: at 08:32

## 2019-09-29 RX ADMIN — DOCUSATE SODIUM SCH MG: 100 CAPSULE, LIQUID FILLED ORAL at 21:08

## 2019-09-29 RX ADMIN — TAMSULOSIN HYDROCHLORIDE SCH MG: 0.4 CAPSULE ORAL at 17:28

## 2019-09-29 NOTE — NUR
NURSE NOTES:

Received report from SAGAR Antunez. Patient a/a/o x 2, sitting in a chair. Breathing unlabored 
without distress, discomfort, or sob on room air. Able to ambulate but with weak gait. 
Observation is needed for patient safety. Denies pain at this time. No IV site, MD aware. 
Bed placed at the lowest with alarm, brake, and siderails up for patient safety. Call light 
placed within reach. Will continue to monitor and provide care as ordered.

## 2019-09-29 NOTE — GENERAL PROGRESS NOTE
Assessment/Plan


Status:  doing well, stable


Assessment/Plan:


Diego Wilder is a 64 year old male admitted from facility for acute 

encephalopathy, Failure to thrive and dementia with behavioral disturbance, now 

stable awaiting discharge. 





1. Acute encephalopathy/ dementia with behavioral disturbance. Doubt metabolic, 

based on reviewing his labs. His renal function is at baseline and stable 


-psychiatry consult with Dr. Meyers 


-PT, patient seen ambulating independently with walker 


Case management for dc planning 





2. Obstructive uropathy with staghorn calculus.BPH.  Renal function stable. 

Tamsulosin 


3. CKD stage II-III, stable. 


4. Failure to thrive. improving 


5. Dementia with behavioral disturbance. Oriented to self only. Resume 

outpatient psych meds. Risperidone 2mg at night per psychiatry and ativan prn 


6.HTN. Amlodipine 10mg, Atenolol stopped due to bradycardia


7. Etoh dependence in the past. Thiamine and folate 





has no insurance,  involved. applied for medical 





Time of this note may not reflect time of encounter. 





I spent 25 minutes on this patient's case including >50% time dedicated to 

counseling and/or coordination of care.





Subjective


Date patient seen:  Sep 29, 2019


Constitutional:  Reports: no symptoms


HEENT:  Reports: no symptoms


Cardiovascular:  Reports: no symptoms


Respiratory:  Reports: no symptoms


Gastrointestinal/Abdominal:  Reports: no symptoms


Genitourinary:  Reports: no symptoms


Neurologic/Psychiatric:  Reports: no symptoms


Endocrine:  Reports: no symptoms


Allergies:  


Coded Allergies:  


     No Known Allergies (Unverified , 7/23/19)


Subjective


patient seen and examined. No acute complaints, feeling well, Eating well, +BM 

daily. chronic pain stable unchanged.





Objective





Last 24 Hour Vital Signs








  Date Time  Temp Pulse Resp B/P (MAP) Pulse Ox O2 Delivery O2 Flow Rate FiO2


 


9/29/19 11:39 97.0 60 18 140/80 (100) 95   


 


9/29/19 08:33  55  144/82    


 


9/29/19 08:00 98.5 55 16 144/82 (102) 96   


 


9/29/19 07:55      Room Air  


 


9/29/19 04:00 97.5 60 18 138/65 (89) 95   


 


9/29/19 00:00 98.0 59 18 138/70 (92) 95   


 


9/28/19 21:00      Room Air  


 


9/28/19 20:00 96.9 57 19 142/71 (94)    





  57      


 


9/28/19 18:25 97.2       


 


9/28/19 15:59 97.2 73 18 149/86 (107) 98   





  73      

















Intake and Output  


 


 9/28/19 9/29/19





 19:00 07:00


 


Intake Total 720 ml 1040 ml


 


Balance 720 ml 1040 ml


 


  


 


Intake Oral 720 ml 240 ml


 


Other  800 ml


 


# Voids 3 4


 


# Bowel Movements  1








Height (Feet):  5


Height (Inches):  10.00


Weight (Pounds):  188


General Appearance:  no apparent distress, alert


Neck:  supple


Cardiovascular:  normal peripheral pulses, normal rate, regular rhythm


Respiratory/Chest:  lungs clear, normal breath sounds, no respiratory distress


Abdomen:  normal bowel sounds, non tender, soft


Extremities:  non-tender


Edema:  no edema noted Arm (L), no edema noted Arm (R), no edema noted Leg (L), 

no edema noted Leg (R), no edema noted Pedal (L), no edema noted Pedal (R), no 

edema noted Generalized


Neurologic:  alert, responsive


Skin:  warm/dry











Elodia Pizarro M.D. Sep 29, 2019 15:59

## 2019-09-29 NOTE — NUR
NURSE NOTES:

Received report from Tommie, SALONI. Pt in bed, A/A/Ox4, having breakfast. ambulates with steady 
and slow pace. siderails are up x3. no complaints of pain and discomfort noted, unlabored 
breathing. Require repetitive orientation. having breakfast in bed and tolerating well. bed 
in lowest position, call light within reach, walker at bedside. bed is in the lowest 
position, alarm and locked @ all times. will cont to monitor.

## 2019-09-30 VITALS — SYSTOLIC BLOOD PRESSURE: 146 MMHG | DIASTOLIC BLOOD PRESSURE: 71 MMHG

## 2019-09-30 VITALS — SYSTOLIC BLOOD PRESSURE: 138 MMHG | DIASTOLIC BLOOD PRESSURE: 74 MMHG

## 2019-09-30 VITALS — DIASTOLIC BLOOD PRESSURE: 75 MMHG | SYSTOLIC BLOOD PRESSURE: 142 MMHG

## 2019-09-30 VITALS — DIASTOLIC BLOOD PRESSURE: 82 MMHG | SYSTOLIC BLOOD PRESSURE: 159 MMHG

## 2019-09-30 VITALS — SYSTOLIC BLOOD PRESSURE: 146 MMHG | DIASTOLIC BLOOD PRESSURE: 73 MMHG

## 2019-09-30 VITALS — DIASTOLIC BLOOD PRESSURE: 78 MMHG | SYSTOLIC BLOOD PRESSURE: 150 MMHG

## 2019-09-30 RX ADMIN — DOCUSATE SODIUM SCH MG: 100 CAPSULE, LIQUID FILLED ORAL at 08:38

## 2019-09-30 RX ADMIN — DOCUSATE SODIUM SCH MG: 100 CAPSULE, LIQUID FILLED ORAL at 21:16

## 2019-09-30 RX ADMIN — OXYBUTYNIN CHLORIDE SCH MG: 5 TABLET ORAL at 13:15

## 2019-09-30 RX ADMIN — HEPARIN SODIUM SCH UNITS: 5000 INJECTION INTRAVENOUS; SUBCUTANEOUS at 08:43

## 2019-09-30 RX ADMIN — TAMSULOSIN HYDROCHLORIDE SCH MG: 0.4 CAPSULE ORAL at 08:38

## 2019-09-30 RX ADMIN — TAMSULOSIN HYDROCHLORIDE SCH MG: 0.4 CAPSULE ORAL at 17:17

## 2019-09-30 RX ADMIN — OXYBUTYNIN CHLORIDE SCH MG: 5 TABLET ORAL at 21:16

## 2019-09-30 RX ADMIN — HEPARIN SODIUM SCH UNITS: 5000 INJECTION INTRAVENOUS; SUBCUTANEOUS at 21:00

## 2019-09-30 RX ADMIN — Medication SCH MG: at 08:38

## 2019-09-30 NOTE — NUR
NURSE NOTES:

Patient in bed, awake, alert x 3, periods of confusion. Kept clean and comfortable. Provided 
safe environment. Bed in low and locked position. Abdomen is soft and non distended. Skin is 
warm and dry to touch. Respiration is even and unlabored. No complaint of pain or discomfort 
at the moment. Call light is at bedside. Will continue plan of care.

## 2019-09-30 NOTE — NUR
CASE MANAGEMENT:REVIEW



9/30/19

SI:FTT. GRAVELY DISABLED

**URINATING ON FLOOR

98.4  58  18  150/78  97% ON RA



IS: START DITROPAN PO Q8HRS

NORVASC PO QD

LEXAPRO PO QD

RISPERDAL PO QHS

FLOMAX PO BID

ATIVAN PO Q6HRS PRN

IVF@75/HR

HEPARIN SQ Q12

**: MED/SURG STATUS 4 EAST





PLAN:

HAVING DIFFICULTY FINDING ANY SNF THAT WILL ACCEPT THIS PATIENT BUT WILL KEEP TRYING

## 2019-09-30 NOTE — NUR
*-* INSURANCE *-*



ALL CLINICALS FROM [facility] TO PRESENT HAVE BEEN FAXED TO:



[facility]:JENNYFER GRIER

P: [phone]

F: [phone]

## 2019-09-30 NOTE — GENERAL PROGRESS NOTE
Assessment/Plan


Problem List:  


(1) Acute encephalopathy


ICD Codes:  G93.40 - Encephalopathy, unspecified


SNOMED:  55950110, 290968870


(2) Failure to thrive


SNOMED:  00190627


Qualifiers:  


   Qualified Codes:  R62.7 - Adult failure to thrive


(3) Staghorn calculus


ICD Codes:  N20.0 - Calculus of kidney


SNOMED:  358778157


(4) Hydronephrosis


ICD Codes:  N13.30 - Unspecified hydronephrosis


SNOMED:  22269565


(5) Encounter for generalized patient complaints


ICD Codes:  Z00.8 - Encounter for other general examination


SNOMED:  871833653


(6) Dementia with behavioral disturbance


ICD Codes:  F03.91 - Unspecified dementia with behavioral disturbance


SNOMED:  6618592981500


(7) Neurogenic bladder


ICD Codes:  N31.9 - Neuromuscular dysfunction of bladder, unspecified


SNOMED:  681902463


Status:  doing well, stable


Assessment/Plan:


64 year old male admitted from facility for acute encephalopathy, Failure to 

thrive and dementia with behavioral disturbance





1. Acute encephalopathy/ dementia with behavioral disturbance. Doubt metabolic, 

based on reviewing his labs. His renal function is at baseline and stable 


-psychiatry consult with Dr. Meyers 


-PT, patient seen ambulating independently with walker 


Case management for dc planning 





2. Obstructive uropathy with staghorn calculus.BPH.  Renal function stable. 

Resume Tamsulosin 


3. CKD stage II-III, stable. 


4. Failure to thrive. 


5. Dementia with behavioral disturbance. Oriented to self only. Resume 

outpatient psych meds. Risperidone 2mg at night per psychiatry and ativan prn 


6.HTN. Amlodipine 10mg, Atenolol stopped due to bradycardia 


7. Etoh dependence in the past. Thiamine and folate 


8. Neurogenic bladder: Oxybutynin 5mg TID 





has no insurance,  involved. applied for medical 





Time of this note may not reflect time of encounter. 


I spent 15 minutes on this encounter. 50% or more on counselling and care 

coordination.





Subjective


Date patient seen:  Sep 30, 2019


ROS Limited/Unobtainable:  No


Constitutional:  Denies: no symptoms, chills, diaphoresis, fever, malaise, 

weakness, other


HEENT:  Denies: no symptoms, eye pain, blurred vision, tearing, double vision, 

ear pain, ear discharge, nose pain, nose congestion, throat pain, throat 

swelling, mouth pain, mouth swelling, other


Cardiovascular:  Denies: no symptoms, chest pain, edema, irregular heart rate, 

lightheadedness, palpitations, syncope, other


Gastrointestinal/Abdominal:  Denies: no symptoms, abdomen distended, abdominal 

pain, black stools, tarry stools, blood in stool, constipated, diarrhea, 

difficulty swallowing, nausea, poor appetite, poor fluid intake, rectal bleeding

, vomiting, other


Genitourinary:  Reports: incontinence, urgency


Neurologic/Psychiatric:  Denies: no symptoms, anxiety, depressed, emotional 

problems, headache, numbness, paresthesia, pre-existing deficit, seizure, 

tingling, tremors, weakness, other


Endocrine:  Denies: no symptoms, excessive sweating, flushing, intolerance to 

cold, intolerance to heat, increased hunger, increased thirst, increased urine, 

unexplained weight gain, unexplained weight loss, other


Hematologic/Lymphatic:  Denies: no symptoms, anemia, easy bleeding, easy 

bruising, other


Allergies:  


Coded Allergies:  


     No Known Allergies (Unverified , 7/23/19)


Subjective


No acute events. He is calm and cooperative





Objective





Last 24 Hour Vital Signs








  Date Time  Temp Pulse Resp B/P (MAP) Pulse Ox O2 Delivery O2 Flow Rate FiO2


 


9/30/19 09:00      Room Air  


 


9/30/19 08:38  69  159/82    


 


9/30/19 08:00 98.1 69 18 159/82 (107) 95   


 


9/30/19 04:00 97.9 64 18 142/75 (97) 95   


 


9/30/19 00:00 98.0 68 18 138/74 (95) 95   


 


9/29/19 21:00      Room Air  


 


9/29/19 20:00 98.3 60 18 132/73 (92) 96   


 


9/29/19 16:00 98.7 73 18 140/58 (85) 98   

















Intake and Output  


 


 9/29/19 9/30/19





 19:00 07:00


 


Intake Total  1040 ml


 


Balance  1040 ml


 


  


 


Intake Oral  240 ml


 


Other  800 ml


 


# Voids 2 5


 


# Bowel Movements 2 1








Height (Feet):  5


Height (Inches):  10.00


Weight (Pounds):  188


Objective


General Appearance:  alert, no distress 


Head:  normocephalic, atraumatic


Eyes:  bilateral eye PERRL, bilateral eye EOMI


ENT:  uvula midline, moist mucus membranes


Neck:  supple, no jvd 


Respiratory:  lungs clear, no respiratory distress, no retraction, no accessory 

muscle use


Cardiovascular :  regular rate, rhythm, no edema, no gallop, no murmur


Gastrointestinal:  non tender, soft, no guarding, no rebound


Musculoskeletal:  moves all extremities 


Neurologic:  alert, oriented to self only


Psychiatric:  mood/affect flat. annoyed 


Skin:  no ulcers, tattoos











Zack Nichols M.D. Sep 30, 2019 12:01

## 2019-09-30 NOTE — NUR
NURSE NOTES:

Patient received in stable condition, ambulating with walker in the hallway, voided in the 
hallway. Patient denies pain or SOB. No signs of distress noted. Oriented back to room. Bed 
locked in lowest position, call light placed within reach. Will continue to monitor.

## 2019-10-01 VITALS — DIASTOLIC BLOOD PRESSURE: 86 MMHG | SYSTOLIC BLOOD PRESSURE: 154 MMHG

## 2019-10-01 VITALS — SYSTOLIC BLOOD PRESSURE: 148 MMHG | DIASTOLIC BLOOD PRESSURE: 70 MMHG

## 2019-10-01 VITALS — SYSTOLIC BLOOD PRESSURE: 159 MMHG | DIASTOLIC BLOOD PRESSURE: 81 MMHG

## 2019-10-01 VITALS — DIASTOLIC BLOOD PRESSURE: 73 MMHG | SYSTOLIC BLOOD PRESSURE: 138 MMHG

## 2019-10-01 VITALS — SYSTOLIC BLOOD PRESSURE: 133 MMHG | DIASTOLIC BLOOD PRESSURE: 68 MMHG

## 2019-10-01 LAB
ANION GAP SERPL CALC-SCNC: 9 MMOL/L (ref 5–15)
BUN SERPL-MCNC: 23 MG/DL (ref 7–18)
CALCIUM SERPL-MCNC: 8.7 MG/DL (ref 8.5–10.1)
CHLORIDE SERPL-SCNC: 108 MMOL/L (ref 98–107)
CO2 SERPL-SCNC: 25 MMOL/L (ref 21–32)
CREAT SERPL-MCNC: 1.5 MG/DL (ref 0.55–1.3)
POTASSIUM SERPL-SCNC: 4 MMOL/L (ref 3.5–5.1)
SODIUM SERPL-SCNC: 141 MMOL/L (ref 136–145)

## 2019-10-01 RX ADMIN — HEPARIN SODIUM SCH UNITS: 5000 INJECTION INTRAVENOUS; SUBCUTANEOUS at 08:31

## 2019-10-01 RX ADMIN — TAMSULOSIN HYDROCHLORIDE SCH MG: 0.4 CAPSULE ORAL at 08:30

## 2019-10-01 RX ADMIN — LISINOPRIL SCH MG: 2.5 TABLET ORAL at 18:48

## 2019-10-01 RX ADMIN — HEPARIN SODIUM SCH UNITS: 5000 INJECTION INTRAVENOUS; SUBCUTANEOUS at 21:16

## 2019-10-01 RX ADMIN — DOCUSATE SODIUM SCH MG: 100 CAPSULE, LIQUID FILLED ORAL at 21:13

## 2019-10-01 RX ADMIN — TAMSULOSIN HYDROCHLORIDE SCH MG: 0.4 CAPSULE ORAL at 17:40

## 2019-10-01 RX ADMIN — Medication SCH MG: at 08:30

## 2019-10-01 RX ADMIN — OXYBUTYNIN CHLORIDE SCH MG: 5 TABLET ORAL at 14:00

## 2019-10-01 RX ADMIN — OXYBUTYNIN CHLORIDE SCH MG: 5 TABLET ORAL at 21:13

## 2019-10-01 RX ADMIN — OXYBUTYNIN CHLORIDE SCH MG: 5 TABLET ORAL at 05:34

## 2019-10-01 RX ADMIN — DOCUSATE SODIUM SCH MG: 100 CAPSULE, LIQUID FILLED ORAL at 08:30

## 2019-10-01 NOTE — GENERAL PROGRESS NOTE
Assessment/Plan


Problem List:  


(1) Failure to thrive


SNOMED:  25108708


Qualifiers:  


   Qualified Codes:  R62.7 - Adult failure to thrive


(2) Gravely disabled


SNOMED:  011595283


(3) Acute encephalopathy


ICD Codes:  G93.40 - Encephalopathy, unspecified


SNOMED:  50199678, 133513536


(4) Encounter for generalized patient complaints


ICD Codes:  Z00.8 - Encounter for other general examination


SNOMED:  809825809


(5) Dementia with behavioral disturbance


ICD Codes:  F03.91 - Unspecified dementia with behavioral disturbance


SNOMED:  3354422123904


Status:  doing well, stable


Assessment/Plan:


64 year old male admitted from facility for acute encephalopathy, Failure to 

thrive and dementia with behavioral disturbance





1. Acute encephalopathy/ dementia with behavioral disturbance. Doubt metabolic, 

based on reviewing his labs. His renal function is at baseline and stable 


-psychiatry consult with Dr. Meyers 


-PT, patient seen ambulating independently with walker 


Case management for dc planning 





2. Obstructive uropathy with staghorn calculus.BPH.  Renal function stable. 

Resume Tamsulosin 


3. CKD stage II-III, stable. 


4. Failure to thrive. 


5. Dementia with behavioral disturbance. Oriented to self only. Resume 

outpatient psych meds. Risperidone 2mg at night per psychiatry and ativan prn 


6.HTN, uncontrolled Amlodipine 10mg, Atenolol stopped due to bradycardia. START 

lisinopril 5mg PO daily. Check BMP in 2 days. 


7. Etoh dependence in the past. Thiamine and folate 


8. Neurogenic bladder: Oxybutynin 5mg TID 





has no insurance,  involved. applied for medical. Still pending





Time of this note may not reflect time of encounter. 


Discussed with RN and patient. I spent 27 minutes on this encounter. 50% or 

more on counselling and care coordination.





Subjective


Date patient seen:  Oct 1, 2019


Constitutional:  Denies: chills, diaphoresis, fever, malaise, weakness, other


HEENT:  Denies: eye pain, blurred vision, tearing, double vision, ear pain, ear 

discharge, nose pain, nose congestion, throat pain, throat swelling, mouth pain

, mouth swelling, other


Cardiovascular:  Denies: chest pain, edema, irregular heart rate, 

lightheadedness, palpitations, syncope, other


Respiratory:  Denies: cough, orthopnea, shortness of breath, SOB with excertion

, SOB at rest, sputum, stridor, wheezing, other


Gastrointestinal/Abdominal:  Denies: abdomen distended, abdominal pain, black 

stools, tarry stools, blood in stool, constipated, diarrhea, difficulty 

swallowing, nausea, poor appetite, poor fluid intake, rectal bleeding, vomiting

, other


Genitourinary:  Denies: burning, discharge, frequency, flank pain, hematuria, 

incontinence, pain, urgency, other


Neurologic/Psychiatric:  Denies: anxiety, depressed, emotional problems, 

headache, numbness, paresthesia, pre-existing deficit, seizure, tingling, 

tremors, weakness, other


Endocrine:  Denies: excessive sweating, flushing, intolerance to cold, 

intolerance to heat, increased hunger, increased thirst, increased urine, 

unexplained weight gain, unexplained weight loss, other


Hematologic/Lymphatic:  Denies: anemia, easy bleeding, easy bruising, other


Allergies:  


Coded Allergies:  


     No Known Allergies (Unverified , 7/23/19)


Subjective


No acute events overnight. No complaints. 


HTN: blood pressure continues to be elevated 130-150s SBP. Asymptomatic. Denies 

headaches, numbness, tingling. Denies chest pain or SOB. Does not remember ever 

being on ACE-I or thiazide.





Objective





Last 24 Hour Vital Signs








  Date Time  Temp Pulse Resp B/P (MAP) Pulse Ox O2 Delivery O2 Flow Rate FiO2


 


10/1/19 20:20      Room Air  


 


10/1/19 20:00 98.5 60 20 148/70 (96) 97   


 


10/1/19 18:48    138/73    


 


10/1/19 16:00 98.7 72 17 138/73 (94) 98   


 


10/1/19 12:00 98.0 55 17 159/81 (107) 94   


 


10/1/19 09:00      Room Air  


 


10/1/19 08:30  57  154/86    


 


10/1/19 08:00 97.5 57 15 154/86 (108) 98   


 


10/1/19 04:00 98.0 71 20 133/68 (89) 99   


 


9/30/19 23:39 97.8 66 20 146/71 (96) 94   

















Intake and Output  


 


 9/30/19 10/1/19





 19:00 07:00


 


Intake Total 400 ml 1000 ml


 


Balance 400 ml 1000 ml


 


  


 


Intake Oral 400 ml 1000 ml


 


# Voids 6 4


 


# Bowel Movements  1








Laboratory Tests


10/1/19 05:01: 


Sodium Level 141, Potassium Level 4.0, Chloride Level 108H, Carbon Dioxide 

Level 25, Anion Gap 9, Blood Urea Nitrogen 23H, Creatinine 1.5H, Estimat 

Glomerular Filtration Rate 47.0, Glucose Level 96, Calcium Level 8.7


Height (Feet):  5


Height (Inches):  10.00


Weight (Pounds):  188


General Appearance:  WD/WN, no apparent distress, alert


EENT:  PERRL/EOMI


Neck:  non-tender, normal alignment


Cardiovascular:  normal peripheral pulses, normal rate, regular rhythm


Respiratory/Chest:  chest wall non-tender, lungs clear, normal breath sounds


Abdomen:  normal bowel sounds, non tender, soft


Extremities:  other - no LE edema bilaterally


Neurologic:  CNs II-XII grossly normal, no motor/sensory deficits, alert, 

oriented x 3


Skin:  normal pigmentation, warm/dry











Melvin De La Cruz D.O. Oct 1, 2019 21:55

## 2019-10-01 NOTE — NUR
CASE MANAGEMENT:REVIEW



10/1/19

SI:FTT. GRAVELY DISABLED

**URINATING ON FLOOR

97.5  57  15  154/86  98% ON RA

BUN+23   CR+1.5



IS: START DITROPAN PO Q8HRS

NORVASC PO QD

LEXAPRO PO QD

RISPERDAL PO QHS

FLOMAX PO BID

ATIVAN PO Q6HRS PRN

IVF@75/HR

HEPARIN SQ Q12

**: MED/SURG STATUS 4 EAST





PLAN:

HAVING DIFFICULTY FINDING ANY SNF THAT WILL ACCEPT THIS PATIENT BUT WILL KEEP TRYING

## 2019-10-01 NOTE — NUR
NURSE NOTES:

PT AXOX3, CALM, SITTING ON CHAIR AT BEDSIDE. PT ABLE TO AMBULATE INDEPENDENTLY WITH FWW. PT 
AMBULATED TO THE BATHROOM WITH NURSE SUPERVISION AND URINATE. PT HAS DEPENDENT BEHAVIOR. PT 
IS ABLE TO PERFORM MOST ADLs INDEPENDENTLY EXCEPT NEEDS ASSIST FOR BATHS AND PUTTING ON 
CLOTHING. PT CONTINUES TO ASK STAFF TO PERFORM ALL ADLs FOR HIM. PT HAS TO BE ENCOURAGED TO 
START ADLs INDEPENDENTLY AND EDUCATED TO PERFORM ADLs TO THE BEST OF HIS ABILITY AND STAFF 
CAN HELP WHEN PT HAS DIFFICULTY. RN HAS OBSERVED PT AMBULATE INDEPENDENTLY AND USE BATHROOM.

## 2019-10-01 NOTE — NUR
NURSE NOTES:

Patient in bed, awake, alert x 3 and verbally responsive.Periods of confusion. Abdomen is 
soft and non distended. Bowel sounds noted. No Iv site. Skin is intact, warm and dry to 
touch. No complaint of pain or discomfort noted. Respiration is even and unlabored. Bed in 
low and locked position. Kept clean and comfortable. Call light is at bedside. Will continue 
plan of care.

## 2019-10-02 VITALS — DIASTOLIC BLOOD PRESSURE: 62 MMHG | SYSTOLIC BLOOD PRESSURE: 123 MMHG

## 2019-10-02 VITALS — DIASTOLIC BLOOD PRESSURE: 76 MMHG | SYSTOLIC BLOOD PRESSURE: 132 MMHG

## 2019-10-02 VITALS — DIASTOLIC BLOOD PRESSURE: 52 MMHG | SYSTOLIC BLOOD PRESSURE: 121 MMHG

## 2019-10-02 VITALS — DIASTOLIC BLOOD PRESSURE: 60 MMHG | SYSTOLIC BLOOD PRESSURE: 151 MMHG

## 2019-10-02 VITALS — SYSTOLIC BLOOD PRESSURE: 145 MMHG | DIASTOLIC BLOOD PRESSURE: 86 MMHG

## 2019-10-02 VITALS — DIASTOLIC BLOOD PRESSURE: 99 MMHG | SYSTOLIC BLOOD PRESSURE: 130 MMHG

## 2019-10-02 RX ADMIN — OXYBUTYNIN CHLORIDE SCH MG: 5 TABLET ORAL at 13:58

## 2019-10-02 RX ADMIN — OXYBUTYNIN CHLORIDE SCH MG: 5 TABLET ORAL at 21:33

## 2019-10-02 RX ADMIN — TAMSULOSIN HYDROCHLORIDE SCH MG: 0.4 CAPSULE ORAL at 09:20

## 2019-10-02 RX ADMIN — DOCUSATE SODIUM SCH MG: 100 CAPSULE, LIQUID FILLED ORAL at 09:20

## 2019-10-02 RX ADMIN — Medication SCH MG: at 09:20

## 2019-10-02 RX ADMIN — OXYBUTYNIN CHLORIDE SCH MG: 5 TABLET ORAL at 05:39

## 2019-10-02 RX ADMIN — HEPARIN SODIUM SCH UNITS: 5000 INJECTION INTRAVENOUS; SUBCUTANEOUS at 09:00

## 2019-10-02 RX ADMIN — LISINOPRIL SCH MG: 2.5 TABLET ORAL at 09:21

## 2019-10-02 RX ADMIN — TAMSULOSIN HYDROCHLORIDE SCH MG: 0.4 CAPSULE ORAL at 17:33

## 2019-10-02 NOTE — NUR
****DISCHARGE PLANNING

SOMEONE FROM [facility] AND [facility] ARE SUPPOSE TO COME OUT AND SEE PATIENT TODAY 
AND EVALUATE APPROPRIATENESS FOR THEIR FACILITIES

-------------------------------------------------------------------------------

Addendum: 10/02/19 at 1451 by CLARISSE HANKS LVN LVN

-------------------------------------------------------------------------------

REFERRED TO

[facility]

T: [phone]

RECEIVED CALL BACK FROM 

[facility] ([phone]) AT

[address] [address]

**CALLED [facility] AND SPOKE WITH CARLOS BURGER AND PROVIDED HER WITH [facility]'S CONTACT 
INFORMATION

SO AUTHORIZATION CAN BE GENERATED

## 2019-10-02 NOTE — NUR
CASE MANAGEMENT:REVIEW



10/2/19

SI:FTT. GRAVELY DISABLED

**LEAKING URINE ON FLOOR

98.2   61  18  151/60  96% ON RA



IS: DITROPAN PO Q8HRS

LISINOPRIL PO QD

NORVASC PO QD

LEXAPRO PO QD

RISPERDAL PO QHS

FLOMAX PO BID

ATIVAN PO Q6HRS PRN

HEPARIN SQ Q12

**: MED/SURG STATUS 4 EAST





PLAN:

HAVING DIFFICULTY FINDING ANY SNF THAT WILL ACCEPT THIS PATIENT BUT WILL KEEP TRYING

## 2019-10-02 NOTE — GENERAL PROGRESS NOTE
Assessment/Plan


Problem List:  


(1) Failure to thrive


SNOMED:  45456972


Qualifiers:  


   Qualified Codes:  R62.7 - Adult failure to thrive


(2) Gravely disabled


SNOMED:  836929341


(3) Acute encephalopathy


ICD Codes:  G93.40 - Encephalopathy, unspecified


SNOMED:  19587465, 451571781


(4) Encounter for generalized patient complaints


ICD Codes:  Z00.8 - Encounter for other general examination


SNOMED:  528503788


(5) Dementia with behavioral disturbance


ICD Codes:  F03.91 - Unspecified dementia with behavioral disturbance


SNOMED:  0731778921424


Status:  doing well, stable


Assessment/Plan:


64 year old male admitted from facility for acute encephalopathy, Failure to 

thrive and dementia with behavioral disturbance





1. Acute encephalopathy/ dementia with behavioral disturbance. Doubt metabolic, 

based on reviewing his labs. His renal function is at baseline and stable 


-psychiatry consult with Dr. Meyers 


-PT, patient seen ambulating independently with walker 


Case management for dc planning: Still pending placement





2. Obstructive uropathy with staghorn calculus.BPH.  Renal function stable. 

Resume Tamsulosin 


3. CKD stage II-III, stable. 


4. Failure to thrive. 


5. Dementia with behavioral disturbance. Oriented to self only. Resume 

outpatient psych meds. Risperidone 2mg at night per psychiatry and ativan prn 


6.HTN, uncontrolled Amlodipine 10mg, Atenolol stopped due to bradycardia. 

Continue lisinopril 5mg PO daily. Check BMP in 1 days. 


7. Etoh dependence in the past. Thiamine and folate 


8. Neurogenic bladder: Oxybutynin 5mg TID 





has no insurance,  involved. applied for medical. Still pending





Time of this note may not reflect time of encounter. 


Discussed with RN and patient. I spent 28 minutes on this encounter. 50% or 

more on counselling and care coordination.





Subjective


Constitutional:  Denies: chills, diaphoresis, fever, malaise, weakness, other


HEENT:  Denies: eye pain, blurred vision, tearing, double vision, ear pain, ear 

discharge, nose pain, nose congestion, throat pain, throat swelling, mouth pain

, mouth swelling, other


Cardiovascular:  Denies: chest pain, edema, irregular heart rate, 

lightheadedness, palpitations, syncope, other


Respiratory:  Denies: cough, orthopnea, shortness of breath, SOB with excertion

, SOB at rest, sputum, stridor, wheezing, other


Gastrointestinal/Abdominal:  Denies: abdomen distended, abdominal pain, black 

stools, tarry stools, blood in stool, constipated, diarrhea, difficulty 

swallowing, nausea, poor appetite, poor fluid intake, rectal bleeding, vomiting

, other


Genitourinary:  Denies: burning, discharge, frequency, flank pain, hematuria, 

incontinence, pain, urgency, other


Neurologic/Psychiatric:  Denies: anxiety, depressed, emotional problems, 

headache, numbness, paresthesia, pre-existing deficit, seizure, tingling, 

tremors, weakness, other


Endocrine:  Denies: excessive sweating, flushing, intolerance to cold, 

intolerance to heat, increased hunger, increased thirst, increased urine, 

unexplained weight gain, unexplained weight loss, other


Hematologic/Lymphatic:  Denies: anemia, easy bleeding, easy bruising, other


Allergies:  


Coded Allergies:  


     No Known Allergies (Unverified , 7/23/19)


Subjective


No acute events overnight. No complaints. 


HTN: Blood pressure down to 121/52. Tolerating lisinopril. No side effects so 

far. Denies headaches, numbness, tingling. Denies chest pain or SOB.





Objective





Last 24 Hour Vital Signs








  Date Time  Temp Pulse Resp B/P (MAP) Pulse Ox O2 Delivery O2 Flow Rate FiO2


 


10/2/19 12:00 97.7 60 17 145/86 (105) 99   


 


10/2/19 09:21    151/60    


 


10/2/19 09:20  61  151/60    


 


10/2/19 09:00      Room Air  


 


10/2/19 08:00 98.2 61 18 151/60 (90) 96   


 


10/2/19 04:00 97.9 52 20 121/52 (75) 100   


 


10/2/19 00:00 98.5 52 20 132/76 (94) 96   


 


10/1/19 20:20      Room Air  


 


10/1/19 20:00 98.5 60 20 148/70 (96) 97   


 


10/1/19 18:48    138/73    

















Intake and Output  


 


 10/1/19 10/2/19





 19:00 07:00


 


Intake Total 600 ml 


 


Balance 600 ml 


 


  


 


Intake Oral 600 ml 


 


# Voids 4 2








Height (Feet):  5


Height (Inches):  10.00


Weight (Pounds):  193


General Appearance:  WD/WN, no apparent distress


EENT:  PERRL/EOMI


Neck:  non-tender, normal alignment, supple


Cardiovascular:  normal peripheral pulses, normal rate, regular rhythm


Respiratory/Chest:  chest wall non-tender, lungs clear, normal breath sounds


Abdomen:  normal bowel sounds, non tender, soft


Extremities:  other - no LE edema bilaterally


Neurologic:  CNs II-XII grossly normal, no motor/sensory deficits, alert, 

oriented x 3


Skin:  normal pigmentation, warm/dry











Melvin De La Cruz D.O. Oct 2, 2019 16:15

## 2019-10-02 NOTE — NUR
NURSE NOTES:

PT AXOX3, AMBULATES WITH WALKER. PT HAS TO BE REMINDED AT TIMES TO USE WALKER. PT FOLLOWS 
DIRECTION WELL. IN NO APPARENT DISTRESS AT THIS TIME. WILL CONTINUE TO MONITOR.

## 2019-10-02 NOTE — NUR
NURSE NOTES:

Patient is in bed, awake and alert x3. Able to make needs known. Ambulates with walker. No 
IV access - MD aware. On room air with no signs of distress or SOB. Bed locked and in lowest 
position. Call light in reach. Will continue to monitor the patient.

## 2019-10-03 VITALS — SYSTOLIC BLOOD PRESSURE: 124 MMHG | DIASTOLIC BLOOD PRESSURE: 75 MMHG

## 2019-10-03 VITALS — DIASTOLIC BLOOD PRESSURE: 75 MMHG | SYSTOLIC BLOOD PRESSURE: 135 MMHG

## 2019-10-03 VITALS — SYSTOLIC BLOOD PRESSURE: 122 MMHG | DIASTOLIC BLOOD PRESSURE: 78 MMHG

## 2019-10-03 VITALS — DIASTOLIC BLOOD PRESSURE: 68 MMHG | SYSTOLIC BLOOD PRESSURE: 130 MMHG

## 2019-10-03 VITALS — SYSTOLIC BLOOD PRESSURE: 146 MMHG | DIASTOLIC BLOOD PRESSURE: 82 MMHG

## 2019-10-03 VITALS — SYSTOLIC BLOOD PRESSURE: 135 MMHG | DIASTOLIC BLOOD PRESSURE: 78 MMHG

## 2019-10-03 LAB
ADD MANUAL DIFF: NO
ANION GAP SERPL CALC-SCNC: 9 MMOL/L (ref 5–15)
BASOPHILS NFR BLD AUTO: 1.4 % (ref 0–2)
BUN SERPL-MCNC: 22 MG/DL (ref 7–18)
CALCIUM SERPL-MCNC: 8.7 MG/DL (ref 8.5–10.1)
CHLORIDE SERPL-SCNC: 107 MMOL/L (ref 98–107)
CO2 SERPL-SCNC: 25 MMOL/L (ref 21–32)
CREAT SERPL-MCNC: 1.6 MG/DL (ref 0.55–1.3)
EOSINOPHIL NFR BLD AUTO: 9.7 % (ref 0–3)
ERYTHROCYTE [DISTWIDTH] IN BLOOD BY AUTOMATED COUNT: 14.2 % (ref 11.6–14.8)
HCT VFR BLD CALC: 36.6 % (ref 42–52)
HGB BLD-MCNC: 12.2 G/DL (ref 14.2–18)
LYMPHOCYTES NFR BLD AUTO: 34.2 % (ref 20–45)
MCV RBC AUTO: 90 FL (ref 80–99)
MONOCYTES NFR BLD AUTO: 10.7 % (ref 1–10)
NEUTROPHILS NFR BLD AUTO: 44 % (ref 45–75)
PLATELET # BLD: 134 K/UL (ref 150–450)
POTASSIUM SERPL-SCNC: 3.9 MMOL/L (ref 3.5–5.1)
RBC # BLD AUTO: 4.08 M/UL (ref 4.7–6.1)
SODIUM SERPL-SCNC: 141 MMOL/L (ref 136–145)
WBC # BLD AUTO: 5 K/UL (ref 4.8–10.8)

## 2019-10-03 RX ADMIN — OXYBUTYNIN CHLORIDE SCH MG: 5 TABLET ORAL at 13:30

## 2019-10-03 RX ADMIN — TAMSULOSIN HYDROCHLORIDE SCH MG: 0.4 CAPSULE ORAL at 17:37

## 2019-10-03 RX ADMIN — Medication SCH MG: at 08:39

## 2019-10-03 RX ADMIN — LISINOPRIL SCH MG: 2.5 TABLET ORAL at 08:38

## 2019-10-03 RX ADMIN — TAMSULOSIN HYDROCHLORIDE SCH MG: 0.4 CAPSULE ORAL at 08:38

## 2019-10-03 RX ADMIN — OXYBUTYNIN CHLORIDE SCH MG: 5 TABLET ORAL at 05:14

## 2019-10-03 RX ADMIN — OXYBUTYNIN CHLORIDE SCH MG: 5 TABLET ORAL at 21:03

## 2019-10-03 NOTE — NUR
*-* INSURANCE *-*



UPDATED CLINICALS AND REVIEWS HAVE BEEN FAXED TO:



[facility]:JENNYFER GRIER

P: [phone]

F: [phone]

## 2019-10-03 NOTE — NUR
***DISCHARGE PLANNING



PATIENT HAS BEEN ACCEPTED AT



[facility]

[address] [address]



INSURANCE COMPANY IS IN THE PROCESS OF PROVIDING AUTHORIZATION AND LETTER OF AGREEMENT

ONCE SNF RECEIVES THEIR AUTHORIZATION THEY WILL CONTACT THIS  AND PROVIDE ROOM 
NUMBER

## 2019-10-03 NOTE — NUR
RD ASSESSMENT & RECOMMENDATIONS

SEE CARE ACTIVITY FOR COMPLETE ASSESSMENT



DAILY ESTIMATED NEEDS:

Needs based on Cardiac 80kg adj  

25-30  kcals/kg 

9565-4816  total kcals

1-1.2  g protein/kg

80-96  g total protein 

25-30  mL/kg

4360-6408  total fluid mLs



NUTRITION DIAGNOSIS:

Altered nutrition related lab values r/t clinical status as evidenced by

elev Creat(1.4-> now wnl), elev AST, elev lipase (446), elev BP (154/93

-> wnl at this time).



CURRENT DIET:Regular     





PO DIET RECOMMENDATIONS:

LOW NA diet / soft easy chew  





ADDITIONAL RECOMMENDATIONS:

1) Obtain a standing weight as able / or calibrated bed scale wt 

2) Updated BMP as able, monitor lytes, replete as needed 

3) Snacks BID in b/w meals  

4) H/o C-diff, rec probiotics 

    Hold COLACE with diarrhea

## 2019-10-03 NOTE — GENERAL PROGRESS NOTE
Assessment/Plan


Problem List:  


(1) Failure to thrive


SNOMED:  28337512


Qualifiers:  


   Qualified Codes:  R62.7 - Adult failure to thrive


(2) Gravely disabled


SNOMED:  827304888


(3) Acute encephalopathy


ICD Codes:  G93.40 - Encephalopathy, unspecified


SNOMED:  49559779, 296559020


(4) Encounter for generalized patient complaints


ICD Codes:  Z00.8 - Encounter for other general examination


SNOMED:  710064526


(5) Dementia with behavioral disturbance


ICD Codes:  F03.91 - Unspecified dementia with behavioral disturbance


SNOMED:  5545839144168


Status:  doing well, stable


Assessment/Plan:


64 year old male admitted from facility for acute encephalopathy, Failure to 

thrive and dementia with behavioral disturbance





1. Acute encephalopathy/ dementia with behavioral disturbance. Doubt metabolic, 

based on reviewing his labs. His renal function is at baseline and stable 


-psychiatry consult with Dr. Meyers 


-PT, patient seen ambulating independently with walker 


Case management for dc planning: Still pending placement





2. Obstructive uropathy with staghorn calculus.BPH.  Renal function stable. 

Resume Tamsulosin 


3. CKD stage II-III, stable. 


4. Failure to thrive. 


5. Dementia with behavioral disturbance. Oriented to self only. Resume 

outpatient psych meds. Risperidone 2mg at night per psychiatry and ativan prn 


6.HTN, controlled


-Amlodipine 10mg, Atenolol stopped due to bradycardia. Continue lisinopril 5mg 

PO daily. Recheck BMP in one week


7. Etoh dependence in the past. Thiamine and folate 


8. Neurogenic bladder: Oxybutynin 5mg TID 





has no insurance,  involved. applied for medical. Still pending





Time of this note may not reflect time of encounter. 


Discussed with RN and patient. I spent 27 minutes on this encounter. 50% or 

more on counselling and care coordination.





Subjective


Date patient seen:  Oct 3, 2019


Constitutional:  Denies: chills, diaphoresis, fever, malaise, weakness, other


HEENT:  Denies: eye pain, blurred vision, tearing, double vision, ear pain, ear 

discharge, nose pain, nose congestion, throat pain, throat swelling, mouth pain

, mouth swelling, other


Cardiovascular:  Denies: chest pain, edema, irregular heart rate, 

lightheadedness, palpitations, syncope, other


Respiratory:  Denies: cough, orthopnea, shortness of breath, SOB with excertion

, SOB at rest, sputum, stridor, wheezing, other


Gastrointestinal/Abdominal:  Denies: abdomen distended, abdominal pain, black 

stools, tarry stools, blood in stool, constipated, diarrhea, difficulty 

swallowing, nausea, poor appetite, poor fluid intake, rectal bleeding, vomiting

, other


Genitourinary:  Denies: burning, discharge, frequency, flank pain, hematuria, 

incontinence, pain, urgency, other


Neurologic/Psychiatric:  Denies: anxiety, depressed, emotional problems, 

headache, numbness, paresthesia, pre-existing deficit, seizure, tingling, 

tremors, weakness, other


Endocrine:  Denies: excessive sweating, flushing, intolerance to cold, 

intolerance to heat, increased hunger, increased thirst, increased urine, 

unexplained weight gain, unexplained weight loss, other


Hematologic/Lymphatic:  Denies: anemia, easy bleeding, easy bruising, other


Allergies:  


Coded Allergies:  


     No Known Allergies (Unverified , 7/23/19)


Subjective


No acute events overnight per nursing. No complaints. 


HTN: Blood pressure improved to 122/78. Tolerating lisinopril. No side effects 

so far. Denies headaches, numbness, tingling. Denies chest pain or SOB.





Objective





Last 24 Hour Vital Signs








  Date Time  Temp Pulse Resp B/P (MAP) Pulse Ox O2 Delivery O2 Flow Rate FiO2


 


10/3/19 12:00 98.1 66 18 146/82 (103) 95   


 


10/3/19 09:06      Room Air  


 


10/3/19 08:39  56  122/78    


 


10/3/19 08:38    122/78    


 


10/3/19 08:00 98.3 56 19 122/78 (93) 99   


 


10/3/19 04:00 98.1 53 20 124/75 (91) 99   


 


10/3/19 00:00 98.1 53 18 130/68 (88) 94   


 


10/2/19 20:21      Room Air  


 


10/2/19 20:00 98.2 59 20 123/62 (82) 96   

















Intake and Output  


 


 10/2/19 10/3/19





 19:00 07:00


 


Intake Total 800 ml 


 


Output Total  300 ml


 


Balance 800 ml -300 ml


 


  


 


Other 800 ml 


 


Output Urine Total  300 ml


 


# Voids  2








Laboratory Tests


10/3/19 05:30: 


White Blood Count 5.0, Red Blood Count 4.08L, Hemoglobin 12.2L, Hematocrit 36.6L

, Mean Corpuscular Volume 90, Mean Corpuscular Hemoglobin 29.9, Mean 

Corpuscular Hemoglobin Concent 33.4, Red Cell Distribution Width 14.2, Platelet 

Count 134L, Mean Platelet Volume 6.7, Neutrophils (%) (Auto) 44.0L, Lymphocytes 

(%) (Auto) 34.2, Monocytes (%) (Auto) 10.7H, Eosinophils (%) (Auto) 9.7H, 

Basophils (%) (Auto) 1.4, Sodium Level 141, Potassium Level 3.9, Chloride Level 

107, Carbon Dioxide Level 25, Anion Gap 9, Blood Urea Nitrogen 22H, Creatinine 

1.6H, Estimat Glomerular Filtration Rate 43.6, Glucose Level 113H, Calcium 

Level 8.7


Height (Feet):  5


Height (Inches):  10.00


Weight (Pounds):  193


General Appearance:  WD/WN, no apparent distress, alert


EENT:  PERRL/EOMI, normal ENT inspection


Neck:  non-tender, normal alignment, supple


Cardiovascular:  normal peripheral pulses, normal rate, regular rhythm


Respiratory/Chest:  chest wall non-tender, lungs clear


Abdomen:  normal bowel sounds, non tender, soft, no organomegaly, no mass


Extremities:  normal range of motion, non-tender, normal inspection


Neurologic:  CNs II-XII grossly normal, no motor/sensory deficits, abnormal gait

, alert, oriented x 3


Skin:  normal pigmentation, warm/dry











Melvin De La Cruz D.O. Oct 3, 2019 16:23

## 2019-10-03 NOTE — NUR
CASE MANAGEMENT:REVIEW



10/3/19

SI:FTT. GRAVELY DISABLED

**LEAKING URINE ON FLOOR

98.1    66  18  146/82   95% ON RA

BUN+22   CR+1.6  



IS: DITROPAN PO Q8HRS

LISINOPRIL PO QD

NORVASC PO QD

LEXAPRO PO QD

RISPERDAL PO QHS

FLOMAX PO BID

ATIVAN PO Q6HRS PRN

HEPARIN SQ Q12

**: MED/SURG STATUS 4 [facility]





PLAN:

## 2019-10-03 NOTE — NUR
NURSE NOTES:

Pt resting in bed. denies pain, no SOB noted, VS stable, bed alarm on, call light within 
reach. fall precaution maintained. Will continue to monitor. 

-------------------------------------------------------------------------------

Addendum: 10/03/19 at 1427 by Anitha Acosta RN

-------------------------------------------------------------------------------

pt has no IV site, MD aware.

## 2019-10-03 NOTE — NUR
NURSE NOTES:

Received report from SALONI Welsh. Patient is in bed, awake and alert x3. Able to make needs 
known. Ambulates with walker. No IV access - MD aware. On room air with no signs of distress 
or SOB. Bed locked and in lowest position. Call light in reach. Will continue to monitor the 
patient.

## 2019-10-04 VITALS — DIASTOLIC BLOOD PRESSURE: 79 MMHG | SYSTOLIC BLOOD PRESSURE: 121 MMHG

## 2019-10-04 VITALS — SYSTOLIC BLOOD PRESSURE: 133 MMHG | DIASTOLIC BLOOD PRESSURE: 62 MMHG

## 2019-10-04 VITALS — SYSTOLIC BLOOD PRESSURE: 119 MMHG | DIASTOLIC BLOOD PRESSURE: 72 MMHG

## 2019-10-04 VITALS — SYSTOLIC BLOOD PRESSURE: 135 MMHG | DIASTOLIC BLOOD PRESSURE: 78 MMHG

## 2019-10-04 VITALS — DIASTOLIC BLOOD PRESSURE: 76 MMHG | SYSTOLIC BLOOD PRESSURE: 121 MMHG

## 2019-10-04 VITALS — DIASTOLIC BLOOD PRESSURE: 46 MMHG | SYSTOLIC BLOOD PRESSURE: 100 MMHG

## 2019-10-04 RX ADMIN — Medication SCH MG: at 10:13

## 2019-10-04 RX ADMIN — OXYBUTYNIN CHLORIDE SCH MG: 5 TABLET ORAL at 21:36

## 2019-10-04 RX ADMIN — LISINOPRIL SCH MG: 2.5 TABLET ORAL at 10:14

## 2019-10-04 RX ADMIN — OXYBUTYNIN CHLORIDE SCH MG: 5 TABLET ORAL at 14:41

## 2019-10-04 RX ADMIN — OXYBUTYNIN CHLORIDE SCH MG: 5 TABLET ORAL at 05:03

## 2019-10-04 RX ADMIN — TAMSULOSIN HYDROCHLORIDE SCH MG: 0.4 CAPSULE ORAL at 10:14

## 2019-10-04 NOTE — GENERAL PROGRESS NOTE
Assessment/Plan


Problem List:  


(1) Failure to thrive


SNOMED:  44137151


Qualifiers:  


   Qualified Codes:  R62.7 - Adult failure to thrive


(2) Gravely disabled


SNOMED:  685357851


(3) Acute encephalopathy


ICD Codes:  G93.40 - Encephalopathy, unspecified


SNOMED:  49808665, 163866474


(4) Encounter for generalized patient complaints


ICD Codes:  Z00.8 - Encounter for other general examination


SNOMED:  058196784


(5) Dementia with behavioral disturbance


ICD Codes:  F03.91 - Unspecified dementia with behavioral disturbance


SNOMED:  0570549187933


Status:  doing well, stable


Assessment/Plan:


64 year old male admitted from facility for acute encephalopathy, Failure to 

thrive and dementia with behavioral disturbance





1. Acute encephalopathy/ dementia with behavioral disturbance. Doubt metabolic, 

based on reviewing his labs. His renal function is at baseline and stable 


-psychiatry consult with Dr. Meyers 


-PT, patient seen ambulating independently with walker 


Case management for dc planning: Still pending placement





2. Obstructive uropathy with staghorn calculus.BPH.  Renal function stable. 

Resume Tamsulosin 


3. CKD stage II-III, stable. 


4. Failure to thrive. 


5. Dementia with behavioral disturbance. Oriented to self only. Resume 

outpatient psych meds. Risperidone 2mg at night per psychiatry and ativan prn 


6.HTN, controlled


-Amlodipine 10mg, Atenolol stopped due to bradycardia. Continue lisinopril 5mg 

PO daily. Recheck BMP in one week


7. Etoh dependence in the past. Thiamine and folate 


8. Neurogenic bladder: Oxybutynin 5mg TID 





has no insurance,  involved. applied for medical. Still pending





Time of this note may not reflect time of encounter. 


Discussed with RN and patient. I spent 26 minutes on this encounter. 50% or 

more on counselling and care coordination.





Subjective


Date patient seen:  Oct 4, 2019


Constitutional:  Denies: chills, diaphoresis, fever, malaise, weakness, other


HEENT:  Denies: eye pain, blurred vision, tearing, double vision, ear pain, ear 

discharge, nose pain, nose congestion, throat pain, throat swelling, mouth pain

, mouth swelling, other


Cardiovascular:  Denies: chest pain, edema, irregular heart rate, 

lightheadedness, palpitations, syncope, other


Respiratory:  Denies: cough, orthopnea, shortness of breath, SOB with excertion

, SOB at rest, sputum, stridor, wheezing, other


Gastrointestinal/Abdominal:  Denies: abdomen distended, abdominal pain, black 

stools, tarry stools, blood in stool, constipated, diarrhea, difficulty 

swallowing, nausea, poor appetite, poor fluid intake, rectal bleeding, vomiting

, other


Genitourinary:  Denies: burning, discharge, frequency, flank pain, hematuria, 

incontinence, pain, urgency, other


Neurologic/Psychiatric:  Denies: anxiety, depressed, emotional problems, 

headache, numbness, paresthesia, pre-existing deficit, seizure, tingling, 

tremors, weakness, other


Endocrine:  Denies: excessive sweating, flushing, intolerance to cold, 

intolerance to heat, increased hunger, increased thirst, increased urine, 

unexplained weight gain, unexplained weight loss, other


Hematologic/Lymphatic:  Denies: anemia, easy bleeding, easy bruising, other


Allergies:  


Coded Allergies:  


     No Known Allergies (Unverified , 7/23/19)


Subjective


No acute events overnight per nursing. No complaints. 


HTN: Blood pressure improved to SBP 120s. Tolerating lisinopril. No side 

effects so far. Denies headaches, numbness, tingling. Denies chest pain or SOB.





Objective





Last 24 Hour Vital Signs








  Date Time  Temp Pulse Resp B/P (MAP) Pulse Ox O2 Delivery O2 Flow Rate FiO2


 


10/4/19 16:00 98.1 76 18 121/79 (93) 98   


 


10/4/19 12:00 97.8 79 18 119/72 (88) 97   


 


10/4/19 10:14    121/76    


 


10/4/19 10:14  78  121/76    


 


10/4/19 09:00      Room Air  


 


10/4/19 08:00 98.4 76 18 121/76 (91) 97   


 


10/4/19 04:00 98.4 73 19 135/78 (97) 96   


 


10/4/19 00:00 98.5 77 20 100/46 (64) 98   


 


10/3/19 20:03      Room Air  


 


10/3/19 20:00 97.4 66 20 135/75 (95) 97   

















Intake and Output  


 


 10/3/19 10/4/19





 18:59 06:59


 


Intake Total 400 ml 240 ml


 


Balance 400 ml 240 ml


 


  


 


Intake Oral 400 ml 240 ml


 


# Voids 3 4


 


# Bowel Movements 1 1








Height (Feet):  5


Height (Inches):  10.00


Weight (Pounds):  193


General Appearance:  WD/WN, no apparent distress, alert


EENT:  PERRL/EOMI, normal ENT inspection


Neck:  non-tender, normal alignment, supple


Cardiovascular:  normal peripheral pulses, normal rate, regular rhythm


Respiratory/Chest:  chest wall non-tender, lungs clear, normal breath sounds


Abdomen:  normal bowel sounds, non tender, soft


Extremities:  normal range of motion, non-tender


Neurologic:  CNs II-XII grossly normal, no motor/sensory deficits, alert, 

oriented x 3


Skin:  normal pigmentation, warm/dry











Melvin De La Cruz D.O. Oct 4, 2019 17:37

## 2019-10-04 NOTE — NUR
*****DISCHARGE PLANNING



STILL WAITING FOR LETTER OF AGREEMENT TO BE SENT TO SNF IN [place]

CALLED [facility]  [facility]EN[facility] AND LEFT A MESSAGE REQUESTING A UPDATE

[facility]

[facility]:JENNYFER GRIER

T: [phone]

## 2019-10-04 NOTE — NUR
NURSE NOTES:

Received report from SALONI Dan. Patient awake, sitting in  bed, eating breakfast. On room 
air, no signs of distress or labored breathing. No IV, MD aware. Bed in lowest position with 
call light in reach.

## 2019-10-04 NOTE — NUR
NURSE NOTES:

Patient is in bed, awake and alert x3 and verbal.  Ambulates with walker. Breathing  on room 
air, no signs of distress or SOB noted. Bed locked and in lowest position. Bed alarm is on. 
Call light in reach.Patient will be monitored. Bed alarm on. Pt instructed to call before 
getting out of bed. 



Trainee, Minna TrejoRN will be assisting in the care of this patient.

## 2019-10-04 NOTE — NUR
CASE MANAGEMENT:REVIEW



10/4/19

SI:FTT. GRAVELY DISABLED

**LEAKING URINE ON FLOOR

98.1    66  18  146/82   95% ON RA

BUN+22   CR+1.6  



IS: DITROPAN PO Q8HRS

LISINOPRIL PO QD

NORVASC PO QD

LEXAPRO PO QD

RISPERDAL PO QHS

FLOMAX PO BID

ATIVAN PO Q6HRS PRN

HEPARIN SQ Q12

**: MED/SURG STATUS 4 [facility]





PLAN:

## 2019-10-04 NOTE — NUR
NURSE NOTES:

IV access established on the left AC 22G. Patient tolerated well. 

-------------------------------------------------------------------------------

Addendum: 10/05/19 at 0511 by NIKUNJ PASCAL RN RN

-------------------------------------------------------------------------------

Disregard above note; wrong patient.

## 2019-10-04 NOTE — NUR
NURSE NOTES:

Patient is in bed, awake and alert x3 and verbal.  Ambulates with walker. Breathing  on room 
air with no signs of distress or SOB noted. . Bed locked and in lowest position. Bed alarm 
is on. Call light in reach.Patient will be monitored..

## 2019-10-05 VITALS — SYSTOLIC BLOOD PRESSURE: 132 MMHG | DIASTOLIC BLOOD PRESSURE: 80 MMHG

## 2019-10-05 VITALS — SYSTOLIC BLOOD PRESSURE: 120 MMHG | DIASTOLIC BLOOD PRESSURE: 67 MMHG

## 2019-10-05 VITALS — SYSTOLIC BLOOD PRESSURE: 128 MMHG | DIASTOLIC BLOOD PRESSURE: 67 MMHG

## 2019-10-05 VITALS — DIASTOLIC BLOOD PRESSURE: 76 MMHG | SYSTOLIC BLOOD PRESSURE: 119 MMHG

## 2019-10-05 VITALS — SYSTOLIC BLOOD PRESSURE: 126 MMHG | DIASTOLIC BLOOD PRESSURE: 71 MMHG

## 2019-10-05 VITALS — DIASTOLIC BLOOD PRESSURE: 85 MMHG | SYSTOLIC BLOOD PRESSURE: 148 MMHG

## 2019-10-05 RX ADMIN — OXYBUTYNIN CHLORIDE SCH MG: 5 TABLET ORAL at 05:46

## 2019-10-05 RX ADMIN — OXYBUTYNIN CHLORIDE SCH MG: 5 TABLET ORAL at 21:47

## 2019-10-05 RX ADMIN — OXYBUTYNIN CHLORIDE SCH MG: 5 TABLET ORAL at 14:16

## 2019-10-05 RX ADMIN — LISINOPRIL SCH MG: 2.5 TABLET ORAL at 09:44

## 2019-10-05 NOTE — GENERAL PROGRESS NOTE
Assessment/Plan


Problem List:  


(1) Failure to thrive


SNOMED:  12951192


Qualifiers:  


   Qualified Codes:  R62.7 - Adult failure to thrive


(2) Gravely disabled


SNOMED:  067213735


(3) Acute encephalopathy


ICD Codes:  G93.40 - Encephalopathy, unspecified


SNOMED:  23112869, 044816623


(4) Encounter for generalized patient complaints


ICD Codes:  Z00.8 - Encounter for other general examination


SNOMED:  300633523


(5) Dementia with behavioral disturbance


ICD Codes:  F03.91 - Unspecified dementia with behavioral disturbance


SNOMED:  2034577943751


Status:  doing well, stable


Assessment/Plan:


64 year old male admitted from facility for acute encephalopathy, Failure to 

thrive and dementia with behavioral disturbance





1. Acute encephalopathy/ dementia with behavioral disturbance. Doubt metabolic, 

based on reviewing his labs. His renal function is at baseline and stable 


-psychiatry consult with Dr. Meyers 


-PT, patient seen ambulating independently with walker 


Case management for dc planning: Still pending placement





2. Obstructive uropathy with staghorn calculus.BPH.  Renal function stable. 

Resume Tamsulosin 


3. CKD stage II-III, stable. 


4. Failure to thrive. 


5. Dementia with behavioral disturbance. Oriented to self only. Resume 

outpatient psych meds. Risperidone 2mg at night per psychiatry and ativan prn 


6.HTN, controlled


-Amlodipine 10mg, Atenolol stopped due to bradycardia. Continue lisinopril 5mg 

PO daily. Recheck BMP in one week


7. Etoh dependence in the past. Thiamine and folate 


8. Neurogenic bladder: Oxybutynin 5mg TID 





has no insurance,  involved. applied for medical. Still pending





Time of this note may not reflect time of encounter. 


Discussed with RN and patient. I spent 27 minutes on this encounter. 50% or 

more on counselling and care coordination.





Subjective


Date patient seen:  Oct 5, 2019


Constitutional:  Denies: chills, diaphoresis, fever, malaise, weakness, other


HEENT:  Denies: eye pain, blurred vision, tearing, double vision, ear pain, ear 

discharge, nose pain, nose congestion, throat pain, throat swelling, mouth pain

, mouth swelling, other


Cardiovascular:  Denies: chest pain, edema, irregular heart rate, 

lightheadedness, palpitations, syncope, other


Respiratory:  Denies: cough, orthopnea, shortness of breath, SOB with excertion

, SOB at rest, sputum, stridor, wheezing, other


Gastrointestinal/Abdominal:  Denies: abdomen distended, abdominal pain, black 

stools, tarry stools, blood in stool, constipated, diarrhea, difficulty 

swallowing, nausea, poor appetite, poor fluid intake, rectal bleeding, vomiting

, other


Genitourinary:  Denies: burning, discharge, frequency, flank pain, hematuria, 

incontinence, pain, urgency, other


Neurologic/Psychiatric:  Denies: anxiety, depressed, emotional problems, 

headache, numbness, paresthesia, pre-existing deficit, seizure, tingling, 

tremors, weakness, other


Endocrine:  Denies: excessive sweating, flushing, intolerance to cold, 

intolerance to heat, increased hunger, increased thirst, increased urine, 

unexplained weight gain, unexplained weight loss, other


Hematologic/Lymphatic:  Denies: anemia, easy bleeding, easy bruising, other


Allergies:  


Coded Allergies:  


     No Known Allergies (Unverified , 7/23/19)


Subjective


No acute events overnight per nursing. No complaints. Feels about the same


HTN: Blood pressure improved to SBP 120s. Tolerating lisinopril. No side 

effects so far. Denies headaches, numbness, tingling. Denies chest pain or SOB.





Objective





Last 24 Hour Vital Signs








  Date Time  Temp Pulse Resp B/P (MAP) Pulse Ox O2 Delivery O2 Flow Rate FiO2


 


10/5/19 09:44    119/76    


 


10/5/19 09:00      Room Air  


 


10/5/19 08:00 98.6 77 18 119/76 (90) 98   


 


10/5/19 04:00 98.4 76 18 120/67 (84) 98   


 


10/5/19 00:00 97.8 56 18 128/67 (87) 97   


 


10/4/19 21:00      Room Air  


 


10/4/19 20:00 98.2 54 16 133/62 (85) 98   


 


10/4/19 16:00 98.1 76 18 121/79 (93) 98   

















Intake and Output  


 


 10/4/19 10/5/19





 18:59 06:59


 


Intake Total 900 ml 


 


Balance 900 ml 


 


  


 


Other 900 ml 


 


# Voids  4








Height (Feet):  5


Height (Inches):  10.00


Weight (Pounds):  193


General Appearance:  WD/WN, no apparent distress, alert


EENT:  PERRL/EOMI, normal ENT inspection, TMs normal


Neck:  non-tender, normal alignment, supple


Cardiovascular:  normal peripheral pulses, normal rate, regular rhythm


Respiratory/Chest:  chest wall non-tender, lungs clear, normal breath sounds


Abdomen:  normal bowel sounds, non tender, soft


Extremities:  normal range of motion, non-tender, normal inspection


Skin:  normal pigmentation, warm/dry











Melvin De La Cruz D.O. Oct 5, 2019 13:30

## 2019-10-05 NOTE — NUR
NURSE NOTES:

Patient is in bed, awake and verbal. Pt is forgetful and confused .  Ambulates with walker. 
Breathing  on room air with no signs of distress or SOB noted. . Bed locked and in lowest 
position. Bed alarm is on. Call light in reach.Patient will be monitored.Bed alarm on. Pt is 
awaiting SNF placement. 



Trainee, Minna Trejo RN will be assisting in the care of this patient.

## 2019-10-05 NOTE — NUR
NURSE NOTES:

Patient is in bed, asleep. Pt is alert, forgetful and verbally able to needs known.  
Ambulates with walker. Breathing  on room air with no signs of distress or SOB noted. . Bed 
locked and in lowest position. Bed alarm is on. Call light in reach.Patient will be 
monitored.. 

-------------------------------------------------------------------------------

Addendum: 10/06/19 at 0233 by Lois Trejo RN

-------------------------------------------------------------------------------

NURSE NOTES:

Actual time of documentation is 10/05/19@ 6285

## 2019-10-05 NOTE — NUR
CASE MANAGEMENT:REVIEW



10/5/19



SI:FTT. GRAVELY DISABLED

**LEAKING URINE ON FLOOR

T 97.8 HR 76 RR 19 B/P 132/80 SATS 97% ON RA 

NO LABS TODAY 



IS: DITROPAN PO Q8HRS

LISINOPRIL PO QD

NORVASC PO QD

LEXAPRO PO QD

RISPERDAL PO QHS

FLOMAX PO BID

ATIVAN PO Q6HRS PRN

HEPARIN SQ Q12



**: MED/SURG STATUS 4 [facility]

## 2019-10-05 NOTE — NUR
NURSE NOTES:



HANDOFF RECEIVED FROM SALONI CALVIN. PATIENT RECEIVED SLEEPING IN BED. PATIENT HAS NO IV SITE BUT 
MD IS AWARE. PATIENT HAS NO VISIBLE SIGNS OF DISTRESS, BED IS IN THE LOW AND LOCKED POSITION 
WITH WALKER AT BEDSIDE. CALL LIGHT IS WITHIN REACH. WILL CONTINUE TO MONITOR PATIENT.

## 2019-10-05 NOTE — NUR
NURSE NOTES:

Pt is in bed asleep. No C/O pain or discomfort @this time. Meds administered as ordered and 
assisted as needed. Bed in low and locked position, bed alarm is on. Call light within 
reach, walker at bedside. Pt will be monitored.

## 2019-10-06 VITALS — DIASTOLIC BLOOD PRESSURE: 79 MMHG | SYSTOLIC BLOOD PRESSURE: 117 MMHG

## 2019-10-06 VITALS — DIASTOLIC BLOOD PRESSURE: 96 MMHG | SYSTOLIC BLOOD PRESSURE: 136 MMHG

## 2019-10-06 VITALS — DIASTOLIC BLOOD PRESSURE: 70 MMHG | SYSTOLIC BLOOD PRESSURE: 130 MMHG

## 2019-10-06 VITALS — SYSTOLIC BLOOD PRESSURE: 138 MMHG | DIASTOLIC BLOOD PRESSURE: 70 MMHG

## 2019-10-06 VITALS — DIASTOLIC BLOOD PRESSURE: 68 MMHG | SYSTOLIC BLOOD PRESSURE: 135 MMHG

## 2019-10-06 VITALS — DIASTOLIC BLOOD PRESSURE: 75 MMHG | SYSTOLIC BLOOD PRESSURE: 146 MMHG

## 2019-10-06 RX ADMIN — OXYBUTYNIN CHLORIDE SCH MG: 5 TABLET ORAL at 21:48

## 2019-10-06 RX ADMIN — OXYBUTYNIN CHLORIDE SCH MG: 5 TABLET ORAL at 05:57

## 2019-10-06 RX ADMIN — OXYBUTYNIN CHLORIDE SCH MG: 5 TABLET ORAL at 13:23

## 2019-10-06 RX ADMIN — LISINOPRIL SCH MG: 2.5 TABLET ORAL at 09:00

## 2019-10-06 NOTE — NUR
CASE MANAGEMENT:REVIEW



10/6/19



SI:FTT. GRAVELY DISABLED

**LEAKING URINE ON FLOOR

T 98.3 HR 76 RR 19 B/P 117/79 SATS 96% ON RA 

NO LABS TODAY 



IS: DITROPAN PO Q8HRS

LISINOPRIL PO QD

NORVASC PO QD

LEXAPRO PO QD

RISPERDAL PO QHS

FLOMAX PO BID

ATIVAN PO Q6HRS PRN

HEPARIN SQ Q12



**: MED/SURG STATUS 4 [facility]

## 2019-10-06 NOTE — NUR
NURSE NOTES:

received pt ambulating in hallway, with walker, no complaint of pain or discomfort, steady 
gait. No IV access, MD aware. Will continue to monitor pt and follow up with the plan of 
care.

## 2019-10-06 NOTE — NUR
NURSE NOTES:

Patient is in bed, asleep. Pt is alert, forgetful and verbally able to make needs known.  
Ambulates with walker, dwayneer now. Breathing  on room air with no signs of distress or SOB 
noted. Bed locked and in lowest position. Bed alarm is on. Call light in reach. Patient will 
be monitored. Fall Precaution in place. 

-------------------------------------------------------------------------------

Addendum: 10/07/19 at 0231 by NIKUNJ PASCAL RN RN

-------------------------------------------------------------------------------

Trainee, Minna Trejo RN will be assisting in the care of this patient.

## 2019-10-06 NOTE — NUR
NURSE NOTES:

Patient is in bed, asleep. Pt is alert, forgetful and verbally able to needs known.  
Ambulates with walker. Breathing  on room air with no signs of distress or SOB noted. Bed 
locked and in lowest position. Bed alarm is on. Call light in reach. Patient will be 
monitored.

## 2019-10-06 NOTE — GENERAL PROGRESS NOTE
Assessment/Plan


Problem List:  


(1) Failure to thrive


SNOMED:  61128326


Qualifiers:  


   Qualified Codes:  R62.7 - Adult failure to thrive


(2) Gravely disabled


SNOMED:  994889444


(3) Acute encephalopathy


ICD Codes:  G93.40 - Encephalopathy, unspecified


SNOMED:  33922458, 684201530


(4) Encounter for generalized patient complaints


ICD Codes:  Z00.8 - Encounter for other general examination


SNOMED:  830994162


(5) Dementia with behavioral disturbance


ICD Codes:  F03.91 - Unspecified dementia with behavioral disturbance


SNOMED:  0018171075279


(6) Essential hypertension


ICD Codes:  I10 - Essential (primary) hypertension


SNOMED:  69899218


Status:  doing well, stable


Assessment/Plan:


64 year old male admitted from facility for acute encephalopathy, Failure to 

thrive and dementia with behavioral disturbance





1. Acute encephalopathy/ dementia with behavioral disturbance. Doubt metabolic, 

based on reviewing his labs. His renal function is at baseline and stable 


-psychiatry consult with Dr. Meyers 


-PT, patient seen ambulating independently with walker 


Case management for dc planning: Still pending placement





2. Obstructive uropathy with staghorn calculus.BPH.  Renal function stable. 

Resume Tamsulosin 


3. CKD stage II-III, stable. 


4. Failure to thrive. 


5. Dementia with behavioral disturbance. Oriented to self only. Resume 

outpatient psych meds. Risperidone 2mg at night per psychiatry and ativan prn 


6.HTN, controlled


-Amlodipine 10mg, Atenolol stopped due to bradycardia. Continue lisinopril 5mg 

PO daily. Recheck BMP in one week (10/10/19)


7. Etoh dependence in the past. Thiamine and folate 


8. Neurogenic bladder: Oxybutynin 5mg TID 





has no insurance,  involved. applied for medical. Still pending





Time of this note may not reflect time of encounter. 


Discussed with RN and patient. I spent 19 minutes on this encounter. 50% or 

more on counselling and care coordination.





Subjective


Date patient seen:  Oct 6, 2019


Constitutional:  Denies: chills, diaphoresis, fever, malaise, weakness, other


HEENT:  Denies: eye pain, blurred vision, tearing, double vision, ear pain, ear 

discharge, nose pain, nose congestion, throat pain, throat swelling, mouth pain

, mouth swelling, other


Cardiovascular:  Denies: chest pain, edema, irregular heart rate, 

lightheadedness, palpitations, syncope, other


Respiratory:  Denies: cough, orthopnea, shortness of breath, SOB with excertion

, SOB at rest, sputum, stridor, wheezing, other


Gastrointestinal/Abdominal:  Denies: abdomen distended, abdominal pain, black 

stools, tarry stools, blood in stool, constipated, diarrhea, difficulty 

swallowing, nausea, poor appetite, poor fluid intake, rectal bleeding, vomiting

, other


Genitourinary:  Denies: burning, discharge, frequency, flank pain, hematuria, 

incontinence, pain, urgency, other


Neurologic/Psychiatric:  Denies: anxiety, depressed, emotional problems, 

headache, numbness, paresthesia, pre-existing deficit, seizure, tingling, 

tremors, weakness, other


Endocrine:  Denies: excessive sweating, flushing, intolerance to cold, 

intolerance to heat, increased hunger, increased thirst, increased urine, 

unexplained weight gain, unexplained weight loss, other


Hematologic/Lymphatic:  Denies: anemia, easy bleeding, easy bruising, other


Allergies:  


Coded Allergies:  


     No Known Allergies (Unverified , 7/23/19)


Subjective


No acute events overnight per nursing. No complaints. Feels about the same. 

Still waiting for placement.


HTN: Blood pressure improved to -120s. Tolerating lisinopril. No side 

effects so far. Denies headaches, numbness, tingling. Denies chest pain or SOB.





Objective





Last 24 Hour Vital Signs








  Date Time  Temp Pulse Resp B/P (MAP) Pulse Ox O2 Delivery O2 Flow Rate FiO2


 


10/6/19 09:00    117/79    


 


10/6/19 09:00      Room Air  


 


10/6/19 08:00 98.3 76 19 117/79 (92) 96   


 


10/6/19 04:00 98.8 57 20 135/68 (90) 96   


 


10/6/19 00:00 97.8 60 18 130/70 (90) 95   


 


10/5/19 21:00      Room Air  


 


10/5/19 20:00 98.1 55 19 126/71 (89) 95   


 


10/5/19 15:56 97.8 76 19 132/80 (97) 97   


 


10/5/19 12:00 98.0 71 17 148/85 (106) 97   

















Intake and Output  


 


 10/5/19 10/6/19





 19:00 07:00


 


Intake Total 900 ml 


 


Balance 900 ml 


 


  


 


Other 900 ml 


 


# Voids  2








Height (Feet):  5


Height (Inches):  10.00


Weight (Pounds):  193


General Appearance:  WD/WN, no apparent distress, alert


EENT:  PERRL/EOMI, normal ENT inspection


Neck:  non-tender, normal alignment


Cardiovascular:  normal peripheral pulses, normal rate, regular rhythm, no JVD


Respiratory/Chest:  chest wall non-tender, lungs clear, normal breath sounds


Abdomen:  normal bowel sounds, non tender, soft


Extremities:  normal range of motion, non-tender, normal inspection


Neurologic:  CNs II-XII grossly normal, no motor/sensory deficits, alert, 

oriented x 3, responsive


Skin:  normal pigmentation, warm/dry











Melvin De La Cruz D.O. Oct 6, 2019 11:54

## 2019-10-07 VITALS — SYSTOLIC BLOOD PRESSURE: 127 MMHG | DIASTOLIC BLOOD PRESSURE: 81 MMHG

## 2019-10-07 VITALS — DIASTOLIC BLOOD PRESSURE: 70 MMHG | SYSTOLIC BLOOD PRESSURE: 132 MMHG

## 2019-10-07 VITALS — DIASTOLIC BLOOD PRESSURE: 76 MMHG | SYSTOLIC BLOOD PRESSURE: 139 MMHG

## 2019-10-07 VITALS — DIASTOLIC BLOOD PRESSURE: 70 MMHG | SYSTOLIC BLOOD PRESSURE: 138 MMHG

## 2019-10-07 VITALS — DIASTOLIC BLOOD PRESSURE: 93 MMHG | SYSTOLIC BLOOD PRESSURE: 139 MMHG

## 2019-10-07 VITALS — SYSTOLIC BLOOD PRESSURE: 140 MMHG | DIASTOLIC BLOOD PRESSURE: 72 MMHG

## 2019-10-07 RX ADMIN — OXYBUTYNIN CHLORIDE SCH MG: 5 TABLET ORAL at 22:28

## 2019-10-07 RX ADMIN — OXYBUTYNIN CHLORIDE SCH MG: 5 TABLET ORAL at 13:21

## 2019-10-07 RX ADMIN — LISINOPRIL SCH MG: 2.5 TABLET ORAL at 08:16

## 2019-10-07 RX ADMIN — OXYBUTYNIN CHLORIDE SCH MG: 5 TABLET ORAL at 06:29

## 2019-10-07 NOTE — NUR
NURSE NOTES:

Pt is in bed asleep. Pt C/O pain 9/10. Pain medication  administered as ordered and assisted 
as needed. Bed in low and locked position, bed alarm is on. Call light within reach, walker 
at bedside. Pt will be monitored. 

-------------------------------------------------------------------------------

Addendum: 10/07/19 at 0236 by Lois Trejo RN

-------------------------------------------------------------------------------

Disregard the above documentation. incorrect patient.

## 2019-10-07 NOTE — NUR
NURSE NOTES:

Pt is awake and sitting in the chair. No C/O pain or discomfort @this time. Meds 
administered as ordered and assisted as needed. Fall precaution in place.Bed in low and 
locked position, bed alarm is on. Call light within reach, walker at bedside. Pt will be 
monitored.

## 2019-10-07 NOTE — NUR
NURSE NOTES:

received pt ambulating in hallway, without walker, no complaint of pain or discomfort, 
unsteady gait. Nurse told patient to use the walker next time he walks, for safety. Patient 
verbalized understanding, and went back to bed with nurse assistance. No IV access, MD 
aware. Will continue to monitor pt and follow up with the plan of care.

## 2019-10-07 NOTE — GENERAL PROGRESS NOTE
Assessment/Plan


Problem List:  


(1) Failure to thrive


SNOMED:  78463013


Qualifiers:  


   Qualified Codes:  R62.7 - Adult failure to thrive


(2) Gravely disabled


SNOMED:  787621969


(3) Acute encephalopathy


ICD Codes:  G93.40 - Encephalopathy, unspecified


SNOMED:  58500222, 956019500


(4) Encounter for generalized patient complaints


ICD Codes:  Z00.8 - Encounter for other general examination


SNOMED:  982716450


(5) Dementia with behavioral disturbance


ICD Codes:  F03.91 - Unspecified dementia with behavioral disturbance


SNOMED:  6390741791221


(6) Essential hypertension


ICD Codes:  I10 - Essential (primary) hypertension


SNOMED:  41791295


Status:  doing well, stable


Assessment/Plan:


64 year old male admitted from facility for acute encephalopathy, Failure to 

thrive and dementia with behavioral disturbance





1. Acute encephalopathy/ dementia with behavioral disturbance. Doubt metabolic, 

based on reviewing his labs. His renal function is at baseline and stable 


-psychiatry consult with Dr. Meyers 


-PT, patient seen ambulating independently with walker 


Case management for dc planning: Still pending placement





2. Obstructive uropathy with staghorn calculus.BPH.  Renal function stable. 

Resume Tamsulosin 


3. CKD stage II-III, stable. 


4. Failure to thrive. 


5. Dementia with behavioral disturbance. Oriented to self only. Resume 

outpatient psych meds. Risperidone 2mg at night per psychiatry and ativan prn 


6.HTN, controlled


-Amlodipine 10mg, Atenolol stopped due to bradycardia. Continue lisinopril 5mg 

PO daily. Recheck BMP in one week (10/10/19)


7. Etoh dependence in the past. Thiamine and folate 


8. Neurogenic bladder: Oxybutynin 5mg TID 





has no insurance,  involved. applied for medical. Still pending





Time of this note may not reflect time of encounter. 


Discussed with RN and patient. I spent 20 minutes on this encounter. 50% or 

more on counselling and care coordination.





Subjective


Date patient seen:  Oct 7, 2019


Constitutional:  Denies: chills, diaphoresis, fever, malaise, weakness, other


HEENT:  Denies: eye pain, blurred vision, tearing, double vision, ear pain, ear 

discharge, nose pain, nose congestion, throat pain, throat swelling, mouth pain

, mouth swelling, other


Cardiovascular:  Denies: chest pain, edema, irregular heart rate, 

lightheadedness, palpitations, syncope, other


Respiratory:  Denies: cough, orthopnea, shortness of breath, SOB with excertion

, SOB at rest, sputum, stridor, wheezing, other


Gastrointestinal/Abdominal:  Denies: abdomen distended, abdominal pain, black 

stools, tarry stools, blood in stool, constipated, diarrhea, difficulty 

swallowing, nausea, poor appetite, poor fluid intake, rectal bleeding, vomiting

, other


Genitourinary:  Denies: burning, discharge, frequency, flank pain, hematuria, 

incontinence, pain, urgency, other


Neurologic/Psychiatric:  Denies: anxiety, depressed, emotional problems, 

headache, numbness, paresthesia, pre-existing deficit, seizure, tingling, 

tremors, weakness, other


Endocrine:  Denies: excessive sweating, flushing, intolerance to cold, 

intolerance to heat, increased hunger, increased thirst, increased urine, 

unexplained weight gain, unexplained weight loss, other


Hematologic/Lymphatic:  Denies: anemia, easy bleeding, easy bruising, other


Allergies:  


Coded Allergies:  


     No Known Allergies (Unverified , 7/23/19)


Subjective


No acute events overnight per nursing. No acute changes. No complaints. Feels 

well. Still waiting for placement.


HTN: Blood pressure improved to -120s. Tolerating lisinopril. No side 

effects so far. Denies headaches, numbness, tingling. Denies chest pain or SOB.





Objective





Last 24 Hour Vital Signs








  Date Time  Temp Pulse Resp B/P (MAP) Pulse Ox O2 Delivery O2 Flow Rate FiO2


 


10/7/19 16:00 97.5 81 19 127/81 (96) 96   


 


10/7/19 12:00 98.4 59 19 139/76 (97) 98   


 


10/7/19 09:00      Room Air  


 


10/7/19 08:16    138/70    


 


10/7/19 08:00 99.3 58 18 138/70 (92) 96   


 


10/7/19 04:00 97.6 60 18 132/70 (90) 96   


 


10/7/19 00:40 97.6 60 18 132/70 (90) 96   


 


10/7/19 00:00 98.0 61 18 140/72 (94) 95   


 


10/6/19 21:00      Room Air  


 


10/6/19 20:00 98.2 60 18 146/75 (98) 96   

















Intake and Output  


 


 10/6/19 10/7/19





 19:00 07:00


 


Intake Total 1000 ml 240 ml


 


Balance 1000 ml 240 ml


 


  


 


Intake Oral  240 ml


 


Other 1000 ml 


 


# Voids  4








Height (Feet):  5


Height (Inches):  10.00


Weight (Pounds):  193


General Appearance:  WD/WN, no apparent distress, alert


EENT:  PERRL/EOMI


Neck:  non-tender, normal alignment


Cardiovascular:  normal peripheral pulses, normal rate, regular rhythm, no JVD


Respiratory/Chest:  chest wall non-tender, lungs clear, normal breath sounds


Abdomen:  normal bowel sounds, non tender, soft, no organomegaly, no mass


Extremities:  normal range of motion, non-tender


Neurologic:  CNs II-XII grossly normal, no motor/sensory deficits, alert, 

oriented x 3


Skin:  normal pigmentation, warm/dry











Melvin De La Cruz D.O. Oct 7, 2019 18:18

## 2019-10-07 NOTE — NUR
CASE MANAGEMENT:REVIEW



10/7/19



SI:FTT. GRAVELY DISABLED

**LEAKING URINE ON FLOOR

99.3 58  18  138/ 70  96% RA





IS: DITROPAN PO Q8HRS

LISINOPRIL PO QD

TYLENOL PO Q4/PRN



**: MED/SURG STATUS 4 EAST



DCP: PLACEMENT

## 2019-10-07 NOTE — NUR
NURSE NOTES:

RECEIVED PT FROM SALONI KHAN. PT IS AWAKE, AAOX2, ON ROOM AIR, NO ACUTE DISTRESS NOTED. 
WALKER AT BEDSIDE.  NO IV ACCESS. MD AWARE. BED IS LOCKED AND LOW, BED ALARMS ACTIVE, SIDE 
RAILS UP X2, AND CALL LIGHT IS WITHIN REACH. WILL CONTINUE TO MONITOR.

## 2019-10-08 VITALS — SYSTOLIC BLOOD PRESSURE: 147 MMHG | DIASTOLIC BLOOD PRESSURE: 74 MMHG

## 2019-10-08 VITALS — DIASTOLIC BLOOD PRESSURE: 68 MMHG | SYSTOLIC BLOOD PRESSURE: 144 MMHG

## 2019-10-08 VITALS — DIASTOLIC BLOOD PRESSURE: 93 MMHG | SYSTOLIC BLOOD PRESSURE: 143 MMHG

## 2019-10-08 VITALS — DIASTOLIC BLOOD PRESSURE: 88 MMHG | SYSTOLIC BLOOD PRESSURE: 130 MMHG

## 2019-10-08 VITALS — SYSTOLIC BLOOD PRESSURE: 148 MMHG | DIASTOLIC BLOOD PRESSURE: 79 MMHG

## 2019-10-08 VITALS — DIASTOLIC BLOOD PRESSURE: 75 MMHG | SYSTOLIC BLOOD PRESSURE: 120 MMHG

## 2019-10-08 RX ADMIN — OXYBUTYNIN CHLORIDE SCH MG: 5 TABLET ORAL at 06:46

## 2019-10-08 RX ADMIN — OXYBUTYNIN CHLORIDE SCH MG: 5 TABLET ORAL at 22:12

## 2019-10-08 RX ADMIN — OXYBUTYNIN CHLORIDE SCH MG: 5 TABLET ORAL at 14:24

## 2019-10-08 RX ADMIN — LISINOPRIL SCH MG: 2.5 TABLET ORAL at 12:25

## 2019-10-08 NOTE — NUR
*-* INSURANCE *-*



UPDATED CLINICALS AND REVIEWS HAVE BEEN FAXED TO:



[facility]:JENNYFER GRIER

P: [phone]

F: [phone]

## 2019-10-08 NOTE — GENERAL PROGRESS NOTE
Assessment/Plan


Problem List:  


(1) Failure to thrive


SNOMED:  30814620


Qualifiers:  


   Qualified Codes:  R62.7 - Adult failure to thrive


(2) Gravely disabled


SNOMED:  781275613


(3) Acute encephalopathy


ICD Codes:  G93.40 - Encephalopathy, unspecified


SNOMED:  66244023, 051191396


(4) Encounter for generalized patient complaints


ICD Codes:  Z00.8 - Encounter for other general examination


SNOMED:  209511985


(5) Dementia with behavioral disturbance


ICD Codes:  F03.91 - Unspecified dementia with behavioral disturbance


SNOMED:  9283295223976


(6) Essential hypertension


ICD Codes:  I10 - Essential (primary) hypertension


SNOMED:  96944636


Status:  doing well, stable


Assessment/Plan:


64 year old male admitted from facility for acute encephalopathy, Failure to 

thrive and dementia with behavioral disturbance





1. Acute encephalopathy/ dementia with behavioral disturbance. Doubt metabolic, 

based on reviewing his labs. His renal function is at baseline and stable - 

RESOLVED


-psychiatry consult with Dr. Meyers 


-PT, patient seen ambulating independently with walker 


Case management for dc planning: Still pending placement





2. Obstructive uropathy with staghorn calculus.BPH.  Renal function stable. 

Continue Tamsulosin 


3. CKD stage II-III, stable. 


4. Failure to thrive. 


5. Dementia with behavioral disturbance.  Resume outpatient psych meds. 

Risperidone 2mg at night per psychiatry and ativan prn 


6.HTN, controlled


-Amlodipine 10mg, Atenolol stopped due to bradycardia. Continue lisinopril 5mg 

PO daily. Recheck BMP in one week (10/10/19)


7. Etoh dependence in the past. Thiamine and folate 


8. Neurogenic bladder: Oxybutynin 5mg TID 





has no insurance,  involved. applied for medical. Still pending





Time of this note may not reflect time of encounter. 


Discussed with RN and patient. I spent 21 minutes on this encounter. 50% or 

more on counselling and care coordination.





Subjective


Date patient seen:  Oct 8, 2019


Constitutional:  Denies: no symptoms, chills, diaphoresis, fever, malaise, 

weakness, other


HEENT:  Denies: no symptoms, eye pain, blurred vision, tearing, double vision, 

ear pain, ear discharge, nose pain, nose congestion, throat pain, throat 

swelling, mouth pain, mouth swelling, other


Cardiovascular:  Denies: no symptoms, chest pain, edema, irregular heart rate, 

lightheadedness, palpitations, syncope, other


Respiratory:  Denies: no symptoms, cough, orthopnea, shortness of breath, SOB 

with excertion, SOB at rest, sputum, stridor, wheezing, other


Gastrointestinal/Abdominal:  Denies: no symptoms, abdomen distended, abdominal 

pain, black stools, tarry stools, blood in stool, constipated, diarrhea, 

difficulty swallowing, nausea, poor appetite, poor fluid intake, rectal bleeding

, vomiting, other


Genitourinary:  Denies: no symptoms, burning, discharge, frequency, flank pain, 

hematuria, incontinence, pain, urgency, other


Neurologic/Psychiatric:  Denies: no symptoms, anxiety, depressed, emotional 

problems, headache, numbness, paresthesia, pre-existing deficit, seizure, 

tingling, tremors, weakness, other


Endocrine:  Denies: no symptoms, excessive sweating, flushing, intolerance to 

cold, intolerance to heat, increased hunger, increased thirst, increased urine, 

unexplained weight gain, unexplained weight loss, other


Hematologic/Lymphatic:  Denies: no symptoms, anemia, easy bleeding, easy 

bruising, other


Allergies:  


Coded Allergies:  


     No Known Allergies (Unverified , 7/23/19)


Subjective


No acute events overnight per nursing. No acute changes. No complaints. Feels 

well. Still waiting for placement. Patient ambulates without difficulty, he 

denies any complaints


HTN: Blood pressure improved to -120s. Tolerating lisinopril. No side 

effects so far. Denies headaches, numbness, tingling. Denies chest pain or SOB.





Objective





Last 24 Hour Vital Signs








  Date Time  Temp Pulse Resp B/P (MAP) Pulse Ox O2 Delivery O2 Flow Rate FiO2


 


10/8/19 12:25    147/74    


 


10/8/19 08:00 98.6 58 16 144/68 (93) 96   


 


10/8/19 04:00 97.6 58 18 120/75 (90) 96   


 


10/8/19 00:00 98.0 58 18 143/93 (110) 96   


 


10/7/19 21:00      Room Air  


 


10/7/19 20:00 97.9 57 18 139/93 (108) 96   


 


10/7/19 16:00 97.5 81 19 127/81 (96) 96   

















Intake and Output  


 


 10/7/19 10/8/19





 19:00 07:00


 


Intake Total 240 ml 


 


Balance 240 ml 


 


  


 


Intake Oral 240 ml 


 


# Voids 3 4


 


# Bowel Movements 1 








Height (Feet):  5


Height (Inches):  10.00


Weight (Pounds):  193


General Appearance:  WD/WN, no apparent distress, alert


EENT:  PERRL/EOMI, normal ENT inspection, TMs normal


Neck:  non-tender, normal alignment, supple


Cardiovascular:  normal peripheral pulses, normal rate, regular rhythm, no JVD


Respiratory/Chest:  chest wall non-tender, lungs clear, normal breath sounds, 

no respiratory distress


Abdomen:  normal bowel sounds, non tender, soft, no organomegaly, no mass


Edema:  other - no LE edema bilaterally


Skin:  normal pigmentation, warm/dry











Melvin De La Cruz D.O. Oct 8, 2019 13:47

## 2019-10-08 NOTE — NUR
HAND-OFF: 

Report given to Samosn.

-------------------------------------------------------------------------------

Addendum: 10/08/19 at 1926 by Sisi Giles RN

-------------------------------------------------------------------------------

HAND-OFF: 

Report given to Thu.

## 2019-10-08 NOTE — NUR
NURSE NOTES:

Received patient awake. No SOB or cardiac distress. No complains of pain at this time. Kept 
head of bed elevated. Bed locked in lowest position. Instructed to use walker during 
ambulation. Call light within reach. Will continue plan of care.

## 2019-10-08 NOTE — NUR
***DISCHARGE PLANNING

SPOKE WITH ROXANN AT "[facility]" 

THEY NO LONGER HAVE BED AVAILABLE

THIS  AND  WILL HAVE TO START LOOKING FOR BED AGAIN

## 2019-10-08 NOTE — NUR
CASE MANAGEMENT:REVIEW



10/8/19



SI:FTT. GRAVELY DISABLED

**LEAKING URINE ON FLOOR

98.6  58  16  144/68  96% ON RA





IS: DITROPAN PO Q8HRS

LISINOPRIL PO QD

TYLENOL PO Q4/PRN



**: MED/SURG STATUS 4 EAST



DCP: PLACEMENT

## 2019-10-08 NOTE — NUR
NURSE NOTES:

RECEIVED PT FROM SALONI LAGOS AND SALONI MORGAN. PT IS ASLEEP, AAO X2, ON ROOM AIR, NO ACUTE DISTRESS 
NOTED. IV HAS NO IV ACCESS, MD AWARE. FALL RISK PRECAUTIONS IMPLEMENTED. WALKER NOTED AT 
BEDSIDE. BED IS LOCKED AND LOW, BED ALARMS ACTIVE, SIDE RAILS UP X2, AND CALL LIGHT IS 
WITHIN REACH. WILL CONTINUE TO MONITOR.

## 2019-10-09 VITALS — SYSTOLIC BLOOD PRESSURE: 136 MMHG | DIASTOLIC BLOOD PRESSURE: 82 MMHG

## 2019-10-09 VITALS — SYSTOLIC BLOOD PRESSURE: 134 MMHG | DIASTOLIC BLOOD PRESSURE: 70 MMHG

## 2019-10-09 VITALS — DIASTOLIC BLOOD PRESSURE: 95 MMHG | SYSTOLIC BLOOD PRESSURE: 125 MMHG

## 2019-10-09 VITALS — DIASTOLIC BLOOD PRESSURE: 74 MMHG | SYSTOLIC BLOOD PRESSURE: 133 MMHG

## 2019-10-09 VITALS — SYSTOLIC BLOOD PRESSURE: 152 MMHG | DIASTOLIC BLOOD PRESSURE: 86 MMHG

## 2019-10-09 RX ADMIN — OXYBUTYNIN CHLORIDE SCH MG: 5 TABLET ORAL at 06:47

## 2019-10-09 RX ADMIN — OXYBUTYNIN CHLORIDE SCH MG: 5 TABLET ORAL at 15:26

## 2019-10-09 RX ADMIN — LISINOPRIL SCH MG: 2.5 TABLET ORAL at 09:27

## 2019-10-09 RX ADMIN — OXYBUTYNIN CHLORIDE SCH MG: 5 TABLET ORAL at 22:29

## 2019-10-09 NOTE — NUR
*-* INSURANCE *-*



UPDATED CLINICALS AND REVIEWS HAVE BEEN FAXED TO:



[facility]:JENNYFER GRIER

P: [phone]

F: [phone]

## 2019-10-09 NOTE — NUR
NURSE NOTES:

Received patient on bed awake. No SOB or cardiac distress. Noted with wandering behavior, 
kept on close watch. Maintained on fall precautions. Head of bed elevated. Bed locked in 
lowest position. Call light within reach. Will continue plan of care.

## 2019-10-09 NOTE — NUR
NURSE NOTES:

RECEIVED PT FROM SALONI LAGOS AND SALONI MORGAN. PT IS ASLEEP, ON ROOM AIR, NO ACUTE DISTRESS NOTED. PT 
HAS NO IV ACCESS, MD AWARE. FALL PRECAUTIONS IMPLEMENTED. WALKER IS AT BEDSIDE. BED IS 
LOCKED AND LOW, BED ALARMS ACTIVE, SIDE RAILS UP X2 AND CALL LIGHT IS WITHIN REACH. WILL 
CONTINUE TO MONITOR.

## 2019-10-09 NOTE — NUR
CASE MANAGEMENT:REVIEW



10/9/19



SI:FTT. GRAVELY DISABLED

**LEAKING URINE ON FLOOR

97.7   58  20  152/86  96% ON RA





IS: DITROPAN PO Q8HRS

LISINOPRIL PO QD

TYLENOL PO Q4/PRN



**: MED/SURG STATUS 4 EAST



DCP: PLACEMENT

## 2019-10-09 NOTE — NUR
RD ASSESSMENT & RECOMMENDATIONS

SEE CARE ACTIVITY FOR COMPLETE ASSESSMENT



DAILY ESTIMATED NEEDS:

Needs based on Cardiac 80kg adj  

25-30  kcals/kg 

2181-5080  total kcals

1-1.2  g protein/kg

80-96  g total protein 

25-30  mL/kg

3247-3598  total fluid mLs



NUTRITION DIAGNOSIS:

Altered nutrition related lab values r/t clinical status as evidenced by

elev Creat(1.4-> now wnl), elev AST, elev lipase (446), elev BP (154/93

-> wnl at this time).





CURRENT DIET:Regular     



 

PO DIET RECOMMENDATIONS:

LOW NA diet / soft easy chew  



 



ADDITIONAL RECOMMENDATIONS:

1) Obtain a standing weight as able / or calibrated bed scale wt 

      -> pt w/ extended admission 

2) Updated BMP as able, monitor lytes, replete as needed 

3) Snacks BID in b/w meals  

4) H/o C-diff, rec probiotics 

.

## 2019-10-09 NOTE — GENERAL PROGRESS NOTE
Assessment/Plan


Problem List:  


(1) Failure to thrive


SNOMED:  25752966


Qualifiers:  


   Qualified Codes:  R62.7 - Adult failure to thrive


(2) Gravely disabled


SNOMED:  464261472


(3) Acute encephalopathy


ICD Codes:  G93.40 - Encephalopathy, unspecified


SNOMED:  47084831, 927087485


(4) Encounter for generalized patient complaints


ICD Codes:  Z00.8 - Encounter for other general examination


SNOMED:  135260783


(5) Dementia with behavioral disturbance


ICD Codes:  F03.91 - Unspecified dementia with behavioral disturbance


SNOMED:  9846505802097


(6) Essential hypertension


ICD Codes:  I10 - Essential (primary) hypertension


SNOMED:  37839859


Status:  doing well, stable


Assessment/Plan:


64 year old male admitted from facility for acute encephalopathy, Failure to 

thrive and dementia with behavioral disturbance





1. Acute encephalopathy/ dementia with behavioral disturbance. Doubt metabolic, 

based on reviewing his labs. His renal function is at baseline and stable - 

RESOLVED


-psychiatry consult with Dr. Meyers 


-PT, patient seen ambulating independently with walker 


Case management for dc planning: Still pending placement





2. Obstructive uropathy with staghorn calculus.BPH.  Renal function stable. 

Continue Tamsulosin 


3. CKD stage II-III, stable. 


4. Failure to thrive. 


5. Dementia with behavioral disturbance.  Resume outpatient psych meds. 

Risperidone 2mg at night per psychiatry and ativan prn 


6.HTN, controlled


-Amlodipine 10mg, Atenolol stopped due to bradycardia. Continue lisinopril 5mg 

PO daily. Recheck BMP TOMORROW


7. Etoh dependence in the past. Thiamine and folate 


8. Neurogenic bladder: Oxybutynin 5mg TID 





has no insurance,  involved. applied for medical. Still pending





Time of this note may not reflect time of encounter. 


Discussed with RN and patient. I spent 29 minutes on this encounter. 50% or 

more on counselling and care coordination.





Subjective


Date patient seen:  Oct 9, 2019


Constitutional:  Denies: chills, diaphoresis, fever, malaise, weakness, other


HEENT:  Denies: eye pain, blurred vision, tearing, double vision, ear pain, ear 

discharge, nose pain, nose congestion, throat pain, throat swelling, mouth pain

, mouth swelling, other


Cardiovascular:  Denies: chest pain, edema, irregular heart rate, 

lightheadedness, palpitations, syncope, other


Respiratory:  Denies: cough, orthopnea, shortness of breath, SOB with excertion

, SOB at rest, sputum, stridor, wheezing, other


Gastrointestinal/Abdominal:  Denies: abdomen distended, abdominal pain, black 

stools, tarry stools, blood in stool, constipated, diarrhea, difficulty 

swallowing, nausea, poor appetite, poor fluid intake, rectal bleeding, vomiting

, other


Genitourinary:  Denies: burning, discharge, frequency, flank pain, hematuria, 

incontinence, pain, urgency, other


Neurologic/Psychiatric:  Denies: anxiety, depressed, emotional problems, 

headache, numbness, paresthesia, pre-existing deficit, seizure, tingling, 

tremors, weakness, other


Endocrine:  Denies: excessive sweating, flushing, intolerance to cold, 

intolerance to heat, increased hunger, increased thirst, increased urine, 

unexplained weight gain, unexplained weight loss, other


Hematologic/Lymphatic:  Denies: anemia, easy bleeding, easy bruising, other


Allergies:  


Coded Allergies:  


     No Known Allergies (Unverified , 7/23/19)


Subjective


No acute events overnight per nursing. No acute changes. Patient has no 

complaints No complaints. Feels well. Still waiting for placement. Patient 

ambulates without difficulty, he denies any complaints


HTN: Blood pressure stable at -120s. Tolerating lisinopril. No side 

effects so far. Denies headaches, numbness, tingling. Denies chest pain or SOB.





Objective





Last 24 Hour Vital Signs








  Date Time  Temp Pulse Resp B/P (MAP) Pulse Ox O2 Delivery O2 Flow Rate FiO2


 


10/9/19 12:00 98.4 60 20 125/95 (105) 98   


 


10/9/19 09:27    152/86    


 


10/9/19 09:00      Room Air  


 


10/9/19 08:00 97.7 58 20 152/86 (108) 96   


 


10/9/19 00:00 97.5 58 17 134/70 (91) 96   


 


10/8/19 21:00      Room Air  


 


10/8/19 20:00 98.2 53 17 148/79 (102) 94   


 


10/8/19 16:00 99.0 82 21 130/88 (102) 97   

















Intake and Output  


 


 10/8/19 10/9/19





 19:00 07:00


 


Intake Total 480 ml 


 


Balance 480 ml 


 


  


 


Intake Oral 480 ml 


 


# Voids 3 1








Height (Feet):  5


Height (Inches):  10.00


Weight (Pounds):  192


General Appearance:  WD/WN, no apparent distress, alert


EENT:  PERRL/EOMI, normal ENT inspection


Neck:  non-tender, normal alignment, supple, normal inspection


Cardiovascular:  normal peripheral pulses, normal rate, regular rhythm, no JVD


Respiratory/Chest:  chest wall non-tender, lungs clear, normal breath sounds


Abdomen:  normal bowel sounds, non tender, soft, no organomegaly, no mass


Extremities:  normal range of motion, non-tender, normal inspection


Skin:  normal pigmentation, warm/dry











Melvin De La Cruz D.O. Oct 9, 2019 14:06

## 2019-10-10 VITALS — DIASTOLIC BLOOD PRESSURE: 62 MMHG | SYSTOLIC BLOOD PRESSURE: 108 MMHG

## 2019-10-10 VITALS — SYSTOLIC BLOOD PRESSURE: 147 MMHG | DIASTOLIC BLOOD PRESSURE: 72 MMHG

## 2019-10-10 VITALS — DIASTOLIC BLOOD PRESSURE: 53 MMHG | SYSTOLIC BLOOD PRESSURE: 111 MMHG

## 2019-10-10 VITALS — SYSTOLIC BLOOD PRESSURE: 134 MMHG | DIASTOLIC BLOOD PRESSURE: 73 MMHG

## 2019-10-10 VITALS — SYSTOLIC BLOOD PRESSURE: 127 MMHG | DIASTOLIC BLOOD PRESSURE: 79 MMHG

## 2019-10-10 VITALS — DIASTOLIC BLOOD PRESSURE: 73 MMHG | SYSTOLIC BLOOD PRESSURE: 130 MMHG

## 2019-10-10 LAB
ADD MANUAL DIFF: NO
ANION GAP SERPL CALC-SCNC: 12 MMOL/L (ref 5–15)
BASOPHILS NFR BLD AUTO: 1.2 % (ref 0–2)
BUN SERPL-MCNC: 25 MG/DL (ref 7–18)
CALCIUM SERPL-MCNC: 9 MG/DL (ref 8.5–10.1)
CHLORIDE SERPL-SCNC: 108 MMOL/L (ref 98–107)
CO2 SERPL-SCNC: 21 MMOL/L (ref 21–32)
CREAT SERPL-MCNC: 1.4 MG/DL (ref 0.55–1.3)
EOSINOPHIL NFR BLD AUTO: 5 % (ref 0–3)
ERYTHROCYTE [DISTWIDTH] IN BLOOD BY AUTOMATED COUNT: 13.7 % (ref 11.6–14.8)
HCT VFR BLD CALC: 40.2 % (ref 42–52)
HGB BLD-MCNC: 13.5 G/DL (ref 14.2–18)
LYMPHOCYTES NFR BLD AUTO: 37.8 % (ref 20–45)
MCV RBC AUTO: 88 FL (ref 80–99)
MONOCYTES NFR BLD AUTO: 10.9 % (ref 1–10)
NEUTROPHILS NFR BLD AUTO: 45.1 % (ref 45–75)
PLATELET # BLD: 174 K/UL (ref 150–450)
POTASSIUM SERPL-SCNC: 4.5 MMOL/L (ref 3.5–5.1)
RBC # BLD AUTO: 4.55 M/UL (ref 4.7–6.1)
SODIUM SERPL-SCNC: 141 MMOL/L (ref 136–145)
WBC # BLD AUTO: 6.2 K/UL (ref 4.8–10.8)

## 2019-10-10 RX ADMIN — OXYBUTYNIN CHLORIDE SCH MG: 5 TABLET ORAL at 14:32

## 2019-10-10 RX ADMIN — OXYBUTYNIN CHLORIDE SCH MG: 5 TABLET ORAL at 06:06

## 2019-10-10 RX ADMIN — OXYBUTYNIN CHLORIDE SCH MG: 5 TABLET ORAL at 22:20

## 2019-10-10 NOTE — NUR
Received patient in bed, awake, alert, with noted confusion, and wandering in the tay way. 
No acute distress noted, call light is within reach, bed is lowered, locked and alarm is on. 
Will continue to monitor for comfort and safety.

## 2019-10-10 NOTE — CARDIAC ELECTROPHYSIOLOGY PN
Subjective


Subjective


[id number]





Objective





Last 24 Hour Vital Signs








  Date Time  Temp Pulse Resp B/P (MAP) Pulse Ox O2 Delivery O2 Flow Rate FiO2


 


10/10/19 09:30    134/73    


 


10/10/19 09:00      Room Air  


 


10/10/19 08:00 97.9 56 19 134/73 (93) 96   


 


10/10/19 04:00 97.3 48 20 147/72 (97) 96   


 


10/10/19 00:00 97.5 52 18 130/73 (92) 97   


 


10/9/19 21:00      Room Air  


 


10/9/19 20:00 98.2 56 20 136/82 (100) 94   


 


10/9/19 16:00 98.2 57 18 133/74 (93) 98   


 


10/9/19 12:00 98.4 60 20 125/95 (105) 98   

















Intake and Output  


 


 10/9/19 10/10/19





 19:00 07:00


 


Intake Total 600 ml 


 


Balance 600 ml 


 


  


 


Intake Oral 600 ml 


 


# Voids 5 2


 


# Bowel Movements 2 











Laboratory Tests








Test


  10/10/19


05:15


 


White Blood Count


  6.2 K/UL


(4.8-10.8)


 


Red Blood Count


  4.55 M/UL


(4.70-6.10)  L


 


Hemoglobin


  13.5 G/DL


(14.2-18.0)  L


 


Hematocrit


  40.2 %


(42.0-52.0)  L


 


Mean Corpuscular Volume 88 FL (80-99)  


 


Mean Corpuscular Hemoglobin


  29.7 PG


(27.0-31.0)


 


Mean Corpuscular Hemoglobin


Concent 33.7 G/DL


(32.0-36.0)


 


Red Cell Distribution Width


  13.7 %


(11.6-14.8)


 


Platelet Count


  174 K/UL


(150-450)


 


Mean Platelet Volume


  6.6 FL


(6.5-10.1)


 


Neutrophils (%) (Auto)


  45.1 %


(45.0-75.0)


 


Lymphocytes (%) (Auto)


  37.8 %


(20.0-45.0)


 


Monocytes (%) (Auto)


  10.9 %


(1.0-10.0)  H


 


Eosinophils (%) (Auto)


  5.0 %


(0.0-3.0)  H


 


Basophils (%) (Auto)


  1.2 %


(0.0-2.0)


 


Sodium Level


  141 MMOL/L


(136-145)


 


Potassium Level


  4.5 MMOL/L


(3.5-5.1)


 


Chloride Level


  108 MMOL/L


()  H


 


Carbon Dioxide Level


  21 MMOL/L


(21-32)


 


Anion Gap


  12 mmol/L


(5-15)


 


Blood Urea Nitrogen


  25 mg/dL


(7-18)  H


 


Creatinine


  1.4 MG/DL


(0.55-1.30)  H


 


Estimat Glomerular Filtration


Rate 50.9 mL/min


(>60)


 


Glucose Level


  88 MG/DL


()


 


Calcium Level


  9.0 MG/DL


(8.5-10.1)

















Vaibhav Eubanks MD Oct 10, 2019 10:36

## 2019-10-10 NOTE — GENERAL PROGRESS NOTE
Assessment/Plan


Problem List:  


(1) Failure to thrive


SNOMED:  68995483


Qualifiers:  


   Qualified Codes:  R62.7 - Adult failure to thrive


(2) Gravely disabled


SNOMED:  491283600


(3) Acute encephalopathy


ICD Codes:  G93.40 - Encephalopathy, unspecified


SNOMED:  26542445, 830245534


(4) Encounter for generalized patient complaints


ICD Codes:  Z00.8 - Encounter for other general examination


SNOMED:  088727689


(5) Dementia with behavioral disturbance


ICD Codes:  F03.91 - Unspecified dementia with behavioral disturbance


SNOMED:  6800837585938


(6) Essential hypertension


ICD Codes:  I10 - Essential (primary) hypertension


SNOMED:  00972604


Status:  doing well, stable


Assessment/Plan:


64 year old male admitted from facility for acute encephalopathy, Failure to 

thrive and dementia with behavioral disturbance





1. Acute encephalopathy/ dementia with behavioral disturbance. Doubt metabolic, 

based on reviewing his labs. His renal function is at baseline and stable - 

RESOLVED


-psychiatry consult with Dr. Meyers 


-PT, patient seen ambulating independently with walker 


Case management for dc planning: Still pending placement





2. Obstructive uropathy with staghorn calculus.BPH.  Renal function stable. 

Continue Tamsulosin 


3. CKD stage II-III, stable. 


4. Failure to thrive. 


5. Dementia with behavioral disturbance.  Resume outpatient psych meds. 

Risperidone 2mg at night per psychiatry and ativan prn 


6.HTN, controlled


-Amlodipine 10mg, Atenolol stopped due to bradycardia. INCREASE lisinopril to 

10mg PO daily. 


7. Etoh dependence in the past. Thiamine and folate 


8. Neurogenic bladder: Oxybutynin 5mg TID 


9. Asymptomatic bradycardia


-EKG


-Appreicate cardiology evaluation: Dr. Eubanks. No intervention for now. 





has no insurance,  involved. applied for medical. Still pending





Time of this note may not reflect time of encounter. 


Discussed with RN and patient. I spent 28 minutes on this encounter. 50% or 

more on counselling and care coordination.





Subjective


Date patient seen:  Oct 10, 2019


Constitutional:  Denies: chills, diaphoresis, fever, malaise, weakness, other


HEENT:  Denies: eye pain, blurred vision, tearing, double vision, ear pain, ear 

discharge, nose pain, nose congestion, throat pain, throat swelling, mouth pain

, mouth swelling, other


Cardiovascular:  Denies: chest pain, edema, irregular heart rate, 

lightheadedness, palpitations, syncope, other


Respiratory:  Denies: cough, orthopnea, shortness of breath, SOB with excertion

, SOB at rest, sputum, stridor, wheezing, other


Gastrointestinal/Abdominal:  Denies: abdomen distended, abdominal pain, black 

stools, tarry stools, blood in stool, constipated, diarrhea, difficulty 

swallowing, nausea, poor appetite, poor fluid intake, rectal bleeding, vomiting

, other


Genitourinary:  Denies: burning, discharge, frequency, flank pain, hematuria, 

incontinence, pain, urgency, other


Neurologic/Psychiatric:  Denies: anxiety, depressed, emotional problems, 

headache, numbness, paresthesia, pre-existing deficit, seizure, tingling, 

tremors, weakness, other


Endocrine:  Denies: excessive sweating, flushing, intolerance to cold, 

intolerance to heat, increased hunger, increased thirst, increased urine, 

unexplained weight gain, unexplained weight loss, other


Hematologic/Lymphatic:  Denies: anemia, easy bleeding, easy bruising, other


Allergies:  


Coded Allergies:  


     No Known Allergies (Unverified , 7/23/19)


Subjective


No acute events overnight per nursing. No acute changes. Patient has no 

complaints. Continues to be bradycardic to 50s. No symptoms. Other vital signs 

stable. No complaints. Feels well. Still waiting for placement. Patient 

ambulates without difficulty, he denies any complaints


HTN: Blood pressure stable at -120s. Tolerating lisinopril. No side 

effects so far. Denies headaches, numbness, tingling. Denies chest pain or SOB.





Objective





Last 24 Hour Vital Signs








  Date Time  Temp Pulse Resp B/P (MAP) Pulse Ox O2 Delivery O2 Flow Rate FiO2


 


10/10/19 12:00 97.7 58 18 108/62 (77) 93   


 


10/10/19 09:30    134/73    


 


10/10/19 09:00      Room Air  


 


10/10/19 08:00 97.9 56 19 134/73 (93) 96   


 


10/10/19 04:00 97.3 48 20 147/72 (97) 96   


 


10/10/19 00:00 97.5 52 18 130/73 (92) 97   


 


10/9/19 21:00      Room Air  


 


10/9/19 20:00 98.2 56 20 136/82 (100) 94   


 


10/9/19 16:00 98.2 57 18 133/74 (93) 98   

















Intake and Output  


 


 10/9/19 10/10/19





 19:00 07:00


 


Intake Total 600 ml 


 


Balance 600 ml 


 


  


 


Intake Oral 600 ml 


 


# Voids 5 2


 


# Bowel Movements 2 








Laboratory Tests


10/10/19 05:15: 


White Blood Count 6.2, Red Blood Count 4.55L, Hemoglobin 13.5L, Hematocrit 40.2L

, Mean Corpuscular Volume 88, Mean Corpuscular Hemoglobin 29.7, Mean 

Corpuscular Hemoglobin Concent 33.7, Red Cell Distribution Width 13.7, Platelet 

Count 174, Mean Platelet Volume 6.6, Neutrophils (%) (Auto) 45.1, Lymphocytes (%

) (Auto) 37.8, Monocytes (%) (Auto) 10.9H, Eosinophils (%) (Auto) 5.0H, 

Basophils (%) (Auto) 1.2, Sodium Level 141, Potassium Level 4.5, Chloride Level 

108H, Carbon Dioxide Level 21, Anion Gap 12, Blood Urea Nitrogen 25H, 

Creatinine 1.4H, Estimat Glomerular Filtration Rate 50.9, Glucose Level 88, 

Calcium Level 9.0


Height (Feet):  5


Height (Inches):  10.00


Weight (Pounds):  192


General Appearance:  WD/WN, no apparent distress, alert


EENT:  PERRL/EOMI


Neck:  non-tender, normal alignment, supple


Cardiovascular:  normal peripheral pulses, normal rate, regular rhythm, no JVD


Respiratory/Chest:  chest wall non-tender, lungs clear, normal breath sounds


Abdomen:  normal bowel sounds, non tender, soft


Extremities:  normal range of motion, non-tender, normal inspection


Neurologic:  CNs II-XII grossly normal, no motor/sensory deficits, alert, 

oriented x 3


Skin:  normal pigmentation, warm/dry











Melvin De La Cruz D.O. Oct 10, 2019 15:24

## 2019-10-10 NOTE — CONSULTATION
DATE OF CONSULTATION:  10/10/2019

CARDIOLOGY CONSULTATION



CONSULTING PHYSICIAN:  Vaibhav Eubanks M.D.



REFERRING PHYSICIAN:  Lalita Garrett M.D.



REASON FOR CONSULTATION:  Bradycardia.



HISTORY OF PRESENT ILLNESS:  The patient is a 64-year-old gentleman who was

admitted on 08/28/2019 with failure to thrive and encephalopathy and

behavior disturbances.  The patient noted to be bradycardic and a

Cardiology consultation was obtained for further evaluation.  The patient

denies any chest pain or palpitation or shortness of breath or

lightheadedness or dizziness.  The patient denies any syncope or

presyncopal symptoms.  The patient was on atenolol initially that was

stopped for bradycardia.  At the time of my evaluation, the patient denies

any chest pain or shortness of breath.



REVIEW OF SYSTEMS:  Negative other than what was mentioned in the history

of present illness.



PAST MEDICAL HISTORY:  As mentioned above.



FAMILY HISTORY:  Noncontributory.



SOCIAL HISTORY:  He has history of alcohol use in the past.



PHYSICAL EXAMINATION:

VITAL SIGNS:  Show blood pressure of 134/73, pulse is 56, respirations 18,

and he is afebrile.

HEAD AND NECK:  Showed no JVD or carotid bruits.

LUNGS:  Clear.

CARDIOVASCULAR:  Bradycardic.  S1 and S2 with no gallop or murmur.

ABDOMEN:  Soft.

EXTREMITIES:  No pitting edema.



LABORATORY AND DIAGNOSTIC DATA:  His 12-lead EKG shows sinus bradycardia,

rate of 48.  His labs show white count 6.2, hemoglobin 13.5, hematocrit of

40, and platelet count of 174,000.  Sodium 141, potassium 4.5, BUN of 25,

creatinine 1.4.



ASSESSMENT AND PLAN:

1. Bradycardia.  The heart rate in between 40s and 50s.  The patient

however remains completely asymptomatic.  Denies any syncope or

presyncope.  He is off of any sinus or AV-tawana blocking agents.

2. Hypertension, currently stable on lisinopril 10 mg daily.

3. Status post encephalopathy, resolved.

4. Dementia with behavioral disturbances.



Thank you very much for allowing me to participate in the care of this

patient.  Please do not hesitate to contact me for any questions regarding

my evaluation.



Sincerely









  ______________________________________________

  Vaibhav Eubanks M.D.





DR:  GIGI

D:  10/10/2019 10:38

T:  10/10/2019 17:32

JOB#:  [id number]

CC:

## 2019-10-10 NOTE — NUR
NURSE NOTES:



PATIENT RECEIVED FROM THU,RN. PATIENT RECEIVED AWAKE AND ALERT AND RESTING IN BED. PATIENT 
HAS NO IV SITE, DR IS AWARE. PATIENT IS ABLE TO MAKE NEEDS KNOWN. BED IN THE LOW AND LOCKED 
POSITION AND CALL LIGHT WITHIN REACH. WILL CONTINUE TO MONITOR.

## 2019-10-11 VITALS — DIASTOLIC BLOOD PRESSURE: 79 MMHG | SYSTOLIC BLOOD PRESSURE: 131 MMHG

## 2019-10-11 VITALS — DIASTOLIC BLOOD PRESSURE: 82 MMHG | SYSTOLIC BLOOD PRESSURE: 130 MMHG

## 2019-10-11 VITALS — DIASTOLIC BLOOD PRESSURE: 70 MMHG | SYSTOLIC BLOOD PRESSURE: 110 MMHG

## 2019-10-11 VITALS — DIASTOLIC BLOOD PRESSURE: 63 MMHG | SYSTOLIC BLOOD PRESSURE: 131 MMHG

## 2019-10-11 VITALS — SYSTOLIC BLOOD PRESSURE: 141 MMHG | DIASTOLIC BLOOD PRESSURE: 67 MMHG

## 2019-10-11 VITALS — SYSTOLIC BLOOD PRESSURE: 127 MMHG | DIASTOLIC BLOOD PRESSURE: 72 MMHG

## 2019-10-11 RX ADMIN — OXYBUTYNIN CHLORIDE SCH MG: 5 TABLET ORAL at 05:52

## 2019-10-11 RX ADMIN — OXYBUTYNIN CHLORIDE SCH MG: 5 TABLET ORAL at 14:00

## 2019-10-11 RX ADMIN — TAMSULOSIN HYDROCHLORIDE SCH MG: 0.4 CAPSULE ORAL at 17:10

## 2019-10-11 RX ADMIN — OXYBUTYNIN CHLORIDE SCH MG: 5 TABLET ORAL at 21:14

## 2019-10-11 RX ADMIN — Medication SCH MG: at 10:03

## 2019-10-11 NOTE — NUR
***DISCHARGE PLAN:



FAX REQUEST TO [facility]: [facility]

T:[phone]

F:[phone]



PENDING REVIEW

 AVAILABLE WEEKENDS:

## 2019-10-11 NOTE — NUR
NURSE NOTES:



HANDOFF RECEIVED FROM SALONI CARO. PATIENT RECEIVED SITTING IN BED EATING BREAKFAST, NO VISIBLE 
SIGNS OF DISTRESS. NO IV SITE BUT DR AWARE. BED IN LOW AND LOCKED POSITION WITH CALL LIGHT 
WITHIN REACH, WILL CONTINUE TO MONITOR.

## 2019-10-11 NOTE — NUR
NURSE NOTES:RECEIVED PATIENT LYING IN BED, AWAKE, ALERT/ORIENTED TO PERSON/PLACE, DENIES 
PAIN, NOTED WITHOUT IV ACCESS, MD AWARE.  NO SIGNS AND SYMPTOMS OF ACUTE CARDIO RESPIRATORY 
DISTRESS/SHORTNESS OF BREATH, DENIES CHEST PAIN, NO EDEMA NOTED. NO REPORT OF GI DISCOMFORT, 
NO N/V/D. ASSISTED WITH COMFORT CARE, SIDE RAILS UP X2 FOR MOBILITY, FWW AT BEDSIDE, 
ENCOURAGED PATIENT TO UTILIZE CALL LIGHT FOR ASSISTANCE, VERBALIZED UNDERSTANDING.  WILL 
CONTINUE TO MONITOR FOR SAFETY/NEEDS. NAD.

## 2019-10-11 NOTE — CARDIAC ELECTROPHYSIOLOGY PN
Assessment/Plan


Assessment/Plan





1. Asymptomatic bradycardia.  The heart rate in between 40s and 50s.  The 

patient


 remains completely asymptomatic.  Denies any syncope or


presyncope.  He is off of any sinus or AV-tawana blocking agents.


2. Hypertension, currently stable on lisinopril 10 mg daily.


3. Status post encephalopathy, resolved.


4. Dementia with behavioral disturbances.





Subjective


Subjective


No events on P4 awaiting placement. Asymptomatic





Objective





Last 24 Hour Vital Signs








  Date Time  Temp Pulse Resp B/P (MAP) Pulse Ox O2 Delivery O2 Flow Rate FiO2


 


10/11/19 12:00 97.1 67 17 127/72 (90) 17   


 


10/11/19 09:00      Room Air  


 


10/11/19 08:00  69 19 131/79 (96) 98   


 


10/11/19 04:00 97.3 60 20 110/70 (83) 98   


 


10/11/19 00:00 97.4 58 18 131/63 (85) 98   


 


10/10/19 21:04      Room Air  


 


10/10/19 20:00 97.9 55 18 127/79 (95) 95   


 


10/10/19 16:00 97.5 63 18 111/53 (72) 94   

















Intake and Output  


 


 10/10/19 10/11/19





 19:00 07:00


 


Intake Total 1200 ml 


 


Balance 1200 ml 


 


  


 


Intake Oral 1200 ml 


 


# Voids 8 


 


# Bowel Movements 3 











Laboratory Tests








Test


  10/11/19


10:40


 


Thyroid Stimulating Hormone


(TSH) 1.825 uiU/mL


(0.358-3.740)


 


Free Thyroxine


  1.09 NG/DL


(0.76-1.46)








Objective





HEAD AND NECK:  Showed no JVD or carotid bruits.


LUNGS:  Clear.


CARDIOVASCULAR:  Danny  S1 and S2 with no gallop or murmur.


ABDOMEN:  Soft.


EXTREMITIES:  No pitting edema.











Vaibhav Eubanks MD Oct 11, 2019 14:43

## 2019-10-11 NOTE — GENERAL PROGRESS NOTE
Assessment/Plan


Problem List:  


(1) Failure to thrive


SNOMED:  97808694


Qualifiers:  


   Qualified Codes:  R62.7 - Adult failure to thrive


(2) Gravely disabled


SNOMED:  139019619


(3) Acute encephalopathy


ICD Codes:  G93.40 - Encephalopathy, unspecified


SNOMED:  01151073, 648114667


(4) Encounter for generalized patient complaints


ICD Codes:  Z00.8 - Encounter for other general examination


SNOMED:  078832763


(5) Dementia with behavioral disturbance


ICD Codes:  F03.91 - Unspecified dementia with behavioral disturbance


SNOMED:  0602913855741


(6) Essential hypertension


ICD Codes:  I10 - Essential (primary) hypertension


SNOMED:  18605895


Status:  doing well, stable


Assessment/Plan:


64 year old male admitted from facility for acute encephalopathy, Failure to 

thrive and dementia with behavioral disturbance





1. Acute encephalopathy/ dementia with behavioral disturbance. Doubt metabolic, 

based on reviewing his labs. His renal function is at baseline and stable - 

RESOLVED


-Appreciate psychiatry consult with Dr. Meyers - Continue risperidone and 

ativan PRN


-PT, patient seen ambulating independently with walker 


Case management for dc planning: Still pending placement





2. Obstructive uropathy with staghorn calculus.BPH.  Renal function stable. 

Continue Tamsulosin 


3. CKD stage II-III, stable. 


4. Failure to thrive. - IMPROVING


5. Dementia with behavioral disturbance.  Resume outpatient psych meds. 

Risperidone 2mg at night per psychiatry and ativan prn 


6.HTN, controlled


-Holding Amlodipine 10mg and Atenolol stopped due to bradycardia. Continue 

lisinopril 5mg PO daily. Titrate up if needed


7. Etoh dependence in the past. Thiamine and folate 


8. Neurogenic bladder: Oxybutynin 5mg TID 


9. Asymptomatic bradycardia


-EKG


-Appreicate cardiology evaluation: Dr. Eubanks. No intervention for now. 





has no insurance,  involved. applied for medical. Still pending





Time of this note may not reflect time of encounter. 


Discussed with RN and patient. I spent 27 minutes on this encounter. 50% or 

more on counselling and care coordination.





Subjective


Date patient seen:  Oct 11, 2019


Constitutional:  Denies: no symptoms, chills, diaphoresis, fever, malaise, 

weakness, other


HEENT:  Denies: no symptoms, eye pain, blurred vision, tearing, double vision, 

ear pain, ear discharge, nose pain, nose congestion, throat pain, throat 

swelling, mouth pain, mouth swelling, other


Cardiovascular:  Denies: no symptoms, chest pain, edema, irregular heart rate, 

lightheadedness, palpitations, syncope, other


Respiratory:  Denies: no symptoms, cough, orthopnea, shortness of breath, SOB 

with excertion, SOB at rest, sputum, stridor, wheezing, other


Gastrointestinal/Abdominal:  Denies: no symptoms, abdomen distended, abdominal 

pain, black stools, tarry stools, blood in stool, constipated, diarrhea, 

difficulty swallowing, nausea, poor appetite, poor fluid intake, rectal bleeding

, vomiting, other


Genitourinary:  Denies: no symptoms, burning, discharge, frequency, flank pain, 

hematuria, incontinence, pain, urgency, other


Neurologic/Psychiatric:  Denies: no symptoms, anxiety, depressed, emotional 

problems, headache, numbness, paresthesia, pre-existing deficit, seizure, 

tingling, tremors, weakness, other


Endocrine:  Denies: no symptoms, excessive sweating, flushing, intolerance to 

cold, intolerance to heat, increased hunger, increased thirst, increased urine, 

unexplained weight gain, unexplained weight loss, other


Allergies:  


Coded Allergies:  


     No Known Allergies (Unverified , 7/23/19)


Subjective


No acute events overnight per nursing. No acute changes.  No complaints today 

patient feels well 


HTN: Blood pressure stable at -120s. Tolerating lisinopril. No side 

effects so far. Denies headaches, numbness, tingling. Denies chest pain or SOB.





Objective





Last 24 Hour Vital Signs








  Date Time  Temp Pulse Resp B/P (MAP) Pulse Ox O2 Delivery O2 Flow Rate FiO2


 


10/11/19 12:00 97.1 67 17 127/72 (90) 17   


 


10/11/19 09:00      Room Air  


 


10/11/19 08:00  69 19 131/79 (96) 98   


 


10/11/19 04:00 97.3 60 20 110/70 (83) 98   


 


10/11/19 00:00 97.4 58 18 131/63 (85) 98   


 


10/10/19 21:04      Room Air  


 


10/10/19 20:00 97.9 55 18 127/79 (95) 95   


 


10/10/19 16:00 97.5 63 18 111/53 (72) 94   

















Intake and Output  


 


 10/10/19 10/11/19





 18:59 06:59


 


Intake Total 1200 ml 


 


Balance 1200 ml 


 


  


 


Intake Oral 1200 ml 


 


# Voids 8 


 


# Bowel Movements 3 








Laboratory Tests


10/11/19 10:40: 


Thyroid Stimulating Hormone (TSH) 1.825, Free Thyroxine 1.09


Height (Feet):  5


Height (Inches):  10.00


Weight (Pounds):  192


General Appearance:  WD/WN, no apparent distress, alert


EENT:  PERRL/EOMI, normal ENT inspection


Neck:  non-tender, normal alignment, supple


Cardiovascular:  normal peripheral pulses, normal rate, regular rhythm, no JVD


Respiratory/Chest:  chest wall non-tender, lungs clear, normal breath sounds


Abdomen:  normal bowel sounds, non tender, soft, no organomegaly, no mass


Extremities:  normal range of motion, non-tender, normal inspection


Neurologic:  CNs II-XII grossly normal, no motor/sensory deficits, alert, 

oriented x 3, responsive, normal mood/affect


Skin:  normal pigmentation, warm/dry











Melvin De La Cruz D.O. Oct 11, 2019 13:35

## 2019-10-12 VITALS — SYSTOLIC BLOOD PRESSURE: 132 MMHG | DIASTOLIC BLOOD PRESSURE: 80 MMHG

## 2019-10-12 VITALS — DIASTOLIC BLOOD PRESSURE: 76 MMHG | SYSTOLIC BLOOD PRESSURE: 144 MMHG

## 2019-10-12 VITALS — SYSTOLIC BLOOD PRESSURE: 124 MMHG | DIASTOLIC BLOOD PRESSURE: 67 MMHG

## 2019-10-12 VITALS — SYSTOLIC BLOOD PRESSURE: 112 MMHG | DIASTOLIC BLOOD PRESSURE: 68 MMHG

## 2019-10-12 VITALS — DIASTOLIC BLOOD PRESSURE: 76 MMHG | SYSTOLIC BLOOD PRESSURE: 129 MMHG

## 2019-10-12 VITALS — DIASTOLIC BLOOD PRESSURE: 72 MMHG | SYSTOLIC BLOOD PRESSURE: 127 MMHG

## 2019-10-12 RX ADMIN — LISINOPRIL SCH MG: 2.5 TABLET ORAL at 09:02

## 2019-10-12 RX ADMIN — OXYBUTYNIN CHLORIDE SCH MG: 5 TABLET ORAL at 06:15

## 2019-10-12 RX ADMIN — TAMSULOSIN HYDROCHLORIDE SCH MG: 0.4 CAPSULE ORAL at 09:02

## 2019-10-12 RX ADMIN — Medication SCH MG: at 09:02

## 2019-10-12 RX ADMIN — OXYBUTYNIN CHLORIDE SCH MG: 5 TABLET ORAL at 21:50

## 2019-10-12 RX ADMIN — OXYBUTYNIN CHLORIDE SCH MG: 5 TABLET ORAL at 14:47

## 2019-10-12 NOTE — NUR
NURSE NOTES: RECEIVED PATIENT LYING IN BED, AWAKE, ALERT/ORIENTED TO PERSON/PLACE, DENIES 
PAIN, NO SIGNS AND SYMPTOMS OF ACUTE CARDIO RESPIRATORY DISTRESS/SHORTNESS OF BREATH, NO 
EDEMA NOTED. FALL PRECAUTIONS OBSERVED AT ALL TIMES SECONDARY TO HISTORY/BLIND LEFT EYE. NO 
REPORT OF GI DISCOMFORT, NO N/V/D. SIDE RAILS UP X3/BED IN LOWEST POSITION FOR SAFETY. CALL 
LIGHT WITHIN REACH. FREQUENT ROUNDING FOR SAFETY/NEEDS. DISCHARGE PLAN ONGOING. NAD.

## 2019-10-12 NOTE — GENERAL PROGRESS NOTE
Assessment/Plan


Status:  doing well, stable


Assessment/Plan:


64 year old male admitted from facility for acute encephalopathy, Failure to 

thrive and dementia with behavioral disturbance, now stable awaiting placement. 





1. Acute encephalopathy/ dementia with behavioral disturbance. Doubt metabolic, 

based on reviewing his labs. His renal function is at baseline and stable - 

RESOLVED


-Appreciate psychiatry consult with Dr. Meyers - Continue risperidone and 

ativan PRN


-PT, patient seen ambulating independently with walker 


Case management for dc planning: Still pending placement





2. Obstructive uropathy with staghorn calculus.BPH.  Renal function stable. 

Continue Tamsulosin 


3. CKD stage II-III, stable. 


4. Failure to thrive. - IMPROVING


5. Dementia with behavioral disturbance.  Resume outpatient psych meds. 

Risperidone 2mg at night per psychiatry and ativan prn 


6.HTN, controlled


-Holding Amlodipine 10mg and Atenolol stopped due to bradycardia. Continue 

lisinopril 5mg PO daily. Titrate up if needed


7. Etoh dependence in the past. Thiamine and folate 


8. Neurogenic bladder: Oxybutynin 5mg TID 


9. Asymptomatic bradycardia


-EKG


-Appreciate cardiology evaluation: Dr. Eubanks. No intervention for now. 





has no insurance,  involved. applied for medical. Still pending





Time of this note may not reflect time of encounter. 


Discussed with RN and patient. I spent 27 minutes on this encounter. 50% or 

more on counselling and care coordination.





Subjective


Date patient seen:  Oct 12, 2019


Constitutional:  Reports: no symptoms


HEENT:  Reports: no symptoms


Cardiovascular:  Reports: no symptoms


Respiratory:  Reports: no symptoms


Gastrointestinal/Abdominal:  Reports: no symptoms


Genitourinary:  Reports: no symptoms


Neurologic/Psychiatric:  Reports: no symptoms


Endocrine:  Reports: no symptoms


Hematologic/Lymphatic:  Reports: no symptoms


Allergies:  


Coded Allergies:  


     No Known Allergies (Unverified , 7/23/19)


Subjective


patient seen and examined. No acute complaints, feeling well, Eating well, +BM 

daily. chronic pain stable unchanged.





Objective





Last 24 Hour Vital Signs








  Date Time  Temp Pulse Resp B/P (MAP) Pulse Ox O2 Delivery O2 Flow Rate FiO2


 


10/12/19 12:00 98.2 52 16 144/76 (98) 98   


 


10/12/19 09:02    129/76    


 


10/12/19 09:00      Room Air  


 


10/12/19 08:00 98.1 72 16 129/76 (93) 96   


 


10/12/19 04:00 97.6 70 18 112/68 (83) 97   


 


10/12/19 00:00 97.1 67 17 127/72 (90) 97   


 


10/11/19 21:00      Room Air  


 


10/11/19 20:00 97.9 50 20 141/67 (91) 97   


 


10/11/19 16:49 98.0 67 18 130/82 (98) 96   

















Intake and Output  


 


 10/11/19 10/12/19





 18:59 06:59


 


Intake Total  1320 ml


 


Balance  1320 ml


 


  


 


Intake Oral  420 ml


 


Other  900 ml


 


# Voids  3








Height (Feet):  5


Height (Inches):  10.00


Weight (Pounds):  192


General Appearance:  no apparent distress, alert


Neck:  supple


Cardiovascular:  normal peripheral pulses, normal rate, regular rhythm


Respiratory/Chest:  lungs clear, normal breath sounds, no respiratory distress


Abdomen:  non tender, soft


Extremities:  non-tender


Edema:  trace edema


Neurologic:  alert, responsive


Skin:  warm/dry











Elodia Pizarro M.D. Oct 12, 2019 12:46

## 2019-10-12 NOTE — NUR
NURSE NOTES:

Received patient on bed, awake. No IV acess MD aware. Bed in low and locked position,c all 
light in reach. No signs of respiratory distress or pain. Room board updated, will continue 
to monitor.

## 2019-10-12 NOTE — NUR
CASE MANAGEMENT:REVIEW



10/12/2019



SI:FTT. GRAVELY DISABLED

**LEAKING URINE ON FLOOR

T 98.2 HR 52 RR 16 B/P 144/76 SATS 98% ON RA 

NONE TODAY 



IS: DITROPAN PO Q8HRS

LEXAPRO PO QD

LISINOPRIL PO QD

TYLENOL PO Q4/PRN



**: MED/SURG STATUS 4 EAST



DCP: PLACEMENT

## 2019-10-13 VITALS — SYSTOLIC BLOOD PRESSURE: 120 MMHG | DIASTOLIC BLOOD PRESSURE: 67 MMHG

## 2019-10-13 VITALS — DIASTOLIC BLOOD PRESSURE: 81 MMHG | SYSTOLIC BLOOD PRESSURE: 121 MMHG

## 2019-10-13 VITALS — DIASTOLIC BLOOD PRESSURE: 77 MMHG | SYSTOLIC BLOOD PRESSURE: 118 MMHG

## 2019-10-13 VITALS — SYSTOLIC BLOOD PRESSURE: 129 MMHG | DIASTOLIC BLOOD PRESSURE: 72 MMHG

## 2019-10-13 VITALS — SYSTOLIC BLOOD PRESSURE: 112 MMHG | DIASTOLIC BLOOD PRESSURE: 63 MMHG

## 2019-10-13 VITALS — SYSTOLIC BLOOD PRESSURE: 124 MMHG | DIASTOLIC BLOOD PRESSURE: 79 MMHG

## 2019-10-13 RX ADMIN — LISINOPRIL SCH MG: 2.5 TABLET ORAL at 09:23

## 2019-10-13 RX ADMIN — TAMSULOSIN HYDROCHLORIDE SCH MG: 0.4 CAPSULE ORAL at 09:22

## 2019-10-13 RX ADMIN — OXYBUTYNIN CHLORIDE SCH MG: 5 TABLET ORAL at 14:25

## 2019-10-13 RX ADMIN — OXYBUTYNIN CHLORIDE SCH MG: 5 TABLET ORAL at 05:55

## 2019-10-13 RX ADMIN — OXYBUTYNIN CHLORIDE SCH MG: 5 TABLET ORAL at 22:34

## 2019-10-13 RX ADMIN — Medication SCH MG: at 09:23

## 2019-10-13 NOTE — NUR
NURSE NOTES: RECEIVED PATIENT LYING IN BED, AWAKE, ALERT/ORIENTED X2, VERBALLY RESPONSIVE, 
DENIES PAIN. NO SIGNS AND SYMPTOMS OF ACUTE CARDIO RESPIRATORY DISTRESS/SHORTNESS OF BREATH, 
DENIES CHEST PAIN, NO EDEMA NOTED. NO IV ACCESS, MD AWARE. ABDOMEN SOFT/NON DISTENDED/NON 
TENDER/BOWEL SOUNDS AUDIBLE, NO REPORT OF N/V/D. SIDE RAILS UP X3/BED IN LOWEST POSITION FOR 
SAFETY, ENCOURAGED PATIENT TO UTILIZE CALL LIGHT FOR ASSISTANCE, VERBALIZED UNDERSTANDING. 
DCP ONGOING. NAD.

## 2019-10-13 NOTE — NUR
NURSE NOTES:

Received patient on bed awake. No IV access, MD aware. Bed in low and locked position, call 
light in reach. No signs of respiratory distress or pain. Room board updated, will continue 
to monitor.

## 2019-10-13 NOTE — GENERAL PROGRESS NOTE
Assessment/Plan


Status:  doing well, stable


Assessment/Plan:


64 year old male admitted from facility for acute encephalopathy, Failure to 

thrive and dementia with behavioral disturbance, now stable awaiting placement. 





1. Acute encephalopathy/ dementia with behavioral disturbance. Doubt metabolic, 

based on reviewing his labs. His renal function is at baseline and stable - 

RESOLVED


-Appreciate psychiatry consult with Dr. Meyers - Continue risperidone and 

ativan PRN


-PT, patient seen ambulating independently with walker 


Case management for dc planning: Still pending placement





2. Obstructive uropathy with staghorn calculus.BPH.  Renal function stable. 

Continue Tamsulosin 


3. CKD stage II-III, stable. 


4. Failure to thrive. - IMPROVING


5. Dementia with behavioral disturbance.  Resume outpatient psych meds. 

Risperidone 2mg at night per psychiatry and ativan prn 


6.HTN, controlled


-Holding Amlodipine 10mg and Atenolol stopped due to bradycardia. Continue 

lisinopril 5mg PO daily. Titrate up if needed


7. Etoh dependence in the past. Thiamine and folate 


8. Neurogenic bladder: Oxybutynin 5mg TID 


9. Asymptomatic bradycardia


-EKG


-Appreciate cardiology evaluation: Dr. Eubanks. No intervention for now. 





has no insurance,  involved. applied for medical. Still pending





Time of this note may not reflect time of encounter. 


Discussed with RN and patient. I spent 27 minutes on this encounter. 50% or 

more on counselling and care coordination.





Subjective


Date patient seen:  Oct 13, 2019


Allergies:  


Coded Allergies:  


     No Known Allergies (Unverified , 7/23/19)


All Systems:  reviewed and negative except above


Subjective


patient seen and examined. No acute complaints, feeling well, tolerating diet, 

regular BMs.





Objective





Last 24 Hour Vital Signs








  Date Time  Temp Pulse Resp B/P (MAP) Pulse Ox O2 Delivery O2 Flow Rate FiO2


 


10/13/19 12:00 98.1 54 17 124/79 (94) 97   


 


10/13/19 09:23    129/72    


 


10/13/19 09:00      Room Air  


 


10/13/19 08:00 96.0 60 19 129/72 (91) 94   


 


10/13/19 04:00 97.6 55 18 112/63 (79) 96   


 


10/13/19 00:00 98.1 66 20 118/77 (91) 100   


 


10/12/19 21:00      Room Air  


 


10/12/19 20:00 98.1 53 18 124/67 (86) 96   


 


10/12/19 16:00 98.7 53 18 132/80 (97) 98   

















Intake and Output  


 


 10/12/19 10/13/19





 19:00 07:00


 


Intake Total  1280 ml


 


Output Total  400 ml


 


Balance  880 ml


 


  


 


Intake Oral  480 ml


 


Other  800 ml


 


Output Urine Total  400 ml


 


# Voids  3








Height (Feet):  5


Height (Inches):  10.00


Weight (Pounds):  192


General Appearance:  no apparent distress, alert


Neck:  supple


Cardiovascular:  normal peripheral pulses, normal rate, regular rhythm


Respiratory/Chest:  lungs clear, normal breath sounds, no respiratory distress


Abdomen:  normal bowel sounds, non tender, soft


Extremities:  non-tender


Neurologic:  alert, responsive


Skin:  warm/dry











Elodia Pizarro M.D. Oct 13, 2019 13:14

## 2019-10-14 VITALS — DIASTOLIC BLOOD PRESSURE: 65 MMHG | SYSTOLIC BLOOD PRESSURE: 120 MMHG

## 2019-10-14 VITALS — SYSTOLIC BLOOD PRESSURE: 127 MMHG | DIASTOLIC BLOOD PRESSURE: 71 MMHG

## 2019-10-14 VITALS — DIASTOLIC BLOOD PRESSURE: 94 MMHG | SYSTOLIC BLOOD PRESSURE: 157 MMHG

## 2019-10-14 VITALS — SYSTOLIC BLOOD PRESSURE: 131 MMHG | DIASTOLIC BLOOD PRESSURE: 76 MMHG

## 2019-10-14 VITALS — DIASTOLIC BLOOD PRESSURE: 63 MMHG | SYSTOLIC BLOOD PRESSURE: 115 MMHG

## 2019-10-14 VITALS — DIASTOLIC BLOOD PRESSURE: 74 MMHG | SYSTOLIC BLOOD PRESSURE: 118 MMHG

## 2019-10-14 RX ADMIN — OXYBUTYNIN CHLORIDE SCH MG: 5 TABLET ORAL at 21:26

## 2019-10-14 RX ADMIN — LISINOPRIL SCH MG: 2.5 TABLET ORAL at 08:53

## 2019-10-14 RX ADMIN — OXYBUTYNIN CHLORIDE SCH MG: 5 TABLET ORAL at 05:06

## 2019-10-14 RX ADMIN — TAMSULOSIN HYDROCHLORIDE SCH MG: 0.4 CAPSULE ORAL at 08:52

## 2019-10-14 RX ADMIN — OXYBUTYNIN CHLORIDE SCH MG: 5 TABLET ORAL at 13:57

## 2019-10-14 RX ADMIN — Medication SCH MG: at 08:53

## 2019-10-14 NOTE — NUR
**DISCHARGE PLANNING

[facility] WHO PREVIOUSLY STATED THEY WOULD ACCEPT PATIENT HAS NOW DECLINED BECAUSE THEY 

DO NOT HAVE ANY LONG TERM CARE BEDS

## 2019-10-14 NOTE — NUR
NURSE NOTES:

 Patient is awake and alert,respirations unlabored.Patient sitting up and eating 
breakfast.Call light within reach.

## 2019-10-14 NOTE — NUR
NURSE NOTES:

RECEIVED PT FROM SALONI JACOBSON. PT IS AWAKE, AAOX3, ON ROOM AIR , NO ACUTE DISTRESS NOTED. NO IV 
ACCESS, MD AWARE. FALL RISK PRECAUTION IMPLEMENTED. WALKER AT BEDSIDE. BED IS LOCKED AND 
LOW, BED ALARMS ACTIVE, SIDE RAILS UP X2 AND CALL LIGHT IS WITHIN REACH.

## 2019-10-14 NOTE — NUR
CASE MANAGEMENT:REVIEW



10/14/2019



SI:FTT. GRAVELY DISABLED

98.6  76  18  127/71  96% ON RA





IS: DITROPAN PO Q8HRS

LEXAPRO PO QD

RISPERDAL PO QHS

LISINOPRIL PO QD

TYLENOL PO Q4/PRN

**: MED/SURG STATUS 4 [facility]





DCP: PLACEMENT

REFERRED TO [facility]...THEY DECLINED TO ACCEPT BECAUSE PATIENT IS LONG TERM

## 2019-10-14 NOTE — NUR
*-* INSURANCE *-*



UPDATED CLINICALS AND REVIEWS HAVE BEEN FAXED TO:



[facility]:JENNYFER GRIER

P: [phone]

F: [phone]

## 2019-10-14 NOTE — NUR
DISCHARGE PLANNING: NOTE 



SNF PACKET FAXED TO 



[facility] 

C/O IVETT 

T: [phone]

F [phone]



[facility] 

T [phone] 

F [phone]



[facility] 

T [phone]

F [phone]



STEPH [facility]

T [phone]

F  



[facility] 

C/O CINDY 

T; [phone]

F 



[place] 

T  

F 



[place] 

T 

F 



[facility] 



F 



[place] 

T 

F  



[place] 

T 

F 



[facility] 

F: [phone]

## 2019-10-14 NOTE — GENERAL PROGRESS NOTE
Assessment/Plan


Problem List:  


(1) Failure to thrive


SNOMED:  08207524


Qualifiers:  


   Qualified Codes:  R62.7 - Adult failure to thrive


(2) Gravely disabled


SNOMED:  755911682


(3) Acute encephalopathy


ICD Codes:  G93.40 - Encephalopathy, unspecified


SNOMED:  12882866, 567405720


(4) Encounter for generalized patient complaints


ICD Codes:  Z00.8 - Encounter for other general examination


SNOMED:  994554215


(5) Dementia with behavioral disturbance


ICD Codes:  F03.91 - Unspecified dementia with behavioral disturbance


SNOMED:  4407733263261


(6) Essential hypertension


ICD Codes:  I10 - Essential (primary) hypertension


SNOMED:  13085835


Status:  doing well, stable


Assessment/Plan:


64 year old male admitted from facility for acute encephalopathy, Failure to 

thrive and dementia with behavioral disturbance





1. Acute encephalopathy/ dementia with behavioral disturbance. Doubt metabolic, 

based on reviewing his labs. His renal function is at baseline and stable - 

RESOLVED


-Appreciate psychiatry consult with Dr. Meyers - Continue risperidone and 

ativan PRN


-PT, patient seen ambulating independently with walker 


Case management for dc planning: Still pending placement





2. Obstructive uropathy with staghorn calculus.BPH.  Renal function stable. 

Continue Tamsulosin 


3. CKD stage II-III, stable. 


4. Failure to thrive. - IMPROVING


5. Dementia with behavioral disturbance.  Resume outpatient psych meds. 

Risperidone 2mg at night per psychiatry and ativan prn 


6.HTN, controlled


-Holding Amlodipine 10mg and Atenolol stopped due to bradycardia. Continue 

lisinopril 5mg PO daily. Titrate up if needed


7. Etoh dependence in the past. Thiamine and folate 


8. Neurogenic bladder: Oxybutynin 5mg TID 


9. Asymptomatic bradycardia


-EKG


-Appreicate cardiology evaluation: Dr. Eubanks. No intervention for now. 





has no insurance,  involved. applied for medical. Still pending





Time of this note may not reflect time of encounter. 


Discussed with RN and patient. I spent 21 minutes on this encounter. 50% or 

more on counselling and care coordination.





Subjective


Date patient seen:  Oct 14, 2019


Constitutional:  Denies: chills, diaphoresis, fever, malaise, weakness, other


HEENT:  Denies: eye pain, blurred vision, tearing, double vision, ear pain, ear 

discharge, nose pain, nose congestion, throat pain, throat swelling, mouth pain

, mouth swelling, other


Cardiovascular:  Denies: chest pain, edema, irregular heart rate, 

lightheadedness, palpitations, syncope, other


Respiratory:  Denies: cough, orthopnea, shortness of breath, SOB with excertion

, SOB at rest, sputum, stridor, wheezing, other


Gastrointestinal/Abdominal:  Denies: abdomen distended, abdominal pain, black 

stools, tarry stools, blood in stool, constipated, diarrhea, difficulty 

swallowing, nausea, poor appetite, poor fluid intake, rectal bleeding, vomiting

, other


Genitourinary:  Denies: burning, discharge, frequency, flank pain, hematuria, 

incontinence, pain, urgency, other


Neurologic/Psychiatric:  Denies: anxiety, depressed, emotional problems, 

headache, numbness, paresthesia, pre-existing deficit, seizure, tingling, 

tremors, weakness, other


Endocrine:  Denies: excessive sweating, flushing, intolerance to cold, 

intolerance to heat, increased hunger, increased thirst, increased urine, 

unexplained weight gain, unexplained weight loss, other


Hematologic/Lymphatic:  Denies: anemia, easy bleeding, easy bruising, other


Allergies:  


Coded Allergies:  


     No Known Allergies (Unverified , 7/23/19)


Subjective


No acute events overnight per nursing. No acute changes.  Patient has no 

complaints today feels well tolerated his medications.  No episodes of 

agitation. Denies chest pain or SOB.





Objective





Last 24 Hour Vital Signs








  Date Time  Temp Pulse Resp B/P (MAP) Pulse Ox O2 Delivery O2 Flow Rate FiO2


 


10/14/19 20:00 97.9 55 17 118/74 (89) 95   


 


10/14/19 16:00        


 


10/14/19 12:00 98.6 76 18 127/71 (89) 96   


 


10/14/19 10:18      Room Air  


 


10/14/19 08:53    157/94    


 


10/14/19 08:46 97.5 58 18 157/94 (115) 95   


 


10/14/19 08:00 97.5 74 18 131/76 (94) 95   


 


10/14/19 04:00 98.0 57 18 120/65 (83) 96   


 


10/14/19 00:00 97.6 65 18 115/63 (80) 96   

















Intake and Output  


 


 10/13/19 10/14/19





 18:59 06:59


 


Intake Total 800 ml 360 ml


 


Output Total  400 ml


 


Balance 800 ml -40 ml


 


  


 


Intake Oral  360 ml


 


Other 800 ml 


 


Output Urine Total  400 ml


 


# Voids  2


 


# Bowel Movements  1








Height (Feet):  5


Height (Inches):  10.00


Weight (Pounds):  192


General Appearance:  WD/WN, no apparent distress, alert


EENT:  PERRL/EOMI, normal ENT inspection


Neck:  non-tender, normal alignment, supple


Cardiovascular:  normal peripheral pulses, normal rate, regular rhythm, no JVD


Respiratory/Chest:  chest wall non-tender, lungs clear, normal breath sounds, 

no respiratory distress


Abdomen:  normal bowel sounds, non tender, soft, no organomegaly, no mass


Extremities:  other - No lower extremity edema bilaterally


Neurologic:  CNs II-XII grossly normal, no motor/sensory deficits, alert, 

oriented x 3


Skin:  normal pigmentation, warm/dry











Melvin De La Cruz D.O. Oct 14, 2019 21:52

## 2019-10-14 NOTE — CARDIAC ELECTROPHYSIOLOGY PN
Assessment/Plan


Assessment/Plan


1. Asymptomatic bradycardia.  The heart rate in between 40s and 50s.  The 

patient


 remains completely asymptomatic.  Denies any syncope or


presyncope.  He is off of any sinus or AV-tawana blocking agents.Pacer is not 

indicated


2. Hypertension, currently stable on lisinopril 10 mg daily.


3. Status post encephalopathy, resolved.


4. Dementia with behavioral disturbances.





Subjective


Subjective


No events on P4 awaiting placement. No CP or SOB or dizziness despite 

bradycardia





Objective





Last 24 Hour Vital Signs








  Date Time  Temp Pulse Resp B/P (MAP) Pulse Ox O2 Delivery O2 Flow Rate FiO2


 


10/14/19 12:00 98.6 76 18 127/71 (89) 96   


 


10/14/19 10:18      Room Air  


 


10/14/19 08:53    157/94    


 


10/14/19 08:46 97.5 58 18 157/94 (115) 95   


 


10/14/19 08:00 97.5 74 18 131/76 (94) 95   


 


10/14/19 04:00 98.0 57 18 120/65 (83) 96   


 


10/14/19 00:00 97.6 65 18 115/63 (80) 96   


 


10/13/19 21:00      Room Air  


 


10/13/19 20:00 98.4 60 18 120/67 (84) 96   


 


10/13/19 16:00 97.8 59 17 121/81 (94) 96   

















Intake and Output  


 


 10/13/19 10/14/19





 19:00 07:00


 


Intake Total 800 ml 360 ml


 


Output Total  400 ml


 


Balance 800 ml -40 ml


 


  


 


Intake Oral  360 ml


 


Other 800 ml 


 


Output Urine Total  400 ml


 


# Voids  2


 


# Bowel Movements  1








Objective





HEAD AND NECK:  Showed no JVD or carotid bruits.


LUNGS:  Clear.


CARDIOVASCULAR:  Danny  S1 and S2 with no gallop or murmur.


ABDOMEN:  Soft.


EXTREMITIES:  No pitting edema.











Vaibhav Eubanks MD Oct 14, 2019 13:47

## 2019-10-15 VITALS — DIASTOLIC BLOOD PRESSURE: 72 MMHG | SYSTOLIC BLOOD PRESSURE: 131 MMHG

## 2019-10-15 VITALS — SYSTOLIC BLOOD PRESSURE: 130 MMHG | DIASTOLIC BLOOD PRESSURE: 75 MMHG

## 2019-10-15 VITALS — SYSTOLIC BLOOD PRESSURE: 145 MMHG | DIASTOLIC BLOOD PRESSURE: 82 MMHG

## 2019-10-15 VITALS — SYSTOLIC BLOOD PRESSURE: 130 MMHG | DIASTOLIC BLOOD PRESSURE: 72 MMHG

## 2019-10-15 VITALS — DIASTOLIC BLOOD PRESSURE: 88 MMHG | SYSTOLIC BLOOD PRESSURE: 146 MMHG

## 2019-10-15 VITALS — DIASTOLIC BLOOD PRESSURE: 71 MMHG | SYSTOLIC BLOOD PRESSURE: 136 MMHG

## 2019-10-15 RX ADMIN — TAMSULOSIN HYDROCHLORIDE SCH MG: 0.4 CAPSULE ORAL at 08:24

## 2019-10-15 RX ADMIN — OXYBUTYNIN CHLORIDE SCH MG: 5 TABLET ORAL at 14:18

## 2019-10-15 RX ADMIN — OXYBUTYNIN CHLORIDE SCH MG: 5 TABLET ORAL at 21:12

## 2019-10-15 RX ADMIN — OXYBUTYNIN CHLORIDE SCH MG: 5 TABLET ORAL at 05:48

## 2019-10-15 RX ADMIN — LISINOPRIL SCH MG: 2.5 TABLET ORAL at 08:25

## 2019-10-15 RX ADMIN — Medication SCH MG: at 08:25

## 2019-10-15 NOTE — NUR
P.T Note:

P.T  was referred to P.T due significant declined in functional status. P.T evaluation 
completed and tx initiated. Please refer to P.T evaluation for current functional status. Pt 
is alert, orient to self/person , place but not to time. Pt denied c/o pain but reports 
generalized weakness affecting his balance and overall functional mobility independence as 
safety. Pt currently  requires  MIN A X 1 for bed mobilities, transfers and gait/ambulation 
activities using the FWW. Skilled P.T services  is warranted to improve his strength, 
balance and endurance to increase mobility independence and safety.  Recommend SNF for 
further rehab intervention at NH. Thank you for this referral.

## 2019-10-15 NOTE — GENERAL PROGRESS NOTE
Assessment/Plan


Problem List:  


(1) Failure to thrive


SNOMED:  43057490


Qualifiers:  


   Qualified Codes:  R62.7 - Adult failure to thrive


(2) Gravely disabled


SNOMED:  404300010


(3) Acute encephalopathy


ICD Codes:  G93.40 - Encephalopathy, unspecified


SNOMED:  92839195, 765993775


(4) Encounter for generalized patient complaints


ICD Codes:  Z00.8 - Encounter for other general examination


SNOMED:  050651362


(5) Dementia with behavioral disturbance


ICD Codes:  F03.91 - Unspecified dementia with behavioral disturbance


SNOMED:  4054790308901


(6) Essential hypertension


ICD Codes:  I10 - Essential (primary) hypertension


SNOMED:  12358176


Status:  doing well, stable


Assessment/Plan:


64 year old male admitted from facility for acute encephalopathy, Failure to 

thrive and dementia with behavioral disturbance





1. Acute encephalopathy/ dementia with behavioral disturbance. Doubt metabolic, 

based on reviewing his labs. His renal function is at baseline and stable - 

RESOLVED


-Appreciate psychiatry consult with Dr. Meyers - Continue risperidone and 

ativan PRN


-PT, patient seen ambulating independently with walker 


Case management for dc planning: Still pending placement





2. Obstructive uropathy with staghorn calculus.BPH.  Renal function stable. 

Continue Tamsulosin 


3. CKD stage II-III, stable. 


4. Failure to thrive. - IMPROVING


5. Dementia with behavioral disturbance.  Resume outpatient psych meds. 

Risperidone 2mg at night per psychiatry and ativan prn 


6.HTN, controlled


-Holding Amlodipine 10mg and Atenolol stopped due to bradycardia. Continue 

lisinopril 5mg PO daily. Titrate up if needed


7. Etoh dependence in the past. Thiamine and folate 


8. Neurogenic bladder: Oxybutynin 5mg TID 


9. Asymptomatic bradycardia


-EKG


-Appreicate cardiology evaluation: Dr. Eubanks. No intervention for now. 





has no insurance,  involved. applied for medical. Still pending





Time of this note may not reflect time of encounter. 





Discussed with RN and patient. I spent 22 minutes on this encounter. 50% or 

more on counselling and care coordination.





Subjective


Date patient seen:  Oct 15, 2019


Constitutional:  Denies: chills, diaphoresis, fever, malaise, weakness, other


HEENT:  Denies: eye pain, blurred vision, tearing, double vision, ear pain, ear 

discharge, nose pain, nose congestion, throat pain, throat swelling, mouth pain

, mouth swelling, other


Cardiovascular:  Denies: chest pain, edema, irregular heart rate, 

lightheadedness, palpitations, syncope, other


Respiratory:  Denies: cough, orthopnea, shortness of breath, SOB with excertion

, SOB at rest, sputum, stridor, wheezing, other


Gastrointestinal/Abdominal:  Denies: abdomen distended, abdominal pain, black 

stools, tarry stools, blood in stool, constipated, diarrhea, difficulty 

swallowing, nausea, poor appetite, poor fluid intake, rectal bleeding, vomiting

, other


Genitourinary:  Denies: burning, discharge, frequency, flank pain, hematuria, 

incontinence, pain, urgency, other


Neurologic/Psychiatric:  Denies: anxiety, depressed, emotional problems, 

headache, numbness, paresthesia, pre-existing deficit, seizure, tingling, 

tremors, weakness, other


Endocrine:  Denies: excessive sweating, flushing, intolerance to cold, 

intolerance to heat, increased hunger, increased thirst, increased urine, 

unexplained weight gain, unexplained weight loss, other


Hematologic/Lymphatic:  Denies: anemia, easy bleeding, easy bruising, other


Allergies:  


Coded Allergies:  


     No Known Allergies (Unverified , 7/23/19)


Subjective


No acute events overnight per nursing. No acute changes. No behavioral 

problems.  Patient has no complaints today feels well tolerated his 

medications.  No episodes of agitation. Denies chest pain or SOB.





Objective





Last 24 Hour Vital Signs








  Date Time  Temp Pulse Resp B/P (MAP) Pulse Ox O2 Delivery O2 Flow Rate FiO2


 


10/15/19 12:00 98.1 51  136/71 (92) 96   


 


10/15/19 09:00      Room Air  


 


10/15/19 08:25 97.3 63 18 130/72 (91) 98   


 


10/15/19 08:25    130/72    


 


10/15/19 04:00 97.2 51 17 146/88 (107) 96   


 


10/15/19 00:00 97.5 51 16 145/82 (103) 96   


 


10/14/19 21:00      Room Air  


 


10/14/19 20:00 97.9 55 17 118/74 (89) 95   


 


10/14/19 16:00        

















Intake and Output  


 


 10/14/19 10/15/19





 19:00 07:00


 


Intake Total 800 ml 


 


Balance 800 ml 


 


  


 


Other 800 ml 


 


# Voids  1








Height (Feet):  5


Height (Inches):  10.00


Weight (Pounds):  192


General Appearance:  WD/WN, no apparent distress, alert


EENT:  PERRL/EOMI, normal ENT inspection


Neck:  non-tender, normal alignment, supple


Cardiovascular:  normal peripheral pulses, normal rate, regular rhythm, no JVD


Respiratory/Chest:  chest wall non-tender, lungs clear, normal breath sounds


Abdomen:  normal bowel sounds, non tender, soft, no organomegaly, no mass


Extremities:  normal range of motion, non-tender, normal inspection


Edema:  other - no LE edema bilaterally


Neurologic:  CNs II-XII grossly normal, no motor/sensory deficits, alert, 

oriented x 3


Skin:  normal pigmentation, warm/dry











Melvin De La Cruz D.O. Oct 15, 2019 14:04

## 2019-10-15 NOTE — NUR
NURSE NOTES:

Received patient on bed, awake. No IV access MD aware.  No signs of respiratory distress or 
pain. Bed in the lowest position and locked. call light within reach.  will continue to 
monitor.

## 2019-10-15 NOTE — NUR
NURSE NOTES:

Patient is awake and alert,respirations unlabored,patient sitting up in bed and eating 
breakfast.Call light within reach.

## 2019-10-16 VITALS — DIASTOLIC BLOOD PRESSURE: 74 MMHG | SYSTOLIC BLOOD PRESSURE: 137 MMHG

## 2019-10-16 VITALS — SYSTOLIC BLOOD PRESSURE: 149 MMHG | DIASTOLIC BLOOD PRESSURE: 84 MMHG

## 2019-10-16 VITALS — SYSTOLIC BLOOD PRESSURE: 152 MMHG | DIASTOLIC BLOOD PRESSURE: 75 MMHG

## 2019-10-16 VITALS — SYSTOLIC BLOOD PRESSURE: 136 MMHG | DIASTOLIC BLOOD PRESSURE: 77 MMHG

## 2019-10-16 RX ADMIN — Medication SCH MG: at 09:02

## 2019-10-16 RX ADMIN — TAMSULOSIN HYDROCHLORIDE SCH MG: 0.4 CAPSULE ORAL at 09:02

## 2019-10-16 RX ADMIN — OXYBUTYNIN CHLORIDE SCH MG: 5 TABLET ORAL at 06:00

## 2019-10-16 RX ADMIN — OXYBUTYNIN CHLORIDE SCH MG: 5 TABLET ORAL at 13:06

## 2019-10-16 RX ADMIN — LISINOPRIL SCH MG: 2.5 TABLET ORAL at 09:02

## 2019-10-16 NOTE — NUR
NURSE NOTES:

Received pt in bed, AAOx2. RA. No c/o of pain/distress. No IV access and MD aware. Side 
rails x 2. Bed in the lowest and locked. Call light within reach. Will continue to monitor

## 2019-10-16 NOTE — NUR
**DISCHARGE PLANNED

DISCHARGE ORDER NOTED



PATIENT WILL DISCHARGE TO



[facility] 18[facility]

T: [phone] FOR NURSE TO NURSE REPORT

[facility] HAS BEEN ARRANGED FOR 1430

## 2019-10-16 NOTE — DISCHARGE SUMMARY
Discharge Summary


Hospital Course


Date of Admission


Sep 2, 2019 at 19:37


Date of Discharge


Oct 16, 2019 at 15:30


Admitting Diagnosis


FAILURE TO THRIVE; GRAVE DISABILITY


HPI


Diego Wilder is a 64 year old male who was admitted on Sep 2, 2019 at 19:37 

for Failure To Thrive/Grave Disability


Consultations


Psychiatry


Cardiology


Hospital Course


64 year old male admitted from facility for acute encephalopathy, Failure to 

thrive and dementia with behavioral disturbance. All resolved. No metabolic 

issues. 





1. Acute encephalopathy/ dementia with behavioral disturbance. Doubt metabolic, 

based on reviewing his labs. His renal function is at baseline and stable - 

RESOLVED


-Appreciate psychiatry consult with Dr. Meyers - Continue risperidone 2mg PO 

daily and ativan PRN


-PT, patient seen ambulating independently with walker 





2. Obstructive uropathy with staghorn calculus.BPH.  Renal function stable. 

Continue Tamsulosin 


3. CKD stage II-III, stable. 


4. Failure to thrive. - IMPROVING


5. Dementia with behavioral disturbance.  Resume outpatient psych meds. 

Risperidone 2mg at night per psychiatry and ativan prn 


6.HTN, controlled


-Holding Amlodipine 10mg and Atenolol stopped due to bradycardia. Continue 

lisinopril 5mg PO daily. Titrate up if needed


7. Etoh dependence in the past. Thiamine and folate 


8. Neurogenic bladder: Oxybutynin 5mg TID 


9. Asymptomatic bradycardia


-EKG


-Appreicate cardiology evaluation: Dr. Eubanks. No intervention for now. 





Physical Exam





Temperature 98.2 pulse 65 respirations 20 blood pressure 152/75 saturating 95% 

on room air





General: WDWN male in NAD, A&O x 4


HEENT: Normocephalic cephalic atraumatic, pupils equal round reactive to light 

and accommodation, nares patent and no symmetrical, no tonsillar exudates, 

mucous membranes moist


CV: Regular rate regular rhythm, no murmurs, rubs, or gallops


Pulm: Lungs clear to auscultation bilaterally.  No wheezes, rhonchi, or rales


GI: Soft, nontender, nondistended, bowel sounds present


Neuro: CN 2-12 intact bilaterally, no focal signs.


Ext: No lower extremity edema bilaterally


Skin: no rashes lesions or ulcers


Msk: Joints symmetrical in upper extremity and lower extremity bilaterally, no 

joint swelling.


Lymph: No lymphadenopathy in upper extremity and lower extremity





Time of this note may not reflect time of encounter. 





>30 minutes spent on this discharge. Discussed with RN, , and 

patient. 50% or more on counselling and care coordination.





Discharge


Discharge Disposition


Patient was discharged to


Discharge Diagnoses:  


(1) Dementia with behavioral disturbance


(2) Depression


(3) Anxiety


(4) History of Clostridioides difficile colitis


(5) Staghorn calculus


(6) Essential hypertension











Melvin De La Cruz D.O. Oct 16, 2019 17:53

## 2019-10-16 NOTE — NUR
NURSE NOTES:

Patient was discharged to [facility] via ambulance. Report given to SALONI Bright. Pt 
had no iv site. Stable vital sign. Skin intact.  All belongings were accounted for and given 
to patient. Discharge instructions were given.

## 2019-10-17 NOTE — NUR
*-* INSURANCE *-*



UPDATED CLINICALS AND REVIEWS HAVE BEEN FAXED TO:



[facility]

TRACKING [id number]

CARLOS:LEANN T: 

P:[phone]

F: [phone]



& 



[facility]

## 2023-07-12 NOTE — NUR
CASE MANAGEMENT:REVIEW



9/25/19

SI:FTT. GRAVELY DISABLED

98.1   60  14  118/78    97% ON RA



IS: NORVASC PO QD

LEXAPRO PO QD

RISPERDAL PO QHS

FLOMAX PO BID

ATIVAN PO Q6HRS PRN

IVF@75/HR

HEPARIN SQ Q12

**: MED/SURG STATUS 4 [facility]





PLAN:

SEEKING SNF PLACEMENT

CALLED [facility] ~ UNABLE TO GET THROUGH TO A 

CALLED [facility] ~ THEY SAID IT'S [facility] RESPONSIBILITY TO FIND OR AUTHORIZE SNF 
PLACEMENT

NEITHER MEDICAL GROUP OR HEALTH NET SEEMS TO BE WILLING TO HELP WITH DISCHARGING THIS 
PATIENT



[facility] IS WILLING TO ACCEPT BUT THEY NEED AUTHROIZATION FROM SOMEONECASE 
MANAGEMENT:REVIEW



9/24/19

SI:FTT. GRAVELY DISABLED

97.1   53  16   127/71  98% ON RA

H/H-13.4/40.9    GLUCOSE+114



IS: NORVASC PO QD

LEXAPRO PO QD

RISPERDAL PO QHS

FLOMAX PO BID

ATIVAN PO Q6HRS PRN

IVF@75/HR

HEPARIN SQ Q12

**: MED/SURG STATUS 4 [facility]





PLAN:

SEEKING SNF PLACEMENT

CALLED [facility] ~ UNABLE TO GET THROUGH TO A 

CALLED [facility] ~ THEY SAID IT'S MEDICAL GROUPS RESPONSIBILITY TO FIND OR AUTHORIZE SNF 
PLACEMENT

NEITHER MEDICAL GROUP OR HEALTH NET SEEM TO BE WILLING TO HELP WITH DISCHARGING THIS PATIENT



[facility] IS WILLING TO ACCEPT IF THEY CAN OBTAIN AUTHORIZATION FROM SOMEONE Number Of Hemigard Strips Per Side: 1

## 2024-01-30 NOTE — UROLOGY PROGRESS NOTE
Assessment/Plan


Assessment/Plan:


1. Staghorn renal calculus.


2. Bladder calculus.


3. Mild hydronephrosis, likely chronic.


4. Chronic kidney disease.


5. Acute kidney injury, improved.


6. Hematuria.


7. Pyuria.


8. Urinary retention.


9. Neurogenic bladder.


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





monitor clinically


pierre out and voiding


monitor renal fxn, stable


abx as ordered


cont flomax BID


pt can have tx of stones later electively by urologist in his plan





Subjective


Allergies:  


Coded Allergies:  


     No Known Allergies (Unverified , 7/23/19)


Subjective


all noted, voiding





Objective





Last 24 Hour Vital Signs








  Date Time  Temp Pulse Resp B/P (MAP) Pulse Ox O2 Delivery O2 Flow Rate FiO2


 


7/30/19 04:00 98.2 92 18 140/69 (92) 92   


 


7/30/19 00:00 96.4 44 17 139/75 (96) 93   


 


7/29/19 20:04      Room Air  


 


7/29/19 20:00 97.9 67 17 134/67 (89) 98   


 


7/29/19 16:00 97.5 58 18 131/69 (89) 98   


 


7/29/19 12:00 97.3 62 18 143/83 (103) 98   


 


7/29/19 10:00      Room Air  


 


7/29/19 09:03  66  140/77    


 


7/29/19 09:02  66  140/77    

















Intake and Output  


 


 7/29/19 7/30/19





 19:00 07:00


 


Intake Total 795 ml 1425 ml


 


Balance 795 ml 1425 ml


 


  


 


Intake Oral 120 ml 480 ml


 


IV Total 675 ml 945 ml


 


# Voids 3 6


 


# Bowel Movements 1 











Microbiology








 Date/Time


Source Procedure


Growth Status


 


 


 7/23/19 14:50


Nasal Nares MRSA Culture - Final


Staphylococcus Aureus - Mrsa Complete


 


 7/25/19 04:45


Stool Clostridium difficile Toxin Assay - Final Complete


 


 7/23/19 14:16


Urine,Clean Catch Urine Culture - Final


NO GROWTH AFTER 48 HOURS Complete


 


 7/23/19 14:50


Rectum VRE Culture - Final


NO VANCOMYCIN RESISTANT ENTEROCOCCUS ... Complete








Current Medications








 Medications


  (Trade)  Dose


 Ordered  Sig/Lul


 Route


 PRN Reason  Start Time


 Stop Time Status Last Admin


Dose Admin


 


 Acetaminophen/


 Hydrocodone Bitart


  (Norco 5/325)  1 tab  Q4H  PRN


 ORAL


 Severe Pain (Pain Scale 7-10)  7/27/19 02:30


 7/30/19 18:29   


 


 


 Amlodipine


 Besylate


  (Norvasc)  10 mg  DAILY


 ORAL


   7/27/19 09:00


 8/26/19 08:59  7/29/19 09:02


 


 


 Aspirin


  (Ecotrin)  81 mg  DAILY


 ORAL


   7/27/19 09:00


 8/23/19 08:59  7/29/19 09:02


 


 


 Atenolol


  (Tenormin)  12.5 mg  DAILY


 ORAL


   7/29/19 09:00


 8/28/19 08:59  7/29/19 09:03


 


 


 Folic Acid


  (Folate)  1 mg  DAILY


 ORAL


   7/27/19 09:00


 8/23/19 08:59  7/29/19 09:02


 


 


 Multivitamins


  (Multivitamins)  1 tab  DAILY


 ORAL


   7/27/19 09:00


 8/23/19 08:59  7/29/19 09:03


 


 


 Quetiapine


 Fumarate


  (SEROquel)  37.5 mg  TID


 ORAL


   7/29/19 09:00


 8/28/19 08:59  7/29/19 18:07


 


 


 Sodium Chloride  1,000 ml @ 


 75 mls/hr  [id number]


 IV


   7/27/19 03:00


 8/24/19 02:59  7/29/19 20:44


 


 


 Tamsulosin HCl


  (Flomax)  0.4 mg  BID


 ORAL


   7/27/19 09:00


 8/22/19 19:00  7/29/19 18:07


 


 


 Tramadol HCl


  (Ultram)  50 mg  Q6H  PRN


 ORAL


 Moderate Pain (Pain Scale 4-6)  7/27/19 06:30


 7/30/19 18:29   


 


 


 Vancomycin HCl


  (Firvanq)  125 mg  FOUR TIMES A  DAY


 ORAL


   7/27/19 09:00


 8/2/19 12:59  7/29/19 20:25


 








Height (Feet):  5


Height (Inches):  10.00


Weight (Pounds):  181


Objective


 exam stable











Israel Pink MD Jul 30, 2019 08:27 Additional Notes: Clear from PNF by 6mm Render Risk Assessment In Note?: no Detail Level: Detailed

## 2025-04-29 NOTE — NUR
CASE MANAGEMENT:REVIEW



10/13/2019



SI:FTT. GRAVELY DISABLED

**LEAKING URINE ON FLOOR

T 97.6 HR 55 RR 18 B/P 112/63 SATS 96% ON RA 

NONE TODAY 



IS: DITROPAN PO Q8HRS

LEXAPRO PO QD

LISINOPRIL PO QD

TYLENOL PO Q4/PRN



**: MED/SURG STATUS 4 EAST



DCP: PLACEMENT English